# Patient Record
Sex: MALE | Race: WHITE | NOT HISPANIC OR LATINO | Employment: OTHER | URBAN - METROPOLITAN AREA
[De-identification: names, ages, dates, MRNs, and addresses within clinical notes are randomized per-mention and may not be internally consistent; named-entity substitution may affect disease eponyms.]

---

## 2017-02-02 ENCOUNTER — RADIANT APPOINTMENT (OUTPATIENT)
Dept: GENERAL RADIOLOGY | Facility: CLINIC | Age: 62
End: 2017-02-02
Attending: FAMILY MEDICINE
Payer: COMMERCIAL

## 2017-02-02 ENCOUNTER — OFFICE VISIT (OUTPATIENT)
Dept: FAMILY MEDICINE | Facility: CLINIC | Age: 62
End: 2017-02-02
Payer: COMMERCIAL

## 2017-02-02 VITALS
WEIGHT: 230.9 LBS | DIASTOLIC BLOOD PRESSURE: 72 MMHG | BODY MASS INDEX: 32.22 KG/M2 | HEART RATE: 70 BPM | SYSTOLIC BLOOD PRESSURE: 138 MMHG | TEMPERATURE: 97.4 F

## 2017-02-02 DIAGNOSIS — M79.5 FOREIGN BODY (FB) IN SOFT TISSUE: ICD-10-CM

## 2017-02-02 DIAGNOSIS — M79.5 FOREIGN BODY (FB) IN SOFT TISSUE: Primary | ICD-10-CM

## 2017-02-02 PROCEDURE — 99213 OFFICE O/P EST LOW 20 MIN: CPT | Performed by: FAMILY MEDICINE

## 2017-02-02 PROCEDURE — 73140 X-RAY EXAM OF FINGER(S): CPT | Mod: LT

## 2017-02-02 NOTE — MR AVS SNAPSHOT
After Visit Summary   2/2/2017    Doron Feldman    MRN: [id number]           Patient Information     Date Of Birth          1955        Visit Information        Provider Department      2/2/2017 2:00 PM Post, EFREN Oconnor MD [facility]        Today's Diagnoses     Foreign body (FB) in soft tissue    -  1        Follow-ups after your visit        Additional Services     ORTHO  REFERRAL       [facility] is referring you to the Orthopedic  Services at [facility].       The  Representative will assist you in the coordination of your Orthopedic and Musculoskeletal Care as prescribed by your physician.    The  Representative will call you within 1 business day to help schedule your appointment, or you may contact the  Representative at:    All areas ~ [phone]     Type of Referral : Surgical / Specialist       Timeframe requested: I spoke with Dr Mcdonald by phone and she will work him in at her [facility] office Fri, Feb 3    Coverage of these services is subject to the terms and limitations of your health insurance plan.  Please call member services at your health plan with any benefit or coverage questions.      If X-rays, CT or MRI's have been performed, please contact the facility where they were done to arrange for , prior to your scheduled appointment.  Please bring this referral request to your appointment and present it to your specialist.                  Who to contact     If you have questions or need follow up information about today's clinic visit or your schedule please contact [facility] directly at [phone].  Normal or non-critical lab and imaging results will be communicated to you by MyChart, letter or phone within 4 business days after the clinic has received the results. If you do not hear from us within 7 days, please contact the clinic through [email or web address]  "or phone. If you have a critical or abnormal lab result, we will notify you by phone as soon as possible.  Submit refill requests through [employer] or call your pharmacy and they will forward the refill request to us. Please allow 3 business days for your refill to be completed.          Additional Information About Your Visit        [employer] Information     [employer] lets you send messages to your doctor, view your test results, renew your prescriptions, schedule appointments and more. To sign up, go to [email or web address] . Click on \"Log in\" on the left side of the screen, which will take you to the Welcome page. Then click on \"Sign up Now\" on the right side of the page.     You will be asked to enter the access code listed below, as well as some personal information. Please follow the directions to create your username and password.     Your access code is: [email or web address]  Expires: 5/3/2017  3:15 PM     Your access code will  in 90 days. If you need help or a new code, please call your [facility] clinic or [phone].        Care EveryWhere ID     This is your Care EveryWhere ID. This could be used by other organizations to access your [facility] medical records  [id number]        Your Vitals Were     Pulse Temperature                70 97.4  F (36.3  C) (Tympanic)           Blood Pressure from Last 3 Encounters:   17 138/72   05/10/16 132/80   16 138/94    Weight from Last 3 Encounters:   17 230 lb 14.4 oz (104.736 kg)   05/10/16 228 lb (103.42 kg)   08/28/15 236 lb 12.8 oz (107.412 kg)              We Performed the Following     ORTHO  REFERRAL          Today's Medication Changes          These changes are accurate as of: 17  3:15 PM.  If you have any questions, ask your nurse or doctor.               Start taking these medicines.        Dose/Directions    amoxicillin-clavulanate 875-125 MG per tablet   Commonly known as:  AUGMENTIN   Used for:  Foreign body (FB) in soft tissue "   Started by:  EFREN Godoy MD        Dose:  1 tablet   Take 1 tablet by mouth 2 times daily   Quantity:  20 tablet   Refills:  0            Where to get your medicines      These medications [place] [facility] [place] - [address] [facility]  [address] [facility] [address]     Phone:  [phone]    - amoxicillin-clavulanate 875-125 MG per tablet             Primary Care Provider Office Phone # Fax #    Fahad Fisher [phone]       [facility] [address] [facility] [address]        Thank you!     Thank you for choosing [facility]  for your care. Our goal is always to provide you with excellent care. Hearing back from our patients is one way we can continue to improve our services. Please take a few minutes to complete the written survey that you may receive in the mail after your visit with us. Thank you!             Your Updated Medication List - Protect others around you: Learn how to safely use, store and throw away your medicines at www.disposemymeds.org.          This list is accurate as of: 2/2/17  3:15 PM.  Always use your most recent med list.                   Brand Name Dispense Instructions for use    amoxicillin-clavulanate 875-125 MG per tablet    AUGMENTIN    20 tablet    Take 1 tablet by mouth 2 times daily       aspirin 81 MG tablet      Take 81 mg by mouth daily       carvedilol 25 MG tablet    COREG    180 tablet    Take 1 tablet (25 mg) by mouth 2 times daily (with meals)       finasteride 5 MG tablet    PROSCAR    90 tablet    Take 1 tablet (5 mg) by mouth daily       lisinopril 20 MG tablet    PRINIVIL/ZESTRIL    180 tablet    Take 1 tablet (20 mg) by mouth 2 times daily       OXYCODONE HCL PO          pravastatin 40 MG tablet    PRAVACHOL    90 tablet    Take 1 tablet (40 mg) by mouth daily       sildenafil 100 MG cap/tab    VIAGRA    6 tablet    Take 1 tablet (100 mg) by  mouth daily as needed for erectile dysfunction

## 2017-02-02 NOTE — NURSING NOTE
"Chief Complaint   Patient presents with     Infection     was hammering yesterday and something flee out and hit him the the thumb and now it is red and swollen, left hand       Initial /88 mmHg  Pulse 70  Temp(Src) 97.4  F (36.3  C) (Tympanic)  Wt 230 lb 14.4 oz (104.736 kg) Estimated body mass index is 32.22 kg/(m^2) as calculated from the following:    Height as of 5/10/16: 5' 11\" (1.803 m).    Weight as of this encounter: 230 lb 14.4 oz (104.736 kg).  BP completed using cuff size: large  Carmella Card CMA      "

## 2017-02-02 NOTE — PROGRESS NOTES
SUBJECTIVE:                                                    Doron Feldman is a 61 year old male who presents to clinic today for the following health issues:      Chief Complaint   Patient presents with     Infection     was hammering yesterday and something flew out and hit him the the thumb and now it is red and swollen, left hand   He was striking a hammer in his left hand with another hammer in his right hand and a piece of metal flew into the base of his left thumb. It was bleeding and he tried to squeeze the blood out, but was not sure if there was still a foreign body present. Now today it is more tender and somewhat red so he was concerned about a retained foreign body          Problem list and histories reviewed & adjusted, as indicated.  Additional history: none          OBJECTIVE:                                                    /72 mmHg  Pulse 70  Temp(Src) 97.4  F (36.3  C) (Tympanic)  Wt 230 lb 14.4 oz (104.736 kg)    GENERAL: healthy, alert and no distress  EYES: Eyes grossly normal to inspection, extraocular movements - intact, and PERRL  MS: In the region of the proximal left thumb is a small puncture wound and surrounding this is erythema; there is tenderness to palpation over the proximal phalanx of the thumb on the dorsal radial aspect    X-ray of the thumb shows a metallic foreign body in the region of the tenderness to palpation      ASSESSMENT/PLAN:                                                    ASSESSMENT:  1. Foreign body (FB) in soft tissue        PLAN:  Orders Placed This Encounter     XR Finger Left G/E 2 Views     He was referred to Dr. Mcdonald, hand surgeon with [facility]. I spoke with her on the phone and she recommended infusing some saline around the wound, then placing him in a thumb spica splint and starting him on Augmentin. This was done; she will see him at her [facility] clinic tomorrow.    EFREN Oconnor Post  [facility]

## 2017-02-03 ENCOUNTER — OFFICE VISIT (OUTPATIENT)
Dept: FAMILY MEDICINE | Facility: CLINIC | Age: 62
End: 2017-02-03
Payer: COMMERCIAL

## 2017-02-03 ENCOUNTER — TRANSFERRED RECORDS (OUTPATIENT)
Dept: HEALTH INFORMATION MANAGEMENT | Facility: CLINIC | Age: 62
End: 2017-02-03

## 2017-02-03 VITALS
WEIGHT: 230 LBS | SYSTOLIC BLOOD PRESSURE: 106 MMHG | BODY MASS INDEX: 32.2 KG/M2 | DIASTOLIC BLOOD PRESSURE: 70 MMHG | HEIGHT: 71 IN | HEART RATE: 69 BPM | TEMPERATURE: 98.2 F

## 2017-02-03 DIAGNOSIS — Z23 ENCOUNTER FOR IMMUNIZATION: ICD-10-CM

## 2017-02-03 DIAGNOSIS — Z01.818 PREOP GENERAL PHYSICAL EXAM: Primary | ICD-10-CM

## 2017-02-03 LAB
BASOPHILS # BLD AUTO: 0.1 10E9/L (ref 0–0.2)
BASOPHILS NFR BLD AUTO: 0.8 %
CREAT SERPL-MCNC: 1.22 MG/DL (ref 0.66–1.25)
DIFFERENTIAL METHOD BLD: NORMAL
EOSINOPHIL # BLD AUTO: 0.2 10E9/L (ref 0–0.7)
EOSINOPHIL NFR BLD AUTO: 3.6 %
ERYTHROCYTE [DISTWIDTH] IN BLOOD BY AUTOMATED COUNT: 12.3 % (ref 10–15)
GFR SERPL CREATININE-BSD FRML MDRD: 60 ML/MIN/1.7M2
HCT VFR BLD AUTO: 45.5 % (ref 40–53)
HGB BLD-MCNC: 15.2 G/DL (ref 13.3–17.7)
LYMPHOCYTES # BLD AUTO: 2.1 10E9/L (ref 0.8–5.3)
LYMPHOCYTES NFR BLD AUTO: 32.2 %
MCH RBC QN AUTO: 31.7 PG (ref 26.5–33)
MCHC RBC AUTO-ENTMCNC: 33.4 G/DL (ref 31.5–36.5)
MCV RBC AUTO: 95 FL (ref 78–100)
MONOCYTES # BLD AUTO: 0.8 10E9/L (ref 0–1.3)
MONOCYTES NFR BLD AUTO: 12.7 %
NEUTROPHILS # BLD AUTO: 3.2 10E9/L (ref 1.6–8.3)
NEUTROPHILS NFR BLD AUTO: 50.7 %
PLATELET # BLD AUTO: 195 10E9/L (ref 150–450)
POTASSIUM SERPL-SCNC: 4.3 MMOL/L (ref 3.4–5.3)
RBC # BLD AUTO: 4.79 10E12/L (ref 4.4–5.9)
WBC # BLD AUTO: 6.4 10E9/L (ref 4–11)

## 2017-02-03 PROCEDURE — 36415 COLL VENOUS BLD VENIPUNCTURE: CPT | Performed by: FAMILY MEDICINE

## 2017-02-03 PROCEDURE — 85025 COMPLETE CBC W/AUTO DIFF WBC: CPT | Performed by: FAMILY MEDICINE

## 2017-02-03 PROCEDURE — 82565 ASSAY OF CREATININE: CPT | Performed by: FAMILY MEDICINE

## 2017-02-03 PROCEDURE — 84132 ASSAY OF SERUM POTASSIUM: CPT | Performed by: FAMILY MEDICINE

## 2017-02-03 PROCEDURE — 99214 OFFICE O/P EST MOD 30 MIN: CPT | Performed by: FAMILY MEDICINE

## 2017-02-03 PROCEDURE — 90471 IMMUNIZATION ADMIN: CPT | Performed by: FAMILY MEDICINE

## 2017-02-03 PROCEDURE — 90715 TDAP VACCINE 7 YRS/> IM: CPT | Performed by: FAMILY MEDICINE

## 2017-02-03 NOTE — MR AVS SNAPSHOT
After Visit Summary   2/3/2017    Doron Feldman    MRN: [id number]           Patient Information     Date Of Birth          1955        Visit Information        Provider Department      2/3/2017 1:40 PM Quentin Tanner MD [facility]        Today's Diagnoses     Preop general physical exam    -  1       Care Instructions      Before Your Surgery      Call your surgeon if there is any change in your health. This includes signs of a cold or flu (such as a sore throat, runny nose, cough, rash or fever).    Do not smoke, drink alcohol or take over the counter medicine (unless your surgeon or primary care doctor tells you to) for the 24 hours before and after surgery.    If you take prescribed drugs: Follow your doctor s orders about which medicines to take and which to stop until after surgery.    Eating and drinking prior to surgery: follow the instructions from your surgeon    Take a shower or bath the night before surgery. Use the soap your surgeon gave you to gently clean your skin. If you do not have soap from your surgeon, use your regular soap. Do not shave or scrub the surgery site.  Wear clean pajamas and have clean sheets on your bed.             Thank you for choosing [facility].  You may be receiving a survey in the mail from "[employer] [facility] regarding your visit today.  Please take a few minutes to complete and return the survey to let us know how we are doing.      If you have questions or concerns, please contact us via [employer] or you can contact your care team at [phone].    Our Clinic hours are:  Monday 6:40 am  to 7:00 pm  Tuesday -Friday 6:40 am to 5:00 pm    The [place] outpatient lab hours are:  Monday - Friday 6:10 am to 4:45 pm  Saturdays 7:00 am to 11:00 am  Appointments are required, call [phone]    If you have clinical questions after hours or would like to schedule an appointment,  call the clinic at [phone].    DIAGNOSTICS:                                                       EKG: Not indicated due to non-vascular surgery and low risk of event (age <65 and without cardiac risk factors)  Labs Drawn and in Process:   Unresulted Labs Ordered in the Past 30 Days of this Admission     No orders found from 2016 to 2017.          Recent Labs   Lab Test  09/01/15   1553  03/27/15   0817 14   1537   14   0719   11   1730   HGB   --    --    --    --    --    --   15.7   --   14.3   PLT   --    --    --    --    --    --   196   --   262   INR   --   2.02*  2.3*   < >   --    < >  1.82*   < >  1.40*   NA  139   --    --    --   144   --   143   --   139   POTASSIUM  4.4   --    --    --   4.1   --   4.0   --   4.0   CR  0.89   --    --    --   1.08   --   0.76   --   0.75    < > = values in this interval not displayed.        IMPRESSION:                                                    Reason for surgery/procedure: Doron Feldman (: 1955) presents for pre-operative evaluation assessment as requested by Dr. Mcdonald. He requires evaluation and anesthesia risk assessment prior to undergoing surgery/procedure for treatment of a metal foreign body in soft tissue.  Proposed procedure: Left thumb foreign body removal.    The proposed surgical procedure is considered LOW risk.    REVISED CARDIAC RISK INDEX  The patient has the following serious cardiovascular risks for perioperative complications such as (MI, PE, VFib and 3  AV Block):  No serious cardiac risks  INTERPRETATION: 0 risks: Class I (very low risk - 0.4% complication rate)    The patient has the following additional risks for perioperative complications:  No identified additional risks      ICD-10-CM    1. Preop general physical exam Z01.818        RECOMMENDATIONS:                                                      --Patient is to take all scheduled medications on the day before surgery EXCEPT for modifications listed below.  Stop aspirin now. Take no  "advil or aleve before surgery. Tylenol is Ok.   Your stomach should be empty for 8 hours before surgery.         Follow-ups after your visit        Your next 10 appointments already scheduled     2017   Procedure with Adilene Mcdonald MD   [facility] (--)    [address] [facility] [address]   [phone]           The medical center is located at [address]. (between [address] and [address] in [place], [place] north of [place]).              Who to contact     If you have questions or need follow up information about today's clinic visit or your schedule please contact [facility] directly at [phone].  Normal or non-critical lab and imaging results will be communicated to you by [employer], letter or phone within 4 business days after the clinic has received the results. If you do not hear from us within 7 days, please contact the clinic through [employer] or phone. If you have a critical or abnormal lab result, we will notify you by phone as soon as possible.  Submit refill requests through [employer] or call your pharmacy and they will forward the refill request to us. Please allow 3 business days for your refill to be completed.          Additional Information About Your Visit        [employer] Information     [employer] lets you send messages to your doctor, view your test results, renew your prescriptions, schedule appointments and more. To sign up, go to [email or web address] . Click on \"Log in\" on the left side of the screen, which will take you to the Welcome page. Then click on \"Sign up Now\" on the right side of the page.     You will be asked to enter the access code listed below, as well as some personal information. Please follow the directions to create your username and password.     Your access code is: [email or web address]  Expires: 5/3/2017  3:15 PM     Your access code will  in 90 days. If you need help or a new code, please call your [facility] " "clinic or [phone].        Care EveryWhere ID     This is your Care EveryWhere ID. This could be used by other organizations to access your [place] medical records  [id number]        Your Vitals Were     Pulse Temperature Height BMI (Body Mass Index)          69 98.2  F (36.8  C) (Tympanic) 5' 11\" (1.803 m) 32.09 kg/m2         Blood Pressure from Last 3 Encounters:   02/03/17 106/70   02/02/17 138/72   05/10/16 132/80    Weight from Last 3 Encounters:   02/03/17 230 lb (104.327 kg)   02/02/17 230 lb 14.4 oz (104.736 kg)   05/10/16 228 lb (103.42 kg)              We Performed the Following     CBC with platelets differential     Creatinine     Potassium        Primary Care Provider Office Phone # Fax #    Fahad Fisher [phone]       [facility] [address] [facility] [address]        Thank you!     Thank you for choosing [facility]  for your care. Our goal is always to provide you with excellent care. Hearing back from our patients is one way we can continue to improve our services. Please take a few minutes to complete the written survey that you may receive in the mail after your visit with us. Thank you!             Your Updated Medication List - Protect others around you: Learn how to safely use, store and throw away your medicines at www.disposemymeds.org.          This list is accurate as of: 2/3/17  2:18 PM.  Always use your most recent med list.                   Brand Name Dispense Instructions for use    amoxicillin-clavulanate 875-125 MG per tablet    AUGMENTIN    20 tablet    Take 1 tablet by mouth 2 times daily       aspirin 81 MG tablet      Take 81 mg by mouth daily       carvedilol 25 MG tablet    COREG    180 tablet    Take 1 tablet (25 mg) by mouth 2 times daily (with meals)       finasteride 5 MG tablet    PROSCAR    90 tablet    Take 1 tablet (5 mg) by mouth daily       lisinopril 20 MG tablet    PRINIVIL/ZESTRIL    180 tablet    Take " 1 tablet (20 mg) by mouth 2 times daily       NEW MED      CO-Q 10 taking daily.       pravastatin 40 MG tablet    PRAVACHOL    90 tablet    Take 1 tablet (40 mg) by mouth daily       sildenafil 100 MG cap/tab    VIAGRA    6 tablet    Take 1 tablet (100 mg) by mouth daily as needed for erectile dysfunction

## 2017-02-03 NOTE — NURSING NOTE
"Chief Complaint   Patient presents with     Pre-Op Exam     Pre-op physical.       Initial /70 mmHg  Pulse 69  Temp(Src) 98.2  F (36.8  C) (Tympanic)  Ht 5' 11\" (1.803 m)  Wt 230 lb (104.327 kg)  BMI 32.09 kg/m2 Estimated body mass index is 32.09 kg/(m^2) as calculated from the following:    Height as of this encounter: 5' 11\" (1.803 m).    Weight as of this encounter: 230 lb (104.327 kg).  BP completed using cuff size: large    Screening Questionnaire for Adult Immunization    Are you sick today?   No   Do you have allergies to medications, food, a vaccine component or latex?   No   Have you ever had a serious reaction after receiving a vaccination?   No   Do you have a long-term health problem with heart disease, lung disease, asthma, kidney disease, metabolic disease (e.g. diabetes), anemia, or other blood disorder?   No   Do you have cancer, leukemia, HIV/AIDS, or any other immune system problem?   No   In the past 3 months, have you taken medications that affect  your immune system, such as prednisone, other steroids, or anticancer drugs; drugs for the treatment of rheumatoid arthritis, Crohn s disease, or psoriasis; or have you had radiation treatments?   No   Have you had a seizure, or a brain or other nervous system problem?   No   During the past year, have you received a transfusion of blood or blood     products, or been given immune (gamma) globulin or antiviral drug?   No   For women: Are you pregnant or is there a chance you could become        pregnant during the next month?   No   Have you received any vaccinations in the past 4 weeks?   No     Immunization questionnaire answers were all negative.      MNVFC doesn't apply on this patient    Per orders of Dr. Tanner, injection of Adacel given by Diana Mazariegos. Patient instructed to remain in clinic for 20 minutes afterwards, and to report any adverse reaction to me immediately.       Screening performed by Diana Mazariegos on " 2/3/2017 at 3:03 PM.

## 2017-02-03 NOTE — PATIENT INSTRUCTIONS
Before Your Surgery      Call your surgeon if there is any change in your health. This includes signs of a cold or flu (such as a sore throat, runny nose, cough, rash or fever).    Do not smoke, drink alcohol or take over the counter medicine (unless your surgeon or primary care doctor tells you to) for the 24 hours before and after surgery.    If you take prescribed drugs: Follow your doctor s orders about which medicines to take and which to stop until after surgery.    Eating and drinking prior to surgery: follow the instructions from your surgeon    Take a shower or bath the night before surgery. Use the soap your surgeon gave you to gently clean your skin. If you do not have soap from your surgeon, use your regular soap. Do not shave or scrub the surgery site.  Wear clean pajamas and have clean sheets on your bed.             Thank you for choosing [facility]  You may be receiving a survey in the mail from [facility] regarding your visit today.  Please take a few minutes to complete and return the survey to let us know how we are doing.      If you have questions or concerns, please contact us via [employer] or you can contact your care team at [phone].    Our Clinic hours are:  Monday 6:40 am  to 7:00 pm  Tuesday -Friday 6:40 am to 5:00 pm    The [place] outpatient lab hours are:  Monday - Friday 6:10 am to 4:45 pm  Saturdays 7:00 am to 11:00 am  Appointments are required, call [phone]    If you have clinical questions after hours or would like to schedule an appointment,  call the clinic at [phone].    DIAGNOSTICS:                                                      EKG: Not indicated due to non-vascular surgery and low risk of event (age <65 and without cardiac risk factors)  Labs Drawn and in Process:   Unresulted Labs Ordered in the Past 30 Days of this Admission     No orders found from 12/6/2016 to 2/4/2017.          Recent Labs   Lab Test  09/01/15   1553  03/27/15   0817 11/26/14    14   1537   14   0719   11   1730   HGB   --    --    --    --    --    --   15.7   --   14.3   PLT   --    --    --    --    --    --   196   --   262   INR   --   2.02*  2.3*   < >   --    < >  1.82*   < >  1.40*   NA  139   --    --    --   144   --   143   --   139   POTASSIUM  4.4   --    --    --   4.1   --   4.0   --   4.0   CR  0.89   --    --    --   1.08   --   0.76   --   0.75    < > = values in this interval not displayed.        IMPRESSION:                                                    Reason for surgery/procedure: Doron Feldman (: 1955) presents for pre-operative evaluation assessment as requested by Dr. Mcdonald. He requires evaluation and anesthesia risk assessment prior to undergoing surgery/procedure for treatment of a metal foreign body in soft tissue.  Proposed procedure: Left thumb foreign body removal.    The proposed surgical procedure is considered LOW risk.    REVISED CARDIAC RISK INDEX  The patient has the following serious cardiovascular risks for perioperative complications such as (MI, PE, VFib and 3  AV Block):  No serious cardiac risks  INTERPRETATION: 0 risks: Class I (very low risk - 0.4% complication rate)    The patient has the following additional risks for perioperative complications:  No identified additional risks      ICD-10-CM    1. Preop general physical exam Z01.818        RECOMMENDATIONS:                                                      --Patient is to take all scheduled medications on the day before surgery EXCEPT for modifications listed below.  Stop aspirin now. Take no advil or aleve before surgery. Tylenol is Ok.   Your stomach should be empty for 8 hours before surgery.

## 2017-02-03 NOTE — PROGRESS NOTES
[facility]  [address] [facility] [address]  [phone]  Dept: [phone]    PRE-OP EVALUATION:  Today's date: 2/3/2017    Doron Feldman (: 1955) presents for pre-operative evaluation assessment as requested by Dr. Mcdonald. He requires evaluation and anesthesia risk assessment prior to undergoing surgery/procedure for treatment of a metal foreign body in soft tissue.  Proposed procedure: Left thumb foreign body removal.    Date of Surgery/ Procedure: 2017  Time of Surgery/ Procedure: Will call as the date gets closer.  Hospital/Surgical Facility: [facility]  Fax number for surgical facility: No fax needed.  Primary Physician: Fahad Fisher/  Dr. Quentin Tanner did the pre-op physical.  Type of Anesthesia Anticipated: Monitor anesthesia care.    Patient has a Health Care Directive or Living Will:  YES Living Will.    1. YES - Do you have a history of heart attack, stroke, stent, bypass or surgery on an artery in the head, neck, heart or legs?  2. NO - Do you ever have any pain or discomfort in your chest?  3. NO - Do you have a history of  Heart Failure?  4. NO - Are you troubled by shortness of breath when: walking on the level, up a slight hill or at night?  5. YES - Do you currently have a cold, bronchitis or other respiratory infection?  Just some phlegm in the throat for a few weeks.  No fever or chills.  6. NO - Do you have a cough, shortness of breath or wheezing?  7. NO - Do you sometimes get pains in the calves of your legs when you walk?  8. NO - Do you or anyone in your family have previous history of blood clots?  9. NO - Do you or does anyone in your family have a serious bleeding problem such as prolonged bleeding following surgeries or cuts?  10. NO - Have you ever had problems with anemia or been told to take iron pills?  11. NO - Have you had any abnormal blood loss such as black, tarry or bloody stools, or abnormal vaginal bleeding?  12. NO - Have you  ever had a blood transfusion?  13. NO - Have you or any of your relatives ever had problems with anesthesia?  14. YES - Do you have sleep apnea, excessive snoring or daytime drowsiness?  Sleep apnea.  15. NO - Do you have any prosthetic heart valves?  16. YES - Do you have prosthetic joints?  Right knee.    HPI:                                                      Brief HPI related to upcoming procedure: see above      See problem list for active medical problems.  Problems all longstanding and stable, except as noted/documented.  See ROS for pertinent symptoms related to these conditions.                                                                                                  .    MEDICAL HISTORY:                                                      Patient Active Problem List    Diagnosis Date Noted     Hyperlipidemia      Priority: Medium     TYLER (obstructive sleep apnea)      Priority: Medium     Hypertension      Priority: Medium     Cardiomyopathy (H)      Priority: Medium     Atrial fibrillation (H)      Priority: Medium     HTN (hypertension) 04/25/2011     Priority: Medium     Health Care Home 04/19/2011     Priority: Medium     High priority patient - 4/19/11      DX V65.8 REPLACED WITH 37297 HEALTH CARE HOME (04/08/2013)       CARDIOVASCULAR SCREENING; LDL GOAL LESS THAN 130 10/31/2010     Priority: Medium     Elevated PSA 10/07/2010     Priority: Medium     Other specified cardiac dysrhythmias(427.89) 04/03/2008     Priority: Medium      Past Medical History   Diagnosis Date     Cardiomyopathy, idiopathic (H)      Hypertension      Coronary artery disease      Stent     HTN (hypertension) 4/25/2011     CARDIAC DYSRHYTHMIAS NEC 4/3/2008     Elevated PSA 10/7/2010     Atrial fibrillation (H) 9/23/10     Shortness of breath      Hyperlipidemia      TYLER (obstructive sleep apnea)      CPAP     Past Surgical History   Procedure Laterality Date     Surgical history of -        right knee arthoscopic      "Surgical history of -        left 5th finger surgery     Orthopedic surgery       knee- both arthrocsopic     Orthopedic surgery       left pinky finger     H ablation focal afib  3/27/14     Cardiac catherization  2011     mid RCA stenosis of 85-90%-BMS     Current Outpatient Prescriptions   Medication Sig Dispense Refill     NEW MED CO-Q 10 taking daily.       amoxicillin-clavulanate (AUGMENTIN) 875-125 MG per tablet Take 1 tablet by mouth 2 times daily 20 tablet 0     pravastatin (PRAVACHOL) 40 MG tablet Take 1 tablet (40 mg) by mouth daily 90 tablet 3     lisinopril (PRINIVIL/ZESTRIL) 20 MG tablet Take 1 tablet (20 mg) by mouth 2 times daily 180 tablet 3     finasteride (PROSCAR) 5 MG tablet Take 1 tablet (5 mg) by mouth daily 90 tablet 3     carvedilol (COREG) 25 MG tablet Take 1 tablet (25 mg) by mouth 2 times daily (with meals) 180 tablet 3     sildenafil (VIAGRA) 100 MG tablet Take 1 tablet (100 mg) by mouth daily as needed for erectile dysfunction 6 tablet 10     aspirin 81 MG tablet Take 81 mg by mouth daily       OTC products: None, except as noted above    No Known Allergies   Latex Allergy: NO    Social History   Substance Use Topics     Smoking status: Never Smoker      Smokeless tobacco: Never Used     Alcohol Use: Yes      Comment: 2-3 drinks per week     History   Drug Use No       REVIEW OF SYSTEMS:                                                    C: NEGATIVE for fever, chills, change in weight  E/M: NEGATIVE for ear, mouth and throat problems  R: NEGATIVE for significant cough or SOB  CV: NEGATIVE for chest pain, palpitations or peripheral edema    EXAM:                                                    /70 mmHg  Pulse 69  Temp(Src) 98.2  F (36.8  C) (Tympanic)  Ht 5' 11\" (1.803 m)  Wt 230 lb (104.327 kg)  BMI 32.09 kg/m2  Exam:  GENERAL APPEARANCE: healthy, alert and no distress  EYES: EOMI,  PERRL  HENT: ear canals and TM's normal and nose and mouth without ulcers or lesions  NECK: " no adenopathy, no asymmetry, masses, or scars and thyroid normal to palpation  RESP: lungs clear to auscultation - no rales, rhonchi or wheezes  CV: regular rates and rhythm, normal S1 S2, no S3 or S4 and no murmur, click or rub -  ABDOMEN:  soft, nontender, no HSM or masses and bowel sounds normal  MS: he is wearing a splint on the left hand and wrist. There is a puncture wound on the left thumb area.   SKIN: no suspicious lesions or rashes  NEURO: Normal strength and tone, sensory exam grossly normal, mentation intact and speech normal;   The strength and sensation are normal on the hands.   PSYCH: mentation appears normal and affect normal/bright  LYMPHATICS: No axillary, cervical, inguinal, or supraclavicular nodes      DIAGNOSTICS:                                                      EKG: Not indicated due to non-vascular surgery and low risk of event (age <65 and without cardiac risk factors)  Labs Drawn and in Process:   Unresulted Labs Ordered in the Past 30 Days of this Admission     No orders found from 2016 to 2017.          Recent Labs   Lab Test  09/01/15   1553  03/27/15   0817 14   1537   14   0719   11   1730   HGB   --    --    --    --    --    --   15.7   --   14.3   PLT   --    --    --    --    --    --   196   --   262   INR   --   2.02*  2.3*   < >   --    < >  1.82*   < >  1.40*   NA  139   --    --    --   144   --   143   --   139   POTASSIUM  4.4   --    --    --   4.1   --   4.0   --   4.0   CR  0.89   --    --    --   1.08   --   0.76   --   0.75    < > = values in this interval not displayed.        IMPRESSION:                                                    Reason for surgery/procedure: Doron Feldman (: 1955) presents for pre-operative evaluation assessment as requested by Dr. Mcdonald. He requires evaluation and anesthesia risk assessment prior to undergoing surgery/procedure for treatment of a metal foreign body in soft tissue.   Proposed procedure: Left thumb foreign body removal.    The proposed surgical procedure is considered LOW risk.    REVISED CARDIAC RISK INDEX  The patient has the following serious cardiovascular risks for perioperative complications such as (MI, PE, VFib and 3  AV Block):  No serious cardiac risks  INTERPRETATION: 0 risks: Class I (very low risk - 0.4% complication rate)    The patient has the following additional risks for perioperative complications:  No identified additional risks      ICD-10-CM    1. Preop general physical exam Z01.818        RECOMMENDATIONS:                                                      --Patient is to take all scheduled medications on the day before surgery EXCEPT for modifications listed below.  Stop aspirin now. Take no advil or aleve before surgery. Tylenol is Ok.   Your stomach should be empty for 8 hours before surgery.     APPROVAL GIVEN to proceed with proposed procedure, without further diagnostic evaluation       Signed Electronically by: Quentin Tanner MD    Copy of this evaluation report is provided to requesting physician.    [place] Preop Guidelines

## 2017-02-03 NOTE — Clinical Note
[facility]  [address] [facility] [address] [place] [address]  Phone: [phone]    February 3, 2017    Doron Feldman  [address] [place] [address]          Dear Mr. Feldman,    The results of your recent lab tests were within normal limits.   Component      Latest Ref Rng 2/3/2017   WBC      4.0 - 11.0 10e9/L 6.4   RBC Count      4.4 - 5.9 10e12/L 4.79   Hemoglobin      13.3 - 17.7 g/dL 15.2   Hematocrit      40.0 - 53.0 % 45.5   MCV      78 - 100 fl 95   MCH      26.5 - 33.0 pg 31.7   MCHC      31.5 - 36.5 g/dL 33.4   RDW      10.0 - 15.0 % 12.3   Platelet Count      150 - 450 10e9/L 195   Diff Method       Automated Method   % Neutrophils       50.7   % Lymphocytes       32.2   % Monocytes       12.7   % Eosinophils       3.6   % Basophils       0.8   Absolute Neutrophil      1.6 - 8.3 10e9/L 3.2   Absolute Lymphocytes      0.8 - 5.3 10e9/L 2.1   Absolute Monocytes      0.0 - 1.3 10e9/L 0.8   Absolute Eosinophils      0.0 - 0.7 10e9/L 0.2   Absolute Basophils      0.0 - 0.2 10e9/L 0.1   Creatinine      0.66 - 1.25 mg/dL 1.22   GFR Estimate      >60 mL/min/1.7m2 60 (L)   GFR Estimate If Black      >60 mL/min/1.7m2 73   Potassium      3.4 - 5.3 mmol/L 4.3     If you have any further questions or problems, please contact our office.    Sincerely,      Quentin Tanner MD / joslyn

## 2017-02-03 NOTE — NURSING NOTE
"Chief Complaint   Patient presents with     Pre-Op Exam     Pre-op physical.       Initial /70 mmHg  Pulse 69  Temp(Src) 98.2  F (36.8  C) (Tympanic)  Ht 5' 11\" (1.803 m)  Wt 230 lb (104.327 kg)  BMI 32.09 kg/m2 Estimated body mass index is 32.09 kg/(m^2) as calculated from the following:    Height as of this encounter: 5' 11\" (1.803 m).    Weight as of this encounter: 230 lb (104.327 kg).  BP completed using cuff size: large  "

## 2017-02-07 ENCOUNTER — APPOINTMENT (OUTPATIENT)
Dept: GENERAL RADIOLOGY | Facility: CLINIC | Age: 62
End: 2017-02-07
Attending: ORTHOPAEDIC SURGERY
Payer: COMMERCIAL

## 2017-02-07 ENCOUNTER — HOSPITAL ENCOUNTER (OUTPATIENT)
Facility: CLINIC | Age: 62
Discharge: HOME | End: 2017-02-07
Attending: ORTHOPAEDIC SURGERY | Admitting: ORTHOPAEDIC SURGERY
Payer: COMMERCIAL

## 2017-02-07 VITALS
TEMPERATURE: 98.5 F | SYSTOLIC BLOOD PRESSURE: 151 MMHG | DIASTOLIC BLOOD PRESSURE: 92 MMHG | OXYGEN SATURATION: 96 % | RESPIRATION RATE: 16 BRPM | BODY MASS INDEX: 31.5 KG/M2 | HEART RATE: 65 BPM | HEIGHT: 71 IN | WEIGHT: 225 LBS

## 2017-02-07 PROCEDURE — 36000058 ZZH SURGERY LEVEL 3 EA 15 ADDTL MIN: Performed by: ORTHOPAEDIC SURGERY

## 2017-02-07 PROCEDURE — 36000060 ZZH SURGERY LEVEL 3 W FLUORO 1ST 30 MIN: Performed by: ORTHOPAEDIC SURGERY

## 2017-02-07 PROCEDURE — 40000305 ZZH STATISTIC PRE PROC ASSESS I: Performed by: ORTHOPAEDIC SURGERY

## 2017-02-07 PROCEDURE — 27210794 ZZH OR GENERAL SUPPLY STERILE: Performed by: ORTHOPAEDIC SURGERY

## 2017-02-07 PROCEDURE — 25000125 ZZHC RX 250: Performed by: ORTHOPAEDIC SURGERY

## 2017-02-07 PROCEDURE — 40000277 XR SURGERY CARM FLUORO LESS THAN 5 MIN W STILLS: Mod: TC

## 2017-02-07 PROCEDURE — 25000125 ZZHC RX 250: Performed by: NURSE ANESTHETIST, CERTIFIED REGISTERED

## 2017-02-07 PROCEDURE — 71000027 ZZH RECOVERY PHASE 2 EACH 15 MINS: Performed by: ORTHOPAEDIC SURGERY

## 2017-02-07 PROCEDURE — 25800025 ZZH RX 258: Performed by: NURSE ANESTHETIST, CERTIFIED REGISTERED

## 2017-02-07 RX ORDER — LIDOCAINE HYDROCHLORIDE 10 MG/ML
INJECTION, SOLUTION INFILTRATION; PERINEURAL PRN
Status: DISCONTINUED | OUTPATIENT
Start: 2017-02-07 | End: 2017-02-07 | Stop reason: HOSPADM

## 2017-02-07 RX ORDER — CEFAZOLIN SODIUM 2 G/100ML
2 INJECTION, SOLUTION INTRAVENOUS
Status: DISCONTINUED | OUTPATIENT
Start: 2017-02-07 | End: 2017-02-07 | Stop reason: HOSPADM

## 2017-02-07 RX ORDER — LIDOCAINE 40 MG/G
CREAM TOPICAL
Status: DISCONTINUED | OUTPATIENT
Start: 2017-02-07 | End: 2017-02-07 | Stop reason: HOSPADM

## 2017-02-07 RX ORDER — SODIUM CHLORIDE, SODIUM LACTATE, POTASSIUM CHLORIDE, CALCIUM CHLORIDE 600; 310; 30; 20 MG/100ML; MG/100ML; MG/100ML; MG/100ML
INJECTION, SOLUTION INTRAVENOUS CONTINUOUS
Status: DISCONTINUED | OUTPATIENT
Start: 2017-02-07 | End: 2017-02-07 | Stop reason: HOSPADM

## 2017-02-07 RX ORDER — BUPIVACAINE HYDROCHLORIDE 5 MG/ML
INJECTION, SOLUTION PERINEURAL PRN
Status: DISCONTINUED | OUTPATIENT
Start: 2017-02-07 | End: 2017-02-07 | Stop reason: HOSPADM

## 2017-02-07 RX ORDER — BACITRACIN ZINC 500 [USP'U]/G
OINTMENT TOPICAL PRN
Status: DISCONTINUED | OUTPATIENT
Start: 2017-02-07 | End: 2017-02-07 | Stop reason: HOSPADM

## 2017-02-07 RX ORDER — CEFAZOLIN SODIUM 1 G/3ML
1 INJECTION, POWDER, FOR SOLUTION INTRAMUSCULAR; INTRAVENOUS SEE ADMIN INSTRUCTIONS
Status: DISCONTINUED | OUTPATIENT
Start: 2017-02-07 | End: 2017-02-07 | Stop reason: HOSPADM

## 2017-02-07 RX ADMIN — LIDOCAINE HYDROCHLORIDE 1 ML: 10 INJECTION, SOLUTION INFILTRATION; PERINEURAL at 13:55

## 2017-02-07 RX ADMIN — SODIUM CHLORIDE, POTASSIUM CHLORIDE, SODIUM LACTATE AND CALCIUM CHLORIDE: 600; 310; 30; 20 INJECTION, SOLUTION INTRAVENOUS at 13:56

## 2017-02-07 NOTE — DISCHARGE INSTRUCTIONS
Same Day Surgery Discharge Instructions  Special Precautions After Surgery - Adult    1. It is not unusual to feel lightheaded while taking pain medication.  If you have these symptoms; sit for a few minutes before standing and have someone assist you when getting up.  2. You should rest and relax for the next 24 hours and must have someone stay with you for at least 24 hours after your discharge.  3.  DO NOT DRIVE while taking narcotic pain medications that have been prescribed by your physician.  If you had a limb operated on, you must be able to use it fully to drive.  4. DO NOT drink alcoholic beverages  while taking prescription pain medication.  5. Drink clear liquids (apple juice, ginger ale, broth, 7-Up, etc.).  Progress to your regular diet as you feel able.  6. Any questions call your physician and do not make important decisions for 24 hours.                                                                                 [facility]:  [phone]       Same Day Surgery [phone], Monday thru Friday 6am-9pm.

## 2017-02-07 NOTE — IP AVS SNAPSHOT
[facility] PreOP/Phase II    [address] [address]    Phone:  [phone]    Fax:  [phone]                                       After Visit Summary   2/7/2017    Doron Feldman    MRN: [id number]           After Visit Summary Signature Page     I have received my discharge instructions, and my questions have been answered. I have discussed any challenges I see with this plan with the nurse or doctor.    ..........................................................................................................................................  Patient/Patient Representative Signature      ..........................................................................................................................................  Patient Representative Print Name and Relationship to Patient    ..................................................               ................................................  Date                                            Time    ..........................................................................................................................................  Reviewed by Signature/Title    ...................................................              ..............................................  Date                                                            Time

## 2017-02-07 NOTE — IP AVS SNAPSHOT
MRN:[id number]                      After Visit Summary   2/7/2017    Doron Feldman    MRN: [id number]           Thank you!     Thank you for choosing [facility] for your care. Our goal is always to provide you with excellent care. Hearing back from our patients is one way we can continue to improve our services. Please take a few minutes to complete the written survey that you may receive in the mail after you visit with us. Thank you!        Patient Information     Date Of Birth          1955        About your hospital stay     You were admitted on:  February 7, 2017 You last received care in the:  [facility] PreOP/Phase II    You were discharged on:  February 7, 2017        Reason for your hospital stay       Retained foreign body                  Who to Call     For medical emergencies, please call 911.  For non-urgent questions about your medical care, please call your primary care provider or clinic, [phone]  For questions related to your surgery, please call your surgery clinic        Attending Provider     Provider    Adilene Mcdonald MD       Primary Care Provider Office Phone # Fax #    Fahad Fisher [phone]       [facility] [address] [facility] [address]         When to contact your care team       Call your primary doctor if you have any of the following: chest pain, troubles breathing, increased shortness of breath.  Call [facility] ([phone]) if you have any of the following: temperature greater than 100.5F, pain not controlled with elevation or pain medications, drainage that saturates the bandage.                  After Care Instructions     Activity       Keep your arm elevated above your heart for the next 2-3 days.  This will limit swelling and help with pain control.  It is ok to apply an ice bag to the area, but make sure there is no leak or chance for condensation to cause your dressing to get  damp.  Wet dressings can lead to infection.  Keep your sterile surgical dressing clean and dry.      On Sat. Morning, you may remove your dressing.  The incision can get wet under running water only (shower).  Do NOT submerge your wound in standing water (ie dishwater, bath tubs, swimming pools, hot tubs).  Pat the incision dry and cover with clean gauze and re-wrap with an ACE bandage.    On 2/20/17 you may remove your stitches if the wound looks to be healed.  Apply bandaids after the stitches are out.  The wound should ALWAYS be covered until you come back to see Dr. Mcdonald in office.  Please do no remove.  Sponge bathing is recommended.  Otherwise, cover your arm with plastic bags secured around the upper arm if showering and hold your arm outside of the shower.  OK to move your fingers, wrist, and elbow.  No lifting, pushing, pulling more than 10 lbs with the surgical extremity.  No repetitive activities with the surgical hand/wrist.            Diet       Resume your pre-op diet                  Follow-up Appointments     Follow-up and recommended labs and tests        Follow up with Dr. Mcdonald in 2 weeks at [facility] ([phone]).  You may need to call to schedule this appointment if you do not already have a follow-up appointment scheduled.                  Further instructions from your care team                         Same Day Surgery Discharge Instructions  Special Precautions After Surgery - Adult    1. It is not unusual to feel lightheaded while taking pain medication.  If you have these symptoms; sit for a few minutes before standing and have someone assist you when getting up.  2. You should rest and relax for the next 24 hours and must have someone stay with you for at least 24 hours after your discharge.  3.  DO NOT DRIVE while taking narcotic pain medications that have been prescribed by your physician.  If you had a limb operated on, you must be able to use it fully to  "drive.  4. DO NOT drink alcoholic beverages  while taking prescription pain medication.  5. Drink clear liquids (apple juice, ginger ale, broth, 7-Up, etc.).  Progress to your regular diet as you feel able.  6. Any questions call your physician and do not make important decisions for 24 hours.                                                                                 [facility]:  [phone]       Same Day Surgery [phone], Monday thru Friday 6am-9pm.        Pending Results     Date and Time Order Name Status Description    2017 0727 XR Surgery GERARDO Fluoro L/T 5 Min w Stills In process             Admission Information        Provider Department Dept Phone    2017 Adilene Mcdonald MD [facility] Preop/Phase [phone]      Your Vitals Were     Blood Pressure Pulse Temperature    151/92 mmHg 65 98.5  F (36.9  C) (Oral)    Respirations Height Weight    16 1.803 m (5' 11\") 102.059 kg (225 lb)    BMI (Body Mass Index) Pulse Oximetry       31.39 kg/m2 96%       MyChart Information     [employer] lets you send messages to your doctor, view your test results, renew your prescriptions, schedule appointments and more. To sign up, go to [email or web address] . Click on \"Log in\" on the left side of the screen, which will take you to the Welcome page. Then click on \"Sign up Now\" on the right side of the page.     You will be asked to enter the access code listed below, as well as some personal information. Please follow the directions to create your username and password.     Your access code is: [email or web address]  Expires: 5/3/2017  3:15 PM     Your access code will  in 90 days. If you need help or a new code, please call your [place] clinic or [phone].        Care EveryWhere ID     This is your Care EveryWhere ID. This could be used by other organizations to access your [place] medical records  [id number]           Review of your medicines      CONTINUE these medicines which have NOT " CHANGED        Dose / Directions    amoxicillin-clavulanate 875-125 MG per tablet   Commonly known as:  AUGMENTIN   Used for:  Foreign body (FB) in soft tissue        Dose:  1 tablet   Take 1 tablet by mouth 2 times daily   Quantity:  20 tablet   Refills:  0       aspirin 81 MG tablet        Dose:  81 mg   Take 81 mg by mouth daily   Refills:  0       carvedilol 25 MG tablet   Commonly known as:  COREG   Used for:  Cardiomyopathy in other diseases classified elsewhere        Dose:  25 mg   Take 1 tablet (25 mg) by mouth 2 times daily (with meals)   Quantity:  180 tablet   Refills:  3       finasteride 5 MG tablet   Commonly known as:  PROSCAR   Used for:  Elevated PSA        Dose:  5 mg   Take 1 tablet (5 mg) by mouth daily   Quantity:  90 tablet   Refills:  3       lisinopril 20 MG tablet   Commonly known as:  PRINIVIL/ZESTRIL   Used for:  HTN (hypertension)        Dose:  20 mg   Take 1 tablet (20 mg) by mouth 2 times daily   Quantity:  180 tablet   Refills:  3       NEW MED   Used for:  Preop general physical exam        CO-Q 10 taking daily.   Refills:  0       pravastatin 40 MG tablet   Commonly known as:  PRAVACHOL   Used for:  New onset atrial fibrillation (H)        Dose:  40 mg   Take 1 tablet (40 mg) by mouth daily   Quantity:  90 tablet   Refills:  3       sildenafil 100 MG cap/tab   Commonly known as:  VIAGRA   Used for:  ED (erectile dysfunction)        Dose:  100 mg   Take 1 tablet (100 mg) by mouth daily as needed for erectile dysfunction   Quantity:  6 tablet   Refills:  10                Protect others around you: Learn how to safely use, store and throw away your medicines at www.disposemymeds.org.             Medication List: This is a list of all your medications and when to take them. Check marks below indicate your daily home schedule. Keep this list as a reference.      Medications           Morning Afternoon Evening Bedtime As Needed    amoxicillin-clavulanate 875-125 MG per tablet   Commonly  known as:  AUGMENTIN   Take 1 tablet by mouth 2 times daily                                aspirin 81 MG tablet   Take 81 mg by mouth daily                                carvedilol 25 MG tablet   Commonly known as:  COREG   Take 1 tablet (25 mg) by mouth 2 times daily (with meals)                                finasteride 5 MG tablet   Commonly known as:  PROSCAR   Take 1 tablet (5 mg) by mouth daily                                lisinopril 20 MG tablet   Commonly known as:  PRINIVIL/ZESTRIL   Take 1 tablet (20 mg) by mouth 2 times daily                                NEW MED   CO-Q 10 taking daily.                                pravastatin 40 MG tablet   Commonly known as:  PRAVACHOL   Take 1 tablet (40 mg) by mouth daily                                sildenafil 100 MG cap/tab   Commonly known as:  VIAGRA   Take 1 tablet (100 mg) by mouth daily as needed for erectile dysfunction

## 2017-02-09 NOTE — OP NOTE
DATE OF PROCEDURE:  02/07/2017      PREOPERATIVE DIAGNOSIS:  Left thumb retained foreign body.   POSTOPERATIVE DIAGNOSIS:  Left thumb retained foreign body.      PROCEDURE PERFORMED:     1.  Left thumb irrigation and debridement (skin, subcutaneous tissues and extensor tendon).   2.  Removal of retained foreign body, left thumb.      SURGEON:  Adilene Mcdonald MD   ASSISTANT:  None.      ANESTHESIA:  Local anesthetic for a local field block.   ESTIMATED BLOOD LOSS:  Less than 2  mL.   DRAINS:  None.   SPECIMENS:  None.   COMPLICATIONS:  None known.      INDICATIONS:  Doron Beltran is a 61-year-old male who sustained a laceration to the dorsum of his left thumb, resulting in a retained radiopaque foreign body.  He was initially seen at his primary care provider's office where local wound care was administered and a course of antibiotics started.  He presented to my office with radiographic evidence of a retained radiopaque foreign body.  The retained foreign body was symptomatic when direct pressure was applied over the laceration or with repetitive use of the thumb.  Treatment options were discussed with the patient in detail including conservative and surgical management.  I offered surgical intervention to explore the affected structures and remove the retained foreign body.  He understands the risks of surgery include but are not limited to following:  Problems with anesthesia, deep or superficial infection, sensitive scars, wound healing problems, stiffness, numbness, damage to surrounding anatomical structures including nerves, vessels or tendons, persistent pain, problems returning to work or activity, need for additional surgery despite our efforts today.  The patient expressed understanding and wished to proceed.      DESCRIPTION OF PROCEDURE:  The patient was met in the preoperative holding area and the correct extremity, the left thumb was identified and marked.  He was brought to the operative suite and  placed supine on the operating room table.  Bony prominences were well padded.  A well-padded nonsterile tourniquet was placed on his left forearm.  IV antibiotics were administered.  A perioperative pause confirmed the correct patient, the correct procedure and the correct extremity.  The left upper extremity was then prepped and draped in the standard sterile fashion.  Surgical arm was then elevated, exsanguinated, and the tourniquet elevated to 250mm Hg.      His traumatic transverse laceration over the dorsum of the thumb was identified.  This was extended to allow for better visualization.  Full-thickness skin flaps were raised.  Bipolar electrocautery was used for meticulous hemostasis.  Careful dissection proceeded through subcutaneous tissues.  There was no evidence of purulence or necrotic tissue in the wound bed.  The EPL tendon was identified.  Fluoroscopy was used to assist with localization of the small radiopaque foreign body.  The foreign body had actually lodged itself into the dorsal aspect of the EPL tendon.  This was underneath a layer of tenosynovium.  The tissue surrounding the EPL tendon was carefully incised longitudinally, taking care to protect the underlying EPL tendon fibers.  The radiopaque foreign body was then removed in its entirety without complication.  The underlying EPL tendon was examined and found to be without evidence of laceration or tear.  Copious amounts of the standard sterile saline were used to irrigate the involved tissue including skin, subcutaneous tissues and extensor tendon.  Hemostat was used to carefully debride the skin, subcutaneous tissues and extensor tendon during irrigation.  Tourniquet was released after approximately 17 minutes, with brisk return of capillary refill to all digits.  Interrupted 4-0 nylon sutures were used to reapproximate the wound.  Sterile dressings were applied consisting of bacitracin, Adaptic, 4 x 4s and sterile Webril followed by an  Ace bandage.  The patient was then taken to recovery unit in stable condition having tolerated the procedure well.  Sponge and instrument counts were correct at the conclusion of the procedure, which was performed under loupe magnification.      POSTOPERATIVE PLAN:  The patient may change his dressing after 72 hours.  We discussed that the wound should be covered with a dressing until his followup clinic appointment.  The patient has a 2-week snowmobiling trip planned to [place] this weekend.  He is comfortable having any family member remove his sutures at 14 days postop.  He will follow-up in clinic when he returns from his vacation.  Prescription for Norco 5/325 provided today.         VEE PAGAN MD             D: 2017 12:48   T: 2017 21:55   MT: ALISON#[id number]      Name:     KASEY FRIEDMAN   MRN:      [id number]        Account:        [id number]   :      1955           Procedure Date: 2017      Document: [id number]

## 2017-02-28 DIAGNOSIS — I42.9 CARDIOMYOPATHY, UNSPECIFIED (H): Primary | ICD-10-CM

## 2017-02-28 RX ORDER — CARVEDILOL 25 MG/1
25 TABLET ORAL 2 TIMES DAILY WITH MEALS
Qty: 180 TABLET | Refills: 3 | Status: SHIPPED | OUTPATIENT
Start: 2017-02-28 | End: 2018-03-09

## 2017-05-10 DIAGNOSIS — N40.1 BPH (BENIGN PROSTATIC HYPERTROPHY) WITH URINARY OBSTRUCTION: ICD-10-CM

## 2017-05-10 DIAGNOSIS — N13.8 BPH (BENIGN PROSTATIC HYPERTROPHY) WITH URINARY OBSTRUCTION: ICD-10-CM

## 2017-05-10 DIAGNOSIS — R97.20 ELEVATED PSA: ICD-10-CM

## 2017-05-10 LAB — PSA SERPL-MCNC: 5.58 UG/L (ref 0–4)

## 2017-05-10 PROCEDURE — 84153 ASSAY OF PSA TOTAL: CPT | Performed by: UROLOGY

## 2017-05-10 PROCEDURE — 36415 COLL VENOUS BLD VENIPUNCTURE: CPT | Performed by: UROLOGY

## 2017-05-22 ENCOUNTER — OFFICE VISIT (OUTPATIENT)
Dept: UROLOGY | Facility: CLINIC | Age: 62
End: 2017-05-22
Payer: COMMERCIAL

## 2017-05-22 VITALS — SYSTOLIC BLOOD PRESSURE: 136 MMHG | DIASTOLIC BLOOD PRESSURE: 84 MMHG | HEART RATE: 72 BPM | RESPIRATION RATE: 16 BRPM

## 2017-05-22 DIAGNOSIS — N40.1 BPH (BENIGN PROSTATIC HYPERTROPHY) WITH URINARY OBSTRUCTION: ICD-10-CM

## 2017-05-22 DIAGNOSIS — R97.20 ELEVATED PSA: Primary | ICD-10-CM

## 2017-05-22 DIAGNOSIS — N52.9 ERECTILE DYSFUNCTION, UNSPECIFIED ERECTILE DYSFUNCTION TYPE: ICD-10-CM

## 2017-05-22 DIAGNOSIS — N13.8 BPH (BENIGN PROSTATIC HYPERTROPHY) WITH URINARY OBSTRUCTION: ICD-10-CM

## 2017-05-22 PROCEDURE — 99214 OFFICE O/P EST MOD 30 MIN: CPT | Mod: 25 | Performed by: UROLOGY

## 2017-05-22 PROCEDURE — 51798 US URINE CAPACITY MEASURE: CPT | Performed by: UROLOGY

## 2017-05-22 RX ORDER — SILDENAFIL 100 MG/1
100 TABLET, FILM COATED ORAL DAILY PRN
Qty: 60 TABLET | Refills: 3 | Status: SHIPPED | OUTPATIENT
Start: 2017-05-22 | End: 2022-06-07

## 2017-05-22 RX ORDER — TAMSULOSIN HYDROCHLORIDE 0.4 MG/1
0.4 CAPSULE ORAL AT BEDTIME
Qty: 30 CAPSULE | Refills: 1 | Status: SHIPPED | OUTPATIENT
Start: 2017-05-22 | End: 2017-06-22

## 2017-05-22 NOTE — PROGRESS NOTES
SUBJECTIVE:  Kasey Friedman is a pleasant 61-year-old gentleman who presents to clinic today for consultation with regard to several urological issues.  The patient is a patient of Dr. Gonzales in the past.  He has history of elevated PSA, BPH and erectile dysfunction.  The patient has had 2 previous prostate biopsies for elevated PSA.  The last one was in  by Dr. Gonzales.  He also had an MRI of the prostate that did not show any obvious nodule.  His most recent PSA is 5.5.  Since he is on finasteride his actual PSA is 11.  However, that number looks stable comparing to what it was 6 months ago.  He does have some urination issue despite finasteride.  He has some nocturia, frequency, slow urinary stream.  The patient has not tried an alpha blocker in the past.  He also uses Viagra for erectile dysfunction.      ASSESSMENT:   1.  Elevated PSA, stable, will check his PSA in 6 months from now.   2.  BPH with urinary obstruction.  Bladder scan today showed residual urine of only 32 mL.  Given his urination issue, I am going to add Flomax also to his regimen.   3.  Erectile dysfunction.  Continue Viagra.  The actions between Viagra and Flomax discussed with the patient at length.         HAMIDA BATES MD             D: 2017 10:01   T: 2017 10:22   MT: [id number]      Name:     KASEY FRIEDMAN   MRN:      [id number]        Account:      [id number]   :      1955           Visit Date:   2017      Document: [id number]       cc: Fahad Fisher MD

## 2017-05-22 NOTE — PROGRESS NOTES
Chief Complaint   Patient presents with     RECHECK       Doron Feldman is a 61 year old male who presents today for follow up of   Chief Complaint   Patient presents with     RECHECK    See dictated note     Current Outpatient Prescriptions   Medication Sig Dispense Refill     tamsulosin (FLOMAX) 0.4 MG capsule Take 1 capsule (0.4 mg) by mouth At Bedtime 30 capsule 1     sildenafil (VIAGRA) 100 MG cap/tab Take 1 tablet (100 mg) by mouth daily as needed for erectile dysfunction 60 tablet 3     carvedilol (COREG) 25 MG tablet Take 1 tablet (25 mg) by mouth 2 times daily (with meals) 180 tablet 3     NEW MED CO-Q 10 taking daily.       pravastatin (PRAVACHOL) 40 MG tablet Take 1 tablet (40 mg) by mouth daily 90 tablet 3     lisinopril (PRINIVIL/ZESTRIL) 20 MG tablet Take 1 tablet (20 mg) by mouth 2 times daily 180 tablet 3     finasteride (PROSCAR) 5 MG tablet Take 1 tablet (5 mg) by mouth daily 90 tablet 3     aspirin 81 MG tablet Take 81 mg by mouth daily       amoxicillin-clavulanate (AUGMENTIN) 875-125 MG per tablet Take 1 tablet by mouth 2 times daily (Patient not taking: Reported on 5/22/2017) 20 tablet 0     [DISCONTINUED] sildenafil (VIAGRA) 100 MG tablet Take 1 tablet (100 mg) by mouth daily as needed for erectile dysfunction 6 tablet 10     Allergies   Allergen Reactions     Nka [No Known Allergies]       Past Medical History:   Diagnosis Date     Atrial fibrillation (H) 9/23/10     CARDIAC DYSRHYTHMIAS NEC 4/3/2008     Cardiomyopathy, idiopathic (H)      Coronary artery disease     Stent     Elevated PSA 10/7/2010     HTN (hypertension) 4/25/2011     Hyperlipidemia      Hypertension      TYLER (obstructive sleep apnea)     CPAP     Shortness of breath      Past Surgical History:   Procedure Laterality Date     CARDIAC CATHERIZATION  2011    mid RCA stenosis of 85-90%-BMS     H ABLATION FOCAL AFIB  3/27/14     ORTHOPEDIC SURGERY      knee- both arthrocsopic     ORTHOPEDIC SURGERY      left pinky finger      REMOVE FOREIGN BODY FINGER Left 2/7/2017    Procedure: REMOVE FOREIGN BODY FINGER;  Surgeon: Adilene Mcdonald MD;  Location: [facility]     SURGICAL HISTORY OF -       right knee arthoscopic     SURGICAL HISTORY OF -       left 5th finger surgery     Family History   Problem Relation Age of Onset     DIABETES Father      CEREBROVASCULAR DISEASE Father      Unknown/Adopted Maternal Grandfather      Unknown/Adopted Paternal Grandmother      CANCER Paternal Grandfather      unknown     Cardiovascular Mother      had pacemaker placed unknown reason why     Social History     Social History     Marital status:      Spouse name: N/A     Number of children: N/A     Years of education: N/A     Social History Main Topics     Smoking status: Never Smoker     Smokeless tobacco: Never Used     Alcohol use Yes      Comment: 2-3 drinks per week     Drug use: No     Sexual activity: Yes     Partners: Female     Other Topics Concern     Parent/Sibling W/ Cabg, Mi Or Angioplasty Before 65f 55m? No     Caffeine Concern Yes     2 cups caffeine per day     Sleep Concern Yes     sleep apnea, wears cpap at night     Stress Concern No     Weight Concern No     Special Diet No     Back Care No     Exercise Yes     knee exercises, weights     Social History Narrative       REVIEW OF SYSTEMS  =================  C: NEGATIVE for fever, chills, change in weight  I: NEGATIVE for worrisome rashes, moles or lesions  E/M: NEGATIVE for ear, mouth and throat problems  R: NEGATIVE for significant cough or SHORTNESS OF BREATH,   CV: NEGATIVE for chest pain, palpitations or peripheral edema  GI: NEGATIVE for nausea, abdominal pain, heartburn, or change in bowel habits  NEURO: NEGATIVE any motor/sensory changes  PSYCH: NEGATIVE for recent mood disorder    Physical Exam:  /84 (BP Location: Right arm, Patient Position: Chair, Cuff Size: Adult Large)  Pulse 72  Resp 16   Patient is pleasant, in no acute distress, good general  condition.  Lung: no evidence of respiratory distress    Abdomen: Soft, nondistended, non tender. No masses. No rebound or guarding.   Exam: penis no d/c. Testis no masses.  No scrotal skin lesion.  Prostate 70 ml smooth.  Skin: Warm and dry.  No redness.  Psych: normal mood and affect  Neuro: alert and oriented    Assessment/Plan:   (R97.20) Elevated PSA  (primary encounter diagnosis)  Comment:    Plan: PSA tumor marker        In six months    (N40.1,  N13.8) BPH (benign prostatic hypertrophy) with urinary obstruction  Comment:    Plan: MEASURE POST-VOID RESIDUAL URINE/BLADDER         CAPACITY, US NON-IMAGING (30508), tamsulosin         (FLOMAX) 0.4 MG capsule         See dictated note     (N52.9) Erectile dysfunction, unspecified erectile dysfunction type  Comment:    Plan: sildenafil (VIAGRA) 100 MG cap/tab        See dictated note

## 2017-05-22 NOTE — MR AVS SNAPSHOT
"              After Visit Summary   5/22/2017    Doron Feldman    MRN: [id number]           Patient Information     Date Of Birth          1955        Visit Information        Provider Department      5/22/2017 9:15 AM César Begum MD [facility]        Today's Diagnoses     Elevated PSA    -  1    BPH (benign prostatic hypertrophy) with urinary obstruction        Erectile dysfunction, unspecified erectile dysfunction type          Care Instructions    Please call our office if you have questions or need to report any information, [phone].        Follow-ups after your visit        Future tests that were ordered for you today     Open Future Orders        Priority Expected Expires Ordered    PSA tumor marker Routine 11/21/2017 5/23/2018 5/22/2017            Who to contact     If you have questions or need follow up information about today's clinic visit or your schedule please contact [facility] directly at [phone].  Normal or non-critical lab and imaging results will be communicated to you by MyChart, letter or phone within 4 business days after the clinic has received the results. If you do not hear from us within 7 days, please contact the clinic through MyChart or phone. If you have a critical or abnormal lab result, we will notify you by phone as soon as possible.  Submit refill requests through [employer] or call your pharmacy and they will forward the refill request to us. Please allow 3 business days for your refill to be completed.          Additional Information About Your Visit        MyChart Information     [employer] lets you send messages to your doctor, view your test results, renew your prescriptions, schedule appointments and more. To sign up, go to [email or web address] . Click on \"Log in\" on the left side of the screen, which will take you to the Welcome page. Then click on \"Sign up Now\" on the right side of the page.     You will be asked to enter " the access code listed below, as well as some personal information. Please follow the directions to create your username and password.     Your access code is: [email or web address]  Expires: 2017  9:41 AM     Your access code will  in 90 days. If you need help or a new code, please call your [place] clinic or [phone].        Care EveryWhere ID     This is your Care EveryWhere ID. This could be used by other organizations to access your [place] medical records  [id number]        Your Vitals Were     Pulse Respirations                72 16           Blood Pressure from Last 3 Encounters:   17 136/84   17 (!) 151/92   17 106/70    Weight from Last 3 Encounters:   17 102.1 kg (225 lb)   17 104.3 kg (230 lb)   17 104.7 kg (230 lb 14.4 oz)              We Performed the Following     MEASURE POST-VOID RESIDUAL URINE/BLADDER CAPACITY, US NON-IMAGING (79057)          Today's Medication Changes          These changes are accurate as of: 17  9:41 AM.  If you have any questions, ask your nurse or doctor.               Start taking these medicines.        Dose/Directions    tamsulosin 0.4 MG capsule   Commonly known as:  FLOMAX   Used for:  BPH (benign prostatic hypertrophy) with urinary obstruction   Started by:  César Begum MD        Dose:  0.4 mg   Take 1 capsule (0.4 mg) by mouth At Bedtime   Quantity:  30 capsule   Refills:  1            Where to get your medicines      These medications were sent to [facility] - [place] - [address]  [address] [place] [facility] [place] [address]     Phone:  [phone]     tamsulosin 0.4 MG capsule         Some of these will need a paper prescription and others can be bought over the counter.  Ask your nurse if you have questions.     Bring a paper prescription for each of these medications     sildenafil 100 MG cap/tab                Primary Care Provider Office Phone # Fax #     Fahad Fisher [phone]       [facility] [address] [address]        Thank you!     Thank you for choosing [facility]  for your care. Our goal is always to provide you with excellent care. Hearing back from our patients is one way we can continue to improve our services. Please take a few minutes to complete the written survey that you may receive in the mail after your visit with us. Thank you!             Your Updated Medication List - Protect others around you: Learn how to safely use, store and throw away your medicines at www.disposemymeds.org.          This list is accurate as of: 5/22/17  9:41 AM.  Always use your most recent med list.                   Brand Name Dispense Instructions for use    amoxicillin-clavulanate 875-125 MG per tablet    AUGMENTIN    20 tablet    Take 1 tablet by mouth 2 times daily       aspirin 81 MG tablet      Take 81 mg by mouth daily       carvedilol 25 MG tablet    COREG    180 tablet    Take 1 tablet (25 mg) by mouth 2 times daily (with meals)       finasteride 5 MG tablet    PROSCAR    90 tablet    Take 1 tablet (5 mg) by mouth daily       lisinopril 20 MG tablet    PRINIVIL/ZESTRIL    180 tablet    Take 1 tablet (20 mg) by mouth 2 times daily       NEW MED      CO-Q 10 taking daily.       pravastatin 40 MG tablet    PRAVACHOL    90 tablet    Take 1 tablet (40 mg) by mouth daily       sildenafil 100 MG cap/tab    VIAGRA    60 tablet    Take 1 tablet (100 mg) by mouth daily as needed for erectile dysfunction       tamsulosin 0.4 MG capsule    FLOMAX    30 capsule    Take 1 capsule (0.4 mg) by mouth At Bedtime

## 2017-05-22 NOTE — NURSING NOTE
"Chief Complaint   Patient presents with     RECHECK       Initial /84 (BP Location: Right arm, Patient Position: Chair, Cuff Size: Adult Large)  Pulse 72  Resp 16 Estimated body mass index is 31.38 kg/(m^2) as calculated from the following:    Height as of 2/7/17: 1.803 m (5' 11\").    Weight as of 2/7/17: 102.1 kg (225 lb).  Medication Reconciliation: complete   Oneida Wright CMA      "

## 2017-06-22 ENCOUNTER — TELEPHONE (OUTPATIENT)
Dept: UROLOGY | Facility: CLINIC | Age: 62
End: 2017-06-22

## 2017-06-22 DIAGNOSIS — N13.8 BPH (BENIGN PROSTATIC HYPERTROPHY) WITH URINARY OBSTRUCTION: ICD-10-CM

## 2017-06-22 DIAGNOSIS — N40.1 BPH (BENIGN PROSTATIC HYPERTROPHY) WITH URINARY OBSTRUCTION: ICD-10-CM

## 2017-06-22 RX ORDER — TAMSULOSIN HYDROCHLORIDE 0.4 MG/1
0.4 CAPSULE ORAL AT BEDTIME
Qty: 90 CAPSULE | Refills: 1 | Status: SHIPPED | OUTPATIENT
Start: 2017-06-22 | End: 2017-12-11

## 2017-06-22 NOTE — TELEPHONE ENCOUNTER
Reason for call:  Medication   If this is a refill request, has the caller requested the refill from the pharmacy already? No  Will the patient be using a [facility]? No  Name of the pharmacy and phone number for the current request: [facility] - [place], [place] - [address]    Name of the medication requested: tamsulosin (FLOMAX) 0.4 MG capsule    Other request: Patient believes this is working for him and would like the 3 month refill sent to [facility] mail order.    Phone number to reach patient:  Home number on file Cell  [phone]    Best Time:  ASAP    Can we leave a detailed message on this number?  YES

## 2017-06-22 NOTE — TELEPHONE ENCOUNTER
Signed Prescriptions:                        Disp   Refills    tamsulosin (FLOMAX) 0.4 MG capsule         90 cap*1        Sig: Take 1 capsule (0.4 mg) by mouth At Bedtime  Authorizing Provider: HAMIDA BATES  Ordering User: MARLENE GIBBS    Prescription approved per [facility] Refill Protocol.    Marlene Gibbs RN

## 2017-09-18 ENCOUNTER — HOSPITAL ENCOUNTER (OUTPATIENT)
Dept: CARDIOLOGY | Facility: CLINIC | Age: 62
Discharge: HOME | End: 2017-09-18
Attending: INTERNAL MEDICINE | Admitting: INTERNAL MEDICINE
Payer: COMMERCIAL

## 2017-09-18 ENCOUNTER — OFFICE VISIT (OUTPATIENT)
Dept: CARDIOLOGY | Facility: CLINIC | Age: 62
End: 2017-09-18
Attending: INTERNAL MEDICINE
Payer: COMMERCIAL

## 2017-09-18 VITALS
BODY MASS INDEX: 33.32 KG/M2 | SYSTOLIC BLOOD PRESSURE: 139 MMHG | WEIGHT: 238 LBS | HEIGHT: 71 IN | HEART RATE: 64 BPM | DIASTOLIC BLOOD PRESSURE: 78 MMHG

## 2017-09-18 DIAGNOSIS — I48.19 PERSISTENT ATRIAL FIBRILLATION (H): ICD-10-CM

## 2017-09-18 DIAGNOSIS — R97.20 ELEVATED PSA: ICD-10-CM

## 2017-09-18 LAB
ANION GAP SERPL CALCULATED.3IONS-SCNC: 12.9 MMOL/L (ref 6–17)
BUN SERPL-MCNC: 26 MG/DL (ref 7–30)
CALCIUM SERPL-MCNC: 9.3 MG/DL (ref 8.5–10.5)
CHLORIDE SERPL-SCNC: 105 MMOL/L (ref 98–107)
CHOLEST SERPL-MCNC: 197 MG/DL
CO2 SERPL-SCNC: 24 MMOL/L (ref 23–29)
CREAT SERPL-MCNC: 0.86 MG/DL (ref 0.7–1.3)
GFR SERPL CREATININE-BSD FRML MDRD: >90 ML/MIN/1.7M2
GLUCOSE SERPL-MCNC: 106 MG/DL (ref 70–105)
HDLC SERPL-MCNC: 38 MG/DL
LDLC SERPL CALC-MCNC: 126 MG/DL
NONHDLC SERPL-MCNC: 159 MG/DL
POTASSIUM SERPL-SCNC: 3.9 MMOL/L (ref 3.5–5.1)
SODIUM SERPL-SCNC: 138 MMOL/L (ref 136–145)
TRIGL SERPL-MCNC: 167 MG/DL

## 2017-09-18 PROCEDURE — 99214 OFFICE O/P EST MOD 30 MIN: CPT | Performed by: NURSE PRACTITIONER

## 2017-09-18 PROCEDURE — 40000264 ECHO COMPLETE WITH LUMASON

## 2017-09-18 PROCEDURE — 25500064 ZZH RX 255 OP 636: Performed by: INTERNAL MEDICINE

## 2017-09-18 PROCEDURE — 36415 COLL VENOUS BLD VENIPUNCTURE: CPT | Performed by: NURSE PRACTITIONER

## 2017-09-18 PROCEDURE — 80061 LIPID PANEL: CPT | Performed by: NURSE PRACTITIONER

## 2017-09-18 PROCEDURE — 93306 TTE W/DOPPLER COMPLETE: CPT | Mod: 26 | Performed by: INTERNAL MEDICINE

## 2017-09-18 PROCEDURE — 80048 BASIC METABOLIC PNL TOTAL CA: CPT | Performed by: NURSE PRACTITIONER

## 2017-09-18 RX ORDER — AMLODIPINE BESYLATE 2.5 MG/1
2.5 TABLET ORAL DAILY
Qty: 90 TABLET | Refills: 3 | Status: SHIPPED | OUTPATIENT
Start: 2017-09-18 | End: 2017-10-31

## 2017-09-18 RX ORDER — FINASTERIDE 5 MG/1
5 TABLET, FILM COATED ORAL DAILY
Qty: 90 TABLET | Refills: 3 | Status: SHIPPED | OUTPATIENT
Start: 2017-09-18 | End: 2018-08-29

## 2017-09-18 RX ADMIN — SULFUR HEXAFLUORIDE 2 ML: KIT at 10:07

## 2017-09-18 NOTE — PROGRESS NOTES
HPI and Plan:   See dictation    Orders Placed This Encounter   Procedures     Basic metabolic panel     Lipid panel reflex to direct LDL     Follow-Up with Cardiac Advanced Practice Provider       Orders Placed This Encounter   Medications     amLODIPine (NORVASC) 2.5 MG tablet     Sig: Take 1 tablet (2.5 mg) by mouth daily     Dispense:  90 tablet     Refill:  3     finasteride (PROSCAR) 5 MG tablet     Sig: Take 1 tablet (5 mg) by mouth daily     Dispense:  90 tablet     Refill:  3       Medications Discontinued During This Encounter   Medication Reason     amoxicillin-clavulanate (AUGMENTIN) 875-125 MG per tablet Therapy completed     finasteride (PROSCAR) 5 MG tablet Reorder         Encounter Diagnoses   Name Primary?     Persistent atrial fibrillation (H)      Elevated PSA        CURRENT MEDICATIONS:  Current Outpatient Prescriptions   Medication Sig Dispense Refill     amLODIPine (NORVASC) 2.5 MG tablet Take 1 tablet (2.5 mg) by mouth daily 90 tablet 3     finasteride (PROSCAR) 5 MG tablet Take 1 tablet (5 mg) by mouth daily 90 tablet 3     tamsulosin (FLOMAX) 0.4 MG capsule Take 1 capsule (0.4 mg) by mouth At Bedtime 90 capsule 1     sildenafil (VIAGRA) 100 MG cap/tab Take 1 tablet (100 mg) by mouth daily as needed for erectile dysfunction 60 tablet 3     carvedilol (COREG) 25 MG tablet Take 1 tablet (25 mg) by mouth 2 times daily (with meals) 180 tablet 3     NEW MED CO-Q 10 taking daily.       pravastatin (PRAVACHOL) 40 MG tablet Take 1 tablet (40 mg) by mouth daily 90 tablet 3     lisinopril (PRINIVIL/ZESTRIL) 20 MG tablet Take 1 tablet (20 mg) by mouth 2 times daily 180 tablet 3     aspirin 81 MG tablet Take 81 mg by mouth daily         ALLERGIES     Allergies   Allergen Reactions     Nka [No Known Allergies]        PAST MEDICAL HISTORY:  Past Medical History:   Diagnosis Date     Atrial fibrillation (H) 9/23/10     CARDIAC DYSRHYTHMIAS NEC 4/3/2008     Cardiomyopathy, idiopathic (H)      Coronary artery  disease     Stent     Elevated PSA 10/7/2010     HTN (hypertension) 4/25/2011     Hyperlipidemia      Hypertension      TYLER (obstructive sleep apnea)     CPAP     Shortness of breath        PAST SURGICAL HISTORY:  Past Surgical History:   Procedure Laterality Date     CARDIAC CATHERIZATION  2011    mid RCA stenosis of 85-90%-BMS     H ABLATION FOCAL AFIB  3/27/14     ORTHOPEDIC SURGERY      knee- both arthrocsopic     ORTHOPEDIC SURGERY      left pinky finger     REMOVE FOREIGN BODY FINGER Left 2/7/2017    Procedure: REMOVE FOREIGN BODY FINGER;  Surgeon: Adilene Mcdonald MD;  Location: [facility]     SURGICAL HISTORY OF -       right knee arthoscopic     SURGICAL HISTORY OF -       left 5th finger surgery       FAMILY HISTORY:  Family History   Problem Relation Age of Onset     DIABETES Father      CEREBROVASCULAR DISEASE Father      Unknown/Adopted Maternal Grandfather      Unknown/Adopted Paternal Grandmother      CANCER Paternal Grandfather      unknown     Cardiovascular Mother      had pacemaker placed unknown reason why       SOCIAL HISTORY:  Social History     Social History     Marital status:      Spouse name: N/A     Number of children: N/A     Years of education: N/A     Social History Main Topics     Smoking status: Never Smoker     Smokeless tobacco: Never Used     Alcohol use Yes      Comment: 2-3 drinks per week     Drug use: No     Sexual activity: Yes     Partners: Female     Other Topics Concern     Parent/Sibling W/ Cabg, Mi Or Angioplasty Before 65f 55m? No     Caffeine Concern Yes     2 cups caffeine per day     Sleep Concern Yes     sleep apnea, wears cpap at night     Stress Concern No     Weight Concern No     Special Diet No     Back Care No     Exercise Yes     knee exercises, weights     Social History Narrative       Review of Systems:  Skin:  Negative rash leg   Eyes:  Positive for   reading glasses  ENT:  Negative      Respiratory:  Positive for sleep apnea;CPAP    "  Cardiovascular:  palpitations;chest pain;lightheadedness;dizziness;syncope or near-syncope;cyanosis;fatigue;edema;exercise intolerance;Negative for      Gastroenterology: Negative      Genitourinary:  Negative      Musculoskeletal:  Positive for joint pain    Neurologic:  Negative      Psychiatric:  Negative      Heme/Lymph/Imm:  Negative      Endocrine:  Negative        Physical Exam:  Vitals: /78  Pulse 64  Ht 1.803 m (5' 11\")  Wt 108 kg (238 lb)  BMI 33.19 kg/m2    Constitutional:           Skin:           Head:           Eyes:           ENT:           Neck:           Chest:             Cardiac:                    Abdomen:           Vascular:                                          Extremities and Back:                 Neurological:                 CC  Gina Martinez MD  [address]  [place] [address]              "

## 2017-09-18 NOTE — MR AVS SNAPSHOT
After Visit Summary   9/18/2017    Doron Feldman    MRN: [id number]           Patient Information     Date Of Birth          1955        Visit Information        Provider Department      9/18/2017 12:30 PM Adrianna Amor APRN CNP [facility] HEART AT [place]        Today's Diagnoses     Persistent atrial fibrillation (H)          Care Instructions    -Start amlodipine 2.5 mg for blood pressure  -Labs today to check electrolytes and cholesterol and we will call with results  -Follow up [place] for BP check  -Call me with any questions or concerns [phone]          Follow-ups after your visit        Additional Services     Follow-Up with Cardiac Advanced Practice Provider                 Your next 10 appointments already scheduled     Nov 27, 2017  9:00 AM CST   [facility]    [address] [place] [id number] [phone]              Future tests that were ordered for you today     Open Future Orders        Priority Expected Expires Ordered    Follow-Up with Cardiac Advanced Practice Provider Routine 11/1/2017 9/18/2019 9/18/2017    Basic metabolic panel Routine 9/18/2017 9/18/2018 9/18/2017    Lipid panel reflex to direct LDL Routine 9/18/2017 9/18/2018 9/18/2017    Echo Complete with Lumason Routine 9/6/2017 10/14/2017 5/10/2016            Who to contact     If you have questions or need follow up information about today's clinic visit or your schedule please contact [facility] directly at [phone].  Normal or non-critical lab and imaging results will be communicated to you by MyChart, letter or phone within 4 business days after the clinic has received the results. If you do not hear from us within 7 days, please contact the clinic through MyChart or phone. If you have a critical or abnormal lab result, we will  "notify you by phone as soon as possible.  Submit refill requests through Agent[employer] or call your pharmacy and they will forward the refill request to us. Please allow 3 business days for your refill to be completed.          Additional Information About Your Visit        SiteBrainshart Information     AgentPair lets you send messages to your doctor, view your test results, renew your prescriptions, schedule appointments and more. To sign up, go to [email or web address] . Click on \"Log in\" on the left side of the screen, which will take you to the Welcome page. Then click on \"Sign up Now\" on the right side of the page.     You will be asked to enter the access code listed below, as well as some personal information. Please follow the directions to create your username and password.     Your access code is: [email or web address]  Expires: 2017 12:52 PM     Your access code will  in 90 days. If you need help or a new code, please call your [facility] clinic or [phone].        Care EveryWhere ID     This is your Care EveryWhere ID. This could be used by other organizations to access your [facility] medical records  [id number]        Your Vitals Were     Pulse Height BMI (Body Mass Index)             64 1.803 m (5' 11\") 33.19 kg/m2          Blood Pressure from Last 3 Encounters:   17 139/78   17 136/84   17 (!) 151/92    Weight from Last 3 Encounters:   17 108 kg (238 lb)   17 102.1 kg (225 lb)   17 104.3 kg (230 lb)              We Performed the Following     Follow-Up with Cardiac Advanced Practice Provider        Primary Care Provider Office Phone # Fax #    Fahad Fisher [phone] [address] [id number]        Equal Access to Services     AC GUAJARDO : Leann Morley, rubin menjivar, qalluvia kavivi nolasco, william velarde. [facility] [phone].    ATENCIÓN: Si gabe ruiz snell " disposición servicios gratuitos de asistencia lingüística. Arnold erickson [phone].    We comply with applicable federal civil rights laws and [place] laws. We do not discriminate on the basis of race, color, national origin, age, disability sex, sexual orientation or gender identity.            Thank you!     Thank you for choosing [facility] AT [place]  for your care. Our goal is always to provide you with excellent care. Hearing back from our patients is one way we can continue to improve our services. Please take a few minutes to complete the written survey that you may receive in the mail after your visit with us. Thank you!             Your Updated Medication List - Protect others around you: Learn how to safely use, store and throw away your medicines at www.disposemymeds.org.          This list is accurate as of: 9/18/17 12:52 PM.  Always use your most recent med list.                   Brand Name Dispense Instructions for use Diagnosis    aspirin 81 MG tablet      Take 81 mg by mouth daily        carvedilol 25 MG tablet    COREG    180 tablet    Take 1 tablet (25 mg) by mouth 2 times daily (with meals)    Cardiomyopathy, unspecified (H)       finasteride 5 MG tablet    PROSCAR    90 tablet    Take 1 tablet (5 mg) by mouth daily    Elevated PSA       lisinopril 20 MG tablet    PRINIVIL/ZESTRIL    180 tablet    Take 1 tablet (20 mg) by mouth 2 times daily    HTN (hypertension)       NEW MED      CO-Q 10 taking daily.    Preop general physical exam       pravastatin 40 MG tablet    PRAVACHOL    90 tablet    Take 1 tablet (40 mg) by mouth daily    New onset atrial fibrillation (H)       sildenafil 100 MG tablet    VIAGRA    60 tablet    Take 1 tablet (100 mg) by mouth daily as needed for erectile dysfunction    Erectile dysfunction, unspecified erectile dysfunction type       tamsulosin 0.4 MG capsule    FLOMAX    90 capsule    Take 1 capsule (0.4 mg) by mouth At Bedtime    BPH  (benign prostatic hypertrophy) with urinary obstruction

## 2017-09-18 NOTE — PATIENT INSTRUCTIONS
-Start amlodipine 2.5 mg for blood pressure  -Labs today to check electrolytes and cholesterol and we will call with results  -Follow up [place] for BP check  -Call me with any questions or concerns [phone]

## 2017-09-18 NOTE — LETTER
9/18/2017    Fahad Fisher MD  [address] [facility] [address]    RE: Doron Beltran       Dear Colleague,    I had the pleasure of seeing Doron Beltran in the [facility].    Mr. Beltran is a 61-year-old patient presenting in clinic today for an annual followup visit.  He is a patient of Dr. Martinez.  I am meeting for the first time today.  He has a history of a cardiomyopathy.  He has persistent atrial fibrillation, status post catheter ablation in 2014 without any recurrence since that time.  He has coronary disease and underwent RCA stenting in 2011.  He has been off of anticoagulation and on aspirin 81 mg alone.      In clinic today, Mr. Beltran tells me he has been feeling well.  He is retired but remains very active.  He has no exertional symptoms, denying chest pain, breathlessness, lightheadedness, dizziness, presyncope, syncope or palpitations.  He does have a primary care doctor, but freely admits that he does not follow as often as he should.      He had an echocardiogram done at our office today showing a normal ejection fraction of 50%-55%.  There is mild left ventricular hypertrophy.  Also seen with contrast was a mid distal/high lateral wall hypokinesia, which appeared to be new from 2014.  The remainder of the study was essentially within normal limits without any significant valvular disease.      I see that cholesterol was checked last spring showing a total cholesterol of 174, HDL 30, LDL of 78 and non-HDL of 144.  Triglycerides were elevated at 332.      PHYSICAL EXAMINATION:   GENERAL:  Well-appearing, overweight gentleman in no acute distress.   HEENT:  Normocephalic, atraumatic.   NECK:  Supple without jugular venous distention.   LUNGS:  Clear.   HEART:  S1, S2, regular rate and rhythm, no murmurs or gallops.   ABDOMEN:  Round, but soft.   EXTREMITIES:  Lower extremities free of any edema.   Outpatient Encounter Prescriptions as of 9/18/2017    Medication Sig Dispense Refill     finasteride (PROSCAR) 5 MG tablet Take 1 tablet (5 mg) by mouth daily 90 tablet 3     [DISCONTINUED] amLODIPine (NORVASC) 2.5 MG tablet Take 1 tablet (2.5 mg) by mouth daily 90 tablet 3     tamsulosin (FLOMAX) 0.4 MG capsule Take 1 capsule (0.4 mg) by mouth At Bedtime 90 capsule 1     sildenafil (VIAGRA) 100 MG cap/tab Take 1 tablet (100 mg) by mouth daily as needed for erectile dysfunction 60 tablet 3     carvedilol (COREG) 25 MG tablet Take 1 tablet (25 mg) by mouth 2 times daily (with meals) 180 tablet 3     NEW MED CO-Q 10 taking daily.       pravastatin (PRAVACHOL) 40 MG tablet Take 1 tablet (40 mg) by mouth daily 90 tablet 3     lisinopril (PRINIVIL/ZESTRIL) 20 MG tablet Take 1 tablet (20 mg) by mouth 2 times daily 180 tablet 3     aspirin 81 MG tablet Take 81 mg by mouth daily       [DISCONTINUED] amoxicillin-clavulanate (AUGMENTIN) 875-125 MG per tablet Take 1 tablet by mouth 2 times daily (Patient not taking: Reported on 5/22/2017) 20 tablet 0     [DISCONTINUED] finasteride (PROSCAR) 5 MG tablet Take 1 tablet (5 mg) by mouth daily 90 tablet 3     No facility-administered encounter medications on file as of 9/18/2017.       ASSESSMENT AND PLAN:    1.  Persistent atrial fibrillation.  Initially treated with antiarrhythmic therapy and then went on to have atrial fibrillation ablation in 2014 without any recurrence.  He remains on aspirin alone for anticoagulation.   2.  Elevated blood pressure.  Elevated x2 at our visit today.  I have elected to start him on amlodipine 2.5 mg daily.  I have given him the option of following with Primary Care or our office and he has elected to follow with us here.  He can follow up in [place] for blood pressure check there as it is more convenient for him.   3.  History of a cardiomyopathy.  This has resolved.  It was thought perhaps to be secondary to atrial fibrillation with RVR.  He does continue on carvedilol and lisinopril.   4.   Coronary artery disease with previous RCA stenting.  No anginal type symptoms.   5.  Echocardiogram reviewed in clinic today.  Normal left ventricular ejection fraction, although some evidence of wall motion abnormality which appears to be new.  No anginal type symptoms.  I will review this further with Dr. Martinez to see if any further testing is warranted.      Addendum: Spoke with Dr. Martinez who suggests treadmill stress test for further evaluation.  Test ordered.  Patient will be contacted.    Again, thank you for allowing me to participate in the care of your patient.      Sincerely,    Adrianna Amor, NP, APRN CNP

## 2017-09-18 NOTE — PROGRESS NOTES
HISTORY OF PRESENT ILLNESS:  Mr. Beltran is a 61-year-old patient presenting in clinic today for an annual followup visit.  He is a patient of Dr. Martinez.  I am meeting for the first time today.  He has a history of a cardiomyopathy.  He has persistent atrial fibrillation, status post catheter ablation in 2014 without any recurrence since that time.  He has coronary disease and underwent RCA stenting in 2011.  He has been off of anticoagulation and on aspirin 81 mg alone.      In clinic today, Mr. Beltran tells me he has been feeling well.  He is retired but remains very active.  He has no exertional symptoms, denying chest pain, breathlessness, lightheadedness, dizziness, presyncope, syncope or palpitations.  He does have a primary care doctor, but freely admits that he does not follow as often as he should.      He had an echocardiogram done at our office today showing a normal ejection fraction of 50%-55%.  There is mild left ventricular hypertrophy.  Also seen with contrast was a mid distal/high lateral wall hypokinesia, which appeared to be new from 2014.  The remainder of the study was essentially within normal limits without any significant valvular disease.      I see that cholesterol was checked last spring showing a total cholesterol of 174, HDL 30, LDL of 78 and non-HDL of 144.  Triglycerides were elevated at 332.      PHYSICAL EXAMINATION:   GENERAL:  Well-appearing, overweight gentleman in no acute distress.   HEENT:  Normocephalic, atraumatic.   NECK:  Supple without jugular venous distention.   LUNGS:  Clear.   HEART:  S1, S2, regular rate and rhythm, no murmurs or gallops.   ABDOMEN:  Round, but soft.   EXTREMITIES:  Lower extremities free of any edema.      ASSESSMENT AND PLAN:    1.  Persistent atrial fibrillation.  Initially treated with antiarrhythmic therapy and then went on to have atrial fibrillation ablation in 2014 without any recurrence.  He remains on aspirin alone for anticoagulation.   2.   Elevated blood pressure.  Elevated x2 at our visit today.  I have elected to start him on amlodipine 2.5 mg daily.  I have given him the option of following with Primary Care or our office and he has elected to follow with us here.  He can follow up in [place] for blood pressure check there as it is more convenient for him.   3.  History of a cardiomyopathy.  This has resolved.  It was thought perhaps to be secondary to atrial fibrillation with RVR.  He does continue on carvedilol and lisinopril.   4.  Coronary artery disease with previous RCA stenting.  No anginal type symptoms.   5.  Echocardiogram reviewed in clinic today.  Normal left ventricular ejection fraction, although some evidence of wall motion abnormality which appears to be new.  No anginal type symptoms.  I will review this further with Dr. Martinez to see if any further testing is warranted.         PEPE PHELPS, CNP       Addendum: Spoke with Dr. Martinez who suggests treadmill stress test for further evaluation.  Test ordered.  Patient will be contacted.     D: 2017 13:17   T: 2017 16:11   MT: AHMET      Name:     KASEY FRIEDMAN   MRN:      [id number]        Account:      [id number]   :      1955           Service Date: 2017      Document: [id number]

## 2017-10-09 ENCOUNTER — TRANSFERRED RECORDS (OUTPATIENT)
Dept: HEALTH INFORMATION MANAGEMENT | Facility: CLINIC | Age: 62
End: 2017-10-09

## 2017-10-09 ENCOUNTER — TELEPHONE (OUTPATIENT)
Dept: FAMILY MEDICINE | Facility: CLINIC | Age: 62
End: 2017-10-09

## 2017-10-09 DIAGNOSIS — R30.0 DYSURIA: Primary | ICD-10-CM

## 2017-10-09 NOTE — TELEPHONE ENCOUNTER
Reason for call: question  Patient called regarding (reason for call): Patient would like to talk with the nurse about his symptoms of having to urinate often and some burning sensation  Additional comments: please call patient to discuss further      Phone number to reach patient:  Other phone number:  [phone]*    Best Time: anytime    Can we leave a detailed message on this number?  YES

## 2017-10-09 NOTE — TELEPHONE ENCOUNTER
Doron Feldman is a 61 year old male who calls with frequency, rectal pressure, with burning.  Patient states that he is leaking post-urination as well.  Patient currently taking Flomax with no relief.     NURSING ASSESSMENT:  Description:  See above   Onset/duration:  Couple weeks   Precip. factors: History of dysuria   Associated symptoms:  NA  Improves/worsens symptoms:  NA  Pain scale (0-10)   3/10  Last exam/Treatment:  5/22/2017  Allergies:   Allergies   Allergen Reactions     Nka [No Known Allergies]        MEDICATIONS:   Taking medication(s) as prescribed? No  Taking over the counter medication(s?) No  Any medication side effects? No significant side effects    Any barriers to taking medication(s) as prescribed?  No  Medication(s) improving/managing symptoms?  N/A  Medication reconciliation completed: N/A      NURSING PLAN: Huddle with provider, plan includes ua/uc follow up in clinic    RECOMMENDED DISPOSITION:  Home care advice - continue to stay hydrated, ok to leave urine sample, follow up appointment scheduled for this week.    Will comply with recommendation: Yes  If further questions/concerns or if symptoms do not improve, worsen or new symptoms develop, call your PCP or [facility] as soon as possible.      Guideline used:    Telephone Triage Protocols for Nurses, Fourth Edition, Sandy Patino RN

## 2017-10-10 DIAGNOSIS — R97.20 ELEVATED PSA: ICD-10-CM

## 2017-10-10 DIAGNOSIS — R30.0 DYSURIA: ICD-10-CM

## 2017-10-10 LAB
ALBUMIN UR-MCNC: NEGATIVE MG/DL
APPEARANCE UR: CLEAR
BILIRUB UR QL STRIP: NEGATIVE
COLOR UR AUTO: YELLOW
GLUCOSE UR STRIP-MCNC: NEGATIVE MG/DL
HGB UR QL STRIP: NEGATIVE
KETONES UR STRIP-MCNC: NEGATIVE MG/DL
LEUKOCYTE ESTERASE UR QL STRIP: NEGATIVE
NITRATE UR QL: NEGATIVE
PH UR STRIP: 5.5 PH (ref 5–7)
PSA SERPL-MCNC: 7.03 UG/L (ref 0–4)
SOURCE: NORMAL
SP GR UR STRIP: >1.03 (ref 1–1.03)
UROBILINOGEN UR STRIP-ACNC: 0.2 EU/DL (ref 0.2–1)

## 2017-10-10 PROCEDURE — 81003 URINALYSIS AUTO W/O SCOPE: CPT | Performed by: UROLOGY

## 2017-10-10 PROCEDURE — 84153 ASSAY OF PSA TOTAL: CPT | Performed by: UROLOGY

## 2017-10-10 PROCEDURE — 36415 COLL VENOUS BLD VENIPUNCTURE: CPT | Performed by: UROLOGY

## 2017-10-12 ENCOUNTER — OFFICE VISIT (OUTPATIENT)
Dept: UROLOGY | Facility: OTHER | Age: 62
End: 2017-10-12
Payer: COMMERCIAL

## 2017-10-12 VITALS — SYSTOLIC BLOOD PRESSURE: 138 MMHG | DIASTOLIC BLOOD PRESSURE: 72 MMHG | HEART RATE: 68 BPM | RESPIRATION RATE: 12 BRPM

## 2017-10-12 DIAGNOSIS — R97.20 ELEVATED PROSTATE SPECIFIC ANTIGEN (PSA): ICD-10-CM

## 2017-10-12 DIAGNOSIS — R30.0 DYSURIA: ICD-10-CM

## 2017-10-12 DIAGNOSIS — N40.1 BENIGN PROSTATIC HYPERPLASIA WITH LOWER URINARY TRACT SYMPTOMS, SYMPTOM DETAILS UNSPECIFIED: Primary | ICD-10-CM

## 2017-10-12 PROCEDURE — 99214 OFFICE O/P EST MOD 30 MIN: CPT | Mod: 25 | Performed by: UROLOGY

## 2017-10-12 PROCEDURE — 51798 US URINE CAPACITY MEASURE: CPT | Performed by: UROLOGY

## 2017-10-12 RX ORDER — CIPROFLOXACIN 500 MG/1
500 TABLET, FILM COATED ORAL 2 TIMES DAILY
Qty: 14 TABLET | Refills: 0 | Status: SHIPPED | OUTPATIENT
Start: 2017-10-12 | End: 2017-10-31

## 2017-10-12 RX ORDER — CIPROFLOXACIN 500 MG/1
500 TABLET, FILM COATED ORAL 2 TIMES DAILY
Qty: 14 TABLET | Refills: 0 | Status: SHIPPED | OUTPATIENT
Start: 2017-10-12 | End: 2017-10-12

## 2017-10-12 NOTE — PROGRESS NOTES
Chief Complaint   Patient presents with     RECHECK     frequency, 1-2x per night       Doron Beltran is a 61 year old male who presents today for follow up of   Chief Complaint   Patient presents with     RECHECK     frequency, 1-2x per night   Doron Beltran is a pleasant 61-year-old gentleman who presents to clinic today for consultation with regard to several urological issues.  The patient is a patient of Dr. Gonzales in the past.  He has history of elevated PSA, BPH and erectile dysfunction.  The patient has had 2 previous prostate biopsies for elevated PSA.  The last one was in 2015 by Dr. Gonzales.  He also had an MRI of the prostate that did not show any obvious nodule.  His most recent PSA is 7.  Since he is on finasteride his actual PSA is 14.  He was started also on flomax.  His AUA is 17 with QOL of 4.  For the last several weeks he has some dysuria but recent UA was negative.    Current Outpatient Prescriptions   Medication Sig Dispense Refill     ciprofloxacin (CIPRO) 500 MG tablet Take 1 tablet (500 mg) by mouth 2 times daily Start day of procedure 14 tablet 0     amLODIPine (NORVASC) 2.5 MG tablet Take 1 tablet (2.5 mg) by mouth daily 90 tablet 3     finasteride (PROSCAR) 5 MG tablet Take 1 tablet (5 mg) by mouth daily 90 tablet 3     tamsulosin (FLOMAX) 0.4 MG capsule Take 1 capsule (0.4 mg) by mouth At Bedtime 90 capsule 1     sildenafil (VIAGRA) 100 MG cap/tab Take 1 tablet (100 mg) by mouth daily as needed for erectile dysfunction 60 tablet 3     carvedilol (COREG) 25 MG tablet Take 1 tablet (25 mg) by mouth 2 times daily (with meals) 180 tablet 3     NEW MED CO-Q 10 taking daily.       pravastatin (PRAVACHOL) 40 MG tablet Take 1 tablet (40 mg) by mouth daily 90 tablet 3     lisinopril (PRINIVIL/ZESTRIL) 20 MG tablet Take 1 tablet (20 mg) by mouth 2 times daily 180 tablet 3     aspirin 81 MG tablet Take 81 mg by mouth daily       Allergies   Allergen Reactions     Nka [No Known Allergies]       Past  Medical History:   Diagnosis Date     Atrial fibrillation (H) 9/23/10     CARDIAC DYSRHYTHMIAS NEC 4/3/2008     Cardiomyopathy, idiopathic (H)      Coronary artery disease     Stent     Elevated PSA 10/7/2010     HTN (hypertension) 4/25/2011     Hyperlipidemia      Hypertension      TYLER (obstructive sleep apnea)     CPAP     Shortness of breath      Past Surgical History:   Procedure Laterality Date     CARDIAC CATHERIZATION  2011    mid RCA stenosis of 85-90%-BMS     H ABLATION FOCAL AFIB  3/27/14     ORTHOPEDIC SURGERY      knee- both arthrocsopic     ORTHOPEDIC SURGERY      left pinky finger     REMOVE FOREIGN BODY FINGER Left 2/7/2017    Procedure: REMOVE FOREIGN BODY FINGER;  Surgeon: Adilene Mcdonald MD;  Location: [facility] OR     SURGICAL HISTORY OF -       right knee arthoscopic     SURGICAL HISTORY OF -       left 5th finger surgery     Family History   Problem Relation Age of Onset     DIABETES Father      CEREBROVASCULAR DISEASE Father      Unknown/Adopted Maternal Grandfather      Unknown/Adopted Paternal Grandmother      CANCER Paternal Grandfather      unknown     Cardiovascular Mother      had pacemaker placed unknown reason why     Social History     Social History     Marital status:      Spouse name: N/A     Number of children: N/A     Years of education: N/A     Social History Main Topics     Smoking status: Never Smoker     Smokeless tobacco: Never Used     Alcohol use Yes      Comment: 2-3 drinks per week     Drug use: No     Sexual activity: Yes     Partners: Female     Other Topics Concern     Parent/Sibling W/ Cabg, Mi Or Angioplasty Before 65f 55m? No     Caffeine Concern Yes     2 cups caffeine per day     Sleep Concern Yes     sleep apnea, wears cpap at night     Stress Concern No     Weight Concern No     Special Diet No     Back Care No     Exercise Yes     knee exercises, weights     Social History Narrative       REVIEW OF SYSTEMS  =================  C: NEGATIVE for fever,  chills, change in weight  I: NEGATIVE for worrisome rashes, moles or lesions  E/M: NEGATIVE for ear, mouth and throat problems  R: NEGATIVE for significant cough or SHORTNESS OF BREATH,   CV: NEGATIVE for chest pain, palpitations or peripheral edema  GI: NEGATIVE for nausea, abdominal pain, heartburn, or change in bowel habits  NEURO: NEGATIVE any motor/sensory changes  PSYCH: NEGATIVE for recent mood disorder    Physical Exam:  /72 (BP Location: Right arm, Patient Position: Chair, Cuff Size: Adult Regular)  Pulse 68  Resp 12   Patient is pleasant, in no acute distress, good general condition.  Lung: no evidence of respiratory distress    Abdomen: Soft, nondistended, non tender. No masses. No rebound or guarding.   Exam: penis no d/c. Testis no masses.  No scrotal skin lesion. Prostate apex only.   Skin: Warm and dry.  No redness.  Psych: normal mood and affect  Neuro: alert and oriented    Assessment/Plan:   (N40.1) Benign prostatic hyperplasia with lower urinary tract symptoms, symptom details unspecified  (primary encounter diagnosis)  Comment: bladder scan 65 ml  Plan: cont with flomax/proscar    (R97.20) Elevated prostate specific antigen (PSA)  Comment: stable  Plan: PSA, tumor marker        In six months    (R30.0) Dysuria  Comment:    Plan: ? Prostatitis           cipro 500 mg po bid for 7 days.

## 2017-10-12 NOTE — PROGRESS NOTES
Bladder Scan performed. 65 mL maximum residual urine detected after 3 scans. MD informed.    Demetra Becerra RN

## 2017-10-12 NOTE — NURSING NOTE
"Chief Complaint   Patient presents with     RECHECK     frequency, 1-2x per night       Initial /72 (BP Location: Right arm, Patient Position: Chair, Cuff Size: Adult Regular)  Pulse 68  Resp 12 Estimated body mass index is 33.19 kg/(m^2) as calculated from the following:    Height as of 9/18/17: 5' 11\" (1.803 m).    Weight as of 9/18/17: 238 lb (108 kg).  Medication Reconciliation: complete     Demetra Becerra RN      "

## 2017-10-12 NOTE — MR AVS SNAPSHOT
After Visit Summary   10/12/2017    Doron Feldman    MRN: [id number]           Patient Information     Date Of Birth          1955        Visit Information        Provider Department      10/12/2017 10:45 AM César Begum MD [facility]        Today's Diagnoses     Benign prostatic hyperplasia with lower urinary tract symptoms, symptom details unspecified    -  1    Elevated prostate specific antigen (PSA)        Dysuria           Follow-ups after your visit        Your next 10 appointments already scheduled     Oct 31, 2017  9:30 AM CDT   Return Visit with Moriah Bruce PA-C   [facility] PHYSICIAN HEART AT [facility] ([facility])    [address] [address]   [phone]            Nov 27, 2017  9:00 AM CST   [facility]    [address] [place] [address]   [phone]              Future tests that were ordered for you today     Open Future Orders        Priority Expected Expires Ordered    PSA, tumor marker Routine 4/12/2018 10/12/2018 10/12/2017            Who to contact     If you have questions or need follow up information about today's clinic visit or your schedule please contact [facility] directly at [phone].  Normal or non-critical lab and imaging results will be communicated to you by MyChart, letter or phone within 4 business days after the clinic has received the results. If you do not hear from us within 7 days, please contact the clinic through MyChart or phone. If you have a critical or abnormal lab result, we will notify you by phone as soon as possible.  Submit refill requests through [employer] or call your pharmacy and they will forward the refill request to us. Please allow 3 business days for your refill to be completed.          Additional Information About Your Visit        [facility]  "Information     [employer] lets you send messages to your doctor, view your test results, renew your prescriptions, schedule appointments and more. To sign up, go to [email or web address] . Click on \"Log in\" on the left side of the screen, which will take you to the Welcome page. Then click on \"Sign up Now\" on the right side of the page.     You will be asked to enter the access code listed below, as well as some personal information. Please follow the directions to create your username and password.     Your access code is: [email or web address]  Expires: 2017 12:52 PM     Your access code will  in 90 days. If you need help or a new code, please call your [place] clinic or [phone].        Care EveryWhere ID     This is your Care EveryWhere ID. This could be used by other organizations to access your [place] medical records  [id number]        Your Vitals Were     Pulse Respirations                68 12           Blood Pressure from Last 3 Encounters:   10/12/17 138/72   17 139/78   17 136/84    Weight from Last 3 Encounters:   17 238 lb (108 kg)   17 225 lb (102.1 kg)   17 230 lb (104.3 kg)              We Performed the Following     MEASURE POST-VOID RESIDUAL URINE/BLADDER CAPACITY, US NON-IMAGING          Today's Medication Changes          These changes are accurate as of: 10/12/17 11:16 AM.  If you have any questions, ask your nurse or doctor.               Start taking these medicines.        Dose/Directions    ciprofloxacin 500 MG tablet   Commonly known as:  CIPRO   Used for:  Dysuria   Started by:  César Begum MD        Dose:  500 mg   Take 1 tablet (500 mg) by mouth 2 times daily Start day of procedure   Quantity:  14 tablet   Refills:  0            Where to get your medicines      These medications were sent to [facility] [place], [place] - [address]  [address] [address]     Phone:  [phone]     ciprofloxacin 500 MG " tablet                Primary Care Provider Office Phone # Fax #    Fahad Fisher [phone] [address] [address]        Equal Access to Services     AC GUAJARDO : Hadii aad ku hadaleks Morley, waaxda luqadaha, qaybta kaalmada constance, william aguirre marykyrie ya gadiel velarde. So [facility] [phone].    ATENCIÓN: Si habla español, tiene a snell disposición servicios gratuitos de asistencia lingüística. Llame al [phone].    We comply with applicable federal civil rights laws and [place] laws. We do not discriminate on the basis of race, color, national origin, age, disability, sex, sexual orientation, or gender identity.            Thank you!     Thank you for choosing [facility]  for your care. Our goal is always to provide you with excellent care. Hearing back from our patients is one way we can continue to improve our services. Please take a few minutes to complete the written survey that you may receive in the mail after your visit with us. Thank you!             Your Updated Medication List - Protect others around you: Learn how to safely use, store and throw away your medicines at www.disposemymeds.org.          This list is accurate as of: 10/12/17 11:16 AM.  Always use your most recent med list.                   Brand Name Dispense Instructions for use Diagnosis    amLODIPine 2.5 MG tablet    NORVASC    90 tablet    Take 1 tablet (2.5 mg) by mouth daily    Persistent atrial fibrillation (H)       aspirin 81 MG tablet      Take 81 mg by mouth daily        carvedilol 25 MG tablet    COREG    180 tablet    Take 1 tablet (25 mg) by mouth 2 times daily (with meals)    Cardiomyopathy, unspecified       ciprofloxacin 500 MG tablet    CIPRO    14 tablet    Take 1 tablet (500 mg) by mouth 2 times daily Start day of procedure    Dysuria       finasteride 5 MG tablet    PROSCAR    90 tablet    Take 1 tablet (5 mg) by mouth daily    Elevated PSA        lisinopril 20 MG tablet    PRINIVIL/ZESTRIL    180 tablet    Take 1 tablet (20 mg) by mouth 2 times daily    HTN (hypertension)       NEW MED      CO-Q 10 taking daily.    Preop general physical exam       pravastatin 40 MG tablet    PRAVACHOL    90 tablet    Take 1 tablet (40 mg) by mouth daily    New onset atrial fibrillation (H)       sildenafil 100 MG tablet    VIAGRA    60 tablet    Take 1 tablet (100 mg) by mouth daily as needed for erectile dysfunction    Erectile dysfunction, unspecified erectile dysfunction type       tamsulosin 0.4 MG capsule    FLOMAX    90 capsule    Take 1 capsule (0.4 mg) by mouth At Bedtime    BPH (benign prostatic hypertrophy) with urinary obstruction

## 2017-10-31 ENCOUNTER — OFFICE VISIT (OUTPATIENT)
Dept: CARDIOLOGY | Facility: CLINIC | Age: 62
End: 2017-10-31
Attending: NURSE PRACTITIONER
Payer: COMMERCIAL

## 2017-10-31 VITALS
SYSTOLIC BLOOD PRESSURE: 134 MMHG | HEART RATE: 56 BPM | DIASTOLIC BLOOD PRESSURE: 72 MMHG | BODY MASS INDEX: 32.36 KG/M2 | OXYGEN SATURATION: 97 % | WEIGHT: 232 LBS

## 2017-10-31 DIAGNOSIS — I48.19 PERSISTENT ATRIAL FIBRILLATION (H): ICD-10-CM

## 2017-10-31 DIAGNOSIS — R93.1 ABNORMAL ECHOCARDIOGRAM: Primary | ICD-10-CM

## 2017-10-31 PROCEDURE — 99214 OFFICE O/P EST MOD 30 MIN: CPT | Performed by: PHYSICIAN ASSISTANT

## 2017-10-31 RX ORDER — AMLODIPINE BESYLATE 2.5 MG/1
2.5 TABLET ORAL 2 TIMES DAILY
Qty: 180 TABLET | Refills: 3 | Status: SHIPPED | OUTPATIENT
Start: 2017-10-31 | End: 2018-12-13

## 2017-10-31 NOTE — LETTER
10/31/2017    Fahad Fisher MD  [address] [address]    RE: Doron Feldman       Dear Colleague,    I had the pleasure of seeing Doron Feldman in the [facility].    HPI:   I had the pleasure of seeing Bhavik today when he came for follow-up of his recent medication changes. He is a pleasant 61-year-old with a history of coronary disease status post bare mental stent implantation to the RCA in 2011, paroxysmal atrial fibrillation first diagnosed in 2009, with trials of sotalol and Multaq not being successful in maintaining sinus rhythm. He underwent pulmonary vein isolation 3/2014 and has had no issues since. Warfarin was discontinued in 2015 due to Bhavik's preference. He has hypertension, and saw Adrianna in 9/2017. At that time, she started amlodipine 2.5 mg daily. She also reviewed an echocardiogram showing a new wall motion abnormality (mild distal lateral/high lateral hypokinesis). Ejection fraction was stable at 50-55%. An addendum to her documentation indicates that she spoke with Dr. Martinez about this wall motion abnormality and he recommended treadmill stress test, but it appears this was not set up yet. It was recommended he see me in follow-up to review the blood pressure with the addition of amlodipine.     Bhavik has had no issues since starting the amlodipine. Specifically, he denies dizziness, lightheadedness or edema. He has not been checking his blood pressure at home, but overall feels like he is doing well on this. He continues to deny chest pain, pressure or tightness. He denies shortness of breath, lightheadedness or dizziness. He remains compliant with CPAP therapy. He does not think he's had recurrent atrial fibrillation.    Echo 9/2017 showed EF 55% with mild distal lateral/high lateral hypokinesis. Grade 3 diastolic dysfunction was noted by E/A ratio, but left atrium was not significantly enlarged and the filling pressure was not high. Therefore,  it was felt that this may have been normal given that he is quite fit.       Assessment & Plan:    1. Persistent Atrial Fibrillation - He remains in sinus rhythm following ablation 2014. He was quite symptomatically with atrial fibrillation in the past, and has not had any issues with this. At this time, he will continue to monitor for recurrent atrial fibrillation. He will remain on carvedilol and aspirin.     2. HTN - Blood pressure was better with the addition of amlodipine, but I encouraged him to increase this to 2.5 mg twice daily to get blood pressures more firmly into the 120s. We are hopeful that this helps decrease the risk of recurrent atrial fibrillation. A 90 day supply was sent to [facility] per his request.     3. Abnormal echocardiogram and h/o CAD - Dr. Martinez recommended a stress test to evaluate this wall motion abnormality given his history of coronary disease (bare metal stent to the RCA in 2011). We will set him up for a treadmill Cardiolite stress test on medications to check for ischemia on his current regimen. We will be in touch with him by phone with the result.    At this time, he will see Dr. Martinez in one year, but will contact us in the interim can we be of assistance. If his stress test is remarkably abnormal, he may require additional follow-up/testing.       Geneva Bruce PA-C, MSPAS      Orders Placed This Encounter   Procedures     NM Exercise stress test (nuc card)     Follow-Up with Electrophysiologist     Orders Placed This Encounter   Medications     amLODIPine (NORVASC) 2.5 MG tablet     Sig: Take 1 tablet (2.5 mg) by mouth 2 times daily     Dispense:  180 tablet     Refill:  3     Replaces daily dose of amlodipine     Medications Discontinued During This Encounter   Medication Reason     ciprofloxacin (CIPRO) 500 MG tablet Therapy completed     amLODIPine (NORVASC) 2.5 MG tablet Reorder         Encounter Diagnoses   Name Primary?     Persistent atrial fibrillation (H)       Abnormal echocardiogram Yes       CURRENT MEDICATIONS:  Current Outpatient Prescriptions   Medication Sig Dispense Refill     amLODIPine (NORVASC) 2.5 MG tablet Take 1 tablet (2.5 mg) by mouth 2 times daily 180 tablet 3     finasteride (PROSCAR) 5 MG tablet Take 1 tablet (5 mg) by mouth daily 90 tablet 3     tamsulosin (FLOMAX) 0.4 MG capsule Take 1 capsule (0.4 mg) by mouth At Bedtime 90 capsule 1     sildenafil (VIAGRA) 100 MG cap/tab Take 1 tablet (100 mg) by mouth daily as needed for erectile dysfunction 60 tablet 3     carvedilol (COREG) 25 MG tablet Take 1 tablet (25 mg) by mouth 2 times daily (with meals) 180 tablet 3     NEW MED CO-Q 10 taking daily.       pravastatin (PRAVACHOL) 40 MG tablet Take 1 tablet (40 mg) by mouth daily 90 tablet 3     lisinopril (PRINIVIL/ZESTRIL) 20 MG tablet Take 1 tablet (20 mg) by mouth 2 times daily 180 tablet 3     aspirin 81 MG tablet Take 81 mg by mouth daily       [DISCONTINUED] amLODIPine (NORVASC) 2.5 MG tablet Take 1 tablet (2.5 mg) by mouth daily 90 tablet 3       ALLERGIES     Allergies   Allergen Reactions     Nka [No Known Allergies]        PAST MEDICAL HISTORY:  Past Medical History:   Diagnosis Date     Atrial fibrillation (H) 9/23/10     CARDIAC DYSRHYTHMIAS NEC 4/3/2008     Cardiomyopathy, idiopathic (H)      Coronary artery disease     Stent     Elevated PSA 10/7/2010     HTN (hypertension) 4/25/2011     Hyperlipidemia      Hypertension      TYLER (obstructive sleep apnea)     CPAP     Shortness of breath        PAST SURGICAL HISTORY:  Past Surgical History:   Procedure Laterality Date     CARDIAC CATHERIZATION  2011    mid RCA stenosis of 85-90%-BMS     H ABLATION FOCAL AFIB  3/27/14     ORTHOPEDIC SURGERY      knee- both arthrocsopic     ORTHOPEDIC SURGERY      left pinky finger     REMOVE FOREIGN BODY FINGER Left 2/7/2017    Procedure: REMOVE FOREIGN BODY FINGER;  Surgeon: Adilene Mcdonald MD;  Location: [facility] OR     SURGICAL HISTORY OF -       right knee  arthoscopic     SURGICAL HISTORY OF -       left 5th finger surgery       FAMILY HISTORY:  Family History   Problem Relation Age of Onset     DIABETES Father      CEREBROVASCULAR DISEASE Father      Unknown/Adopted Maternal Grandfather      Unknown/Adopted Paternal Grandmother      CANCER Paternal Grandfather      unknown     Cardiovascular Mother      had pacemaker placed unknown reason why       SOCIAL HISTORY:  Social History     Social History     Marital status:      Spouse name: N/A     Number of children: N/A     Years of education: N/A     Social History Main Topics     Smoking status: Never Smoker     Smokeless tobacco: Never Used     Alcohol use Yes      Comment: 2-3 drinks per week     Drug use: No     Sexual activity: Yes     Partners: Female     Other Topics Concern     Parent/Sibling W/ Cabg, Mi Or Angioplasty Before 65f 55m? No     Caffeine Concern Yes     2 cups caffeine per day     Sleep Concern Yes     sleep apnea, wears cpap at night     Stress Concern No     Weight Concern No     Special Diet No     Back Care No     Exercise Yes     knee exercises, weights     Social History Narrative       Review of Systems:  Skin:  Negative     Eyes:  Positive for    ENT:  Negative    Respiratory:  Positive for sleep apnea;CPAP  Cardiovascular:  Negative for;palpitations;chest pain;dizziness;lightheadedness;fatigue    Gastroenterology: Negative    Genitourinary:  Negative    Musculoskeletal:  Positive for joint pain  Neurologic:  Negative    Psychiatric:  Negative    Heme/Lymph/Imm:  Negative    Endocrine:  Negative      Physical Exam:  Vitals: /72 (BP Location: Right arm, Patient Position: Sitting, Cuff Size: Adult Regular)  Pulse 56  Wt 105.2 kg (232 lb)  SpO2 97%  BMI 32.36 kg/m2    Constitutional:  cooperative, alert and oriented, well developed, well nourished, in no acute distress overweight      Skin:  warm and dry to the touch, no apparent skin lesions or masses noted (diffuse erhythema  )        Head:  normocephalic, no masses or lesions        Eyes:  pupils equal and round;conjunctivae and lids unremarkable;sclera white;no xanthalasma        ENT:  no pallor or cyanosis, dentition good        Neck:  JVP normal;no carotid bruit;no thyromegaly        Chest:  normal breath sounds, clear to auscultation, normal A-P diameter, normal symmetry, normal respiratory excursion, no use of accessory muscles        Cardiac: regular rhythm;no murmurs, gallops or rubs detected                  Abdomen:  abdomen soft        Vascular: pulses full and equal                                      Extremities and Back:  no deformities, clubbing, cyanosis, erythema observed;no edema        Neurological:  affect appropriate, oriented to time, person and place          Recent Lab Results:  LIPID RESULTS:  Lab Results   Component Value Date    CHOL 197 09/18/2017    HDL 38 (L) 09/18/2017     (H) 09/18/2017    TRIG 167 (H) 09/18/2017    CHOLHDLRATIO 6.2 (H) 03/27/2015       LIVER ENZYME RESULTS:  Lab Results   Component Value Date    AST 35 09/24/2010    ALT 25 12/02/2011       CBC RESULTS:  Lab Results   Component Value Date    WBC 6.4 02/03/2017    RBC 4.79 02/03/2017    HGB 15.2 02/03/2017    HCT 45.5 02/03/2017    MCV 95 02/03/2017    MCH 31.7 02/03/2017    MCHC 33.4 02/03/2017    RDW 12.3 02/03/2017     02/03/2017       BMP RESULTS:  Lab Results   Component Value Date     09/18/2017    POTASSIUM 3.9 09/18/2017    CHLORIDE 105 09/18/2017    CO2 24 09/18/2017    ANIONGAP 12.9 09/18/2017     (H) 09/18/2017    BUN 26 09/18/2017    CR 0.86 09/18/2017    GFRESTIMATED >90 09/18/2017    GFRESTBLACK >90 09/18/2017    ELROY 9.3 09/18/2017        A1C RESULTS:  No results found for: A1C    INR RESULTS:  Lab Results   Component Value Date    INR 2.02 (H) 03/27/2015    INR 2.3 (A) 11/26/2014    INR 2.8 (A) 10/10/2014    INR 1.92 (H) 03/28/2014     Thank you for allowing me to participate in the care of your  patient.    Sincerely,     Moriah Bruce PA-C     [facility]

## 2017-10-31 NOTE — PATIENT INSTRUCTIONS
Thank you for your [facility] visit today with Geneva Bruce PA-C    Medication Changes:  INCREASE amlodipine from 2.5 daily to 2.5 mg TWICE daily to keep BP down a bit more    Recommendations:  Continue good exercise/diet routine  Get treadmill stress test ON medications (coreg) to help evaluate the sluggish area on your echocardiogram. We will call with results.  My nurses are Korina/Echo: [phone]    Follow-up:  1. See Dr. Martinez in 1 year but CALL if need to be seen sooner. We'll send letter when it's time to schedule.    See rdiology scheduling at [phone] three months prior to requested revisit date to schedule future cardiology appointments.    Reminder:  1. Please bring in your current medication list or your medication, over the counter supplements and vitamin bottles as we will review these at each office visit.                 [facility]  [facility]~[address]~[place], [place]~[address]  Questions about your visit today?  Call your Cardiology Clinic RN's-Korina Ruiz and/or Echo Kurtz at [phone].

## 2017-10-31 NOTE — MR AVS SNAPSHOT
After Visit Summary   10/31/2017    Doron Feldman    MRN: [id number]           Patient Information     Date Of Birth          1955        Visit Information        Provider Department      10/31/2017 9:30 AM Moriah Bruce PA-C [facility]        Today's Diagnoses     Abnormal echocardiogram    -  1    Persistent atrial fibrillation (H)          Care Instructions      Thank you for your  Heart Care visit today with Geneva Bruce PA-C    Medication Changes:  INCREASE amlodipine from 2.5 daily to 2.5 mg TWICE daily to keep BP down a bit more    Recommendations:  Continue good exercise/diet routine  Get treadmill stress test ON medications (coreg) to help evaluate the sluggish area on your echocardiogram. We will call with results.  My nurses are Korina/Echo: [phone]    Follow-up:  1. See Dr. Martinez in 1 year but CALL if need to be seen sooner. We'll send letter when it's time to schedule.    See rdiology scheduling at [phone] three months prior to requested revisit date to schedule future cardiology appointments.    Reminder:  1. Please bring in your current medication list or your medication, over the counter supplements and vitamin bottles as we will review these at each office visit.                 [facility]~[address]~[place], [place]~[address]  Questions about your visit today?  Call your Cardiology Clinic RN's-Korina Ruiz and/or Echo Kurtz at [phone].                Follow-ups after your visit        Additional Services     Follow-Up with Electrophysiologist                 Your next 10 appointments already scheduled     Nov 27, 2017  9:00 AM CST   Return Visit with César Begum MD   [facility] [place] ([facility] [place])    [address]  [place] [id number] [phone]              Future tests that were ordered for you today   "   Open Future Orders        Priority Expected Expires Ordered    Follow-Up with Electrophysiologist Routine 10/31/2018 2018 10/31/2017    NM Exercise stress test (nuc card) Routine 2017 10/31/2018 10/31/2017            Who to contact     If you have questions or need follow up information about today's clinic visit or your schedule please contact [facility] directly at [phone].  Normal or non-critical lab and imaging results will be communicated to you by [employer], letter or phone within 4 business days after the clinic has received the results. If you do not hear from us within 7 days, please contact the clinic through [employer] or phone. If you have a critical or abnormal lab result, we will notify you by phone as soon as possible.  Submit refill requests through [employer] or call your pharmacy and they will forward the refill request to us. Please allow 3 business days for your refill to be completed.          Additional Information About Your Visit        [employer] Information     [employer] lets you send messages to your doctor, view your test results, renew your prescriptions, schedule appointments and more. To sign up, go to [email or web address] . Click on \"Log in\" on the left side of the screen, which will take you to the Welcome page. Then click on \"Sign up Now\" on the right side of the page.     You will be asked to enter the access code listed below, as well as some personal information. Please follow the directions to create your username and password.     Your access code is: [email or web address]  Expires: 2017 12:52 PM     Your access code will  in 90 days. If you need help or a new code, please call your [place] clinic or [phone].        Care EveryWhere ID     This is your Care EveryWhere ID. This could be used by other organizations to access your [place] medical records  [id number]        Your Vitals Were     Pulse Pulse Oximetry BMI " (Body Mass Index)             56 97% 32.36 kg/m2          Blood Pressure from Last 3 Encounters:   10/31/17 134/72   10/12/17 138/72   09/18/17 139/78    Weight from Last 3 Encounters:   10/31/17 105.2 kg (232 lb)   09/18/17 108 kg (238 lb)   02/07/17 102.1 kg (225 lb)              We Performed the Following     Follow-Up with Cardiac Advanced Practice Provider          Today's Medication Changes          These changes are accurate as of: 10/31/17  9:47 AM.  If you have any questions, ask your nurse or doctor.               These medicines have changed or have updated prescriptions.        Dose/Directions    amLODIPine 2.5 MG tablet   Commonly known as:  NORVASC   This may have changed:  when to take this   Used for:  Persistent atrial fibrillation (H)   Changed by:  Moriah Bruce PA-C        Dose:  2.5 mg   Take 1 tablet (2.5 mg) by mouth 2 times daily   Quantity:  180 tablet   Refills:  3            Where to get your medicines      These medications were sent to [facility] - [place], [place] - [address] [place] [address]  [address] [place] [address], [facility] [address]     Phone:  [phone]     amLODIPine 2.5 MG tablet                Primary Care Provider Office Phone # Fax #    Fahad Fisher [phone] [address] [address]        Equal Access to Services     AC GUAJARDO AH: Hadii aad ku hadasho Soomaali, waaxda luqadaha, qaybta kaalmada adeegyada, waxay neelam hayaan miya ramesh . So [facility] [phone].    ATENCIÓN: Si habla español, tiene a snell disposición servicios gratuitos de asistencia lingüística. Arnold al [phone].    We comply with applicable federal civil rights laws and [place] laws. We do not discriminate on the basis of race, color, national origin, age, disability, sex, sexual orientation, or gender identity.            Thank you!     Thank you for choosing [facility]  for  your care. Our goal is always to provide you with excellent care. Hearing back from our patients is one way we can continue to improve our services. Please take a few minutes to complete the written survey that you may receive in the mail after your visit with us. Thank you!             Your Updated Medication List - Protect others around you: Learn how to safely use, store and throw away your medicines at www.disposemymeds.org.          This list is accurate as of: 10/31/17  9:47 AM.  Always use your most recent med list.                   Brand Name Dispense Instructions for use Diagnosis    amLODIPine 2.5 MG tablet    NORVASC    180 tablet    Take 1 tablet (2.5 mg) by mouth 2 times daily    Persistent atrial fibrillation (H)       aspirin 81 MG tablet      Take 81 mg by mouth daily        carvedilol 25 MG tablet    COREG    180 tablet    Take 1 tablet (25 mg) by mouth 2 times daily (with meals)    Cardiomyopathy, unspecified       finasteride 5 MG tablet    PROSCAR    90 tablet    Take 1 tablet (5 mg) by mouth daily    Elevated PSA       lisinopril 20 MG tablet    PRINIVIL/ZESTRIL    180 tablet    Take 1 tablet (20 mg) by mouth 2 times daily    HTN (hypertension)       NEW MED      CO-Q 10 taking daily.    Preop general physical exam       pravastatin 40 MG tablet    PRAVACHOL    90 tablet    Take 1 tablet (40 mg) by mouth daily    New onset atrial fibrillation (H)       sildenafil 100 MG tablet    VIAGRA    60 tablet    Take 1 tablet (100 mg) by mouth daily as needed for erectile dysfunction    Erectile dysfunction, unspecified erectile dysfunction type       tamsulosin 0.4 MG capsule    FLOMAX    90 capsule    Take 1 capsule (0.4 mg) by mouth At Bedtime    BPH (benign prostatic hypertrophy) with urinary obstruction

## 2017-10-31 NOTE — PROGRESS NOTES
HPI:   I had the pleasure of seeing Bhavik today when he came for follow-up of his recent medication changes. He is a pleasant 61-year-old with a history of coronary disease status post bare mental stent implantation to the RCA in 2011, paroxysmal atrial fibrillation first diagnosed in 2009, with trials of sotalol and Multaq not being successful in maintaining sinus rhythm. He underwent pulmonary vein isolation 3/2014 and has had no issues since. Warfarin was discontinued in 2015 due to Bhavik's preference. He has hypertension, and saw Adrianna in 9/2017. At that time, she started amlodipine 2.5 mg daily. She also reviewed an echocardiogram showing a new wall motion abnormality (mild distal lateral/high lateral hypokinesis). Ejection fraction was stable at 50-55%. An addendum to her documentation indicates that she spoke with Dr. Martinez about this wall motion abnormality and he recommended treadmill stress test, but it appears this was not set up yet. It was recommended he see me in follow-up to review the blood pressure with the addition of amlodipine.     Bhavik has had no issues since starting the amlodipine. Specifically, he denies dizziness, lightheadedness or edema. He has not been checking his blood pressure at home, but overall feels like he is doing well on this. He continues to deny chest pain, pressure or tightness. He denies shortness of breath, lightheadedness or dizziness. He remains compliant with CPAP therapy. He does not think he's had recurrent atrial fibrillation.    Echo 9/2017 showed EF 55% with mild distal lateral/high lateral hypokinesis. Grade 3 diastolic dysfunction was noted by E/A ratio, but left atrium was not significantly enlarged and the filling pressure was not high. Therefore, it was felt that this may have been normal given that he is quite fit.       Assessment & Plan:    1. Persistent Atrial Fibrillation - He remains in sinus rhythm following ablation 2014. He was quite symptomatically with  atrial fibrillation in the past, and has not had any issues with this. At this time, he will continue to monitor for recurrent atrial fibrillation. He will remain on carvedilol and aspirin.     2. HTN - Blood pressure was better with the addition of amlodipine, but I encouraged him to increase this to 2.5 mg twice daily to get blood pressures more firmly into the 120s. We are hopeful that this helps decrease the risk of recurrent atrial fibrillation. A 90 day supply was sent to [facility] per his request.     3. Abnormal echocardiogram and h/o CAD - Dr. Martinez recommended a stress test to evaluate this wall motion abnormality given his history of coronary disease (bare metal stent to the RCA in 2011). We will set him up for a treadmill Cardiolite stress test on medications to check for ischemia on his current regimen. We will be in touch with him by phone with the result.    At this time, he will see Dr. Martinez in one year, but will contact us in the interim can we be of assistance. If his stress test is remarkably abnormal, he may require additional follow-up/testing.       Geneva Bruce PA-C, MSPAS      Orders Placed This Encounter   Procedures     NM Exercise stress test (nuc card)     Follow-Up with Electrophysiologist     Orders Placed This Encounter   Medications     amLODIPine (NORVASC) 2.5 MG tablet     Sig: Take 1 tablet (2.5 mg) by mouth 2 times daily     Dispense:  180 tablet     Refill:  3     Replaces daily dose of amlodipine     Medications Discontinued During This Encounter   Medication Reason     ciprofloxacin (CIPRO) 500 MG tablet Therapy completed     amLODIPine (NORVASC) 2.5 MG tablet Reorder         Encounter Diagnoses   Name Primary?     Persistent atrial fibrillation (H)      Abnormal echocardiogram Yes       CURRENT MEDICATIONS:  Current Outpatient Prescriptions   Medication Sig Dispense Refill     amLODIPine (NORVASC) 2.5 MG tablet Take 1 tablet (2.5 mg) by mouth 2 times daily 180 tablet 3      finasteride (PROSCAR) 5 MG tablet Take 1 tablet (5 mg) by mouth daily 90 tablet 3     tamsulosin (FLOMAX) 0.4 MG capsule Take 1 capsule (0.4 mg) by mouth At Bedtime 90 capsule 1     sildenafil (VIAGRA) 100 MG cap/tab Take 1 tablet (100 mg) by mouth daily as needed for erectile dysfunction 60 tablet 3     carvedilol (COREG) 25 MG tablet Take 1 tablet (25 mg) by mouth 2 times daily (with meals) 180 tablet 3     NEW MED CO-Q 10 taking daily.       pravastatin (PRAVACHOL) 40 MG tablet Take 1 tablet (40 mg) by mouth daily 90 tablet 3     lisinopril (PRINIVIL/ZESTRIL) 20 MG tablet Take 1 tablet (20 mg) by mouth 2 times daily 180 tablet 3     aspirin 81 MG tablet Take 81 mg by mouth daily       [DISCONTINUED] amLODIPine (NORVASC) 2.5 MG tablet Take 1 tablet (2.5 mg) by mouth daily 90 tablet 3       ALLERGIES     Allergies   Allergen Reactions     Nka [No Known Allergies]        PAST MEDICAL HISTORY:  Past Medical History:   Diagnosis Date     Atrial fibrillation (H) 9/23/10     CARDIAC DYSRHYTHMIAS NEC 4/3/2008     Cardiomyopathy, idiopathic (H)      Coronary artery disease     Stent     Elevated PSA 10/7/2010     HTN (hypertension) 4/25/2011     Hyperlipidemia      Hypertension      TYLER (obstructive sleep apnea)     CPAP     Shortness of breath        PAST SURGICAL HISTORY:  Past Surgical History:   Procedure Laterality Date     CARDIAC CATHERIZATION  2011    mid RCA stenosis of 85-90%-BMS     H ABLATION FOCAL AFIB  3/27/14     ORTHOPEDIC SURGERY      knee- both arthrocsopic     ORTHOPEDIC SURGERY      left pinky finger     REMOVE FOREIGN BODY FINGER Left 2/7/2017    Procedure: REMOVE FOREIGN BODY FINGER;  Surgeon: Adilene Mcdonald MD;  Location: [facility] OR     SURGICAL HISTORY OF -       right knee arthoscopic     SURGICAL HISTORY OF -       left 5th finger surgery       FAMILY HISTORY:  Family History   Problem Relation Age of Onset     DIABETES Father      CEREBROVASCULAR DISEASE Father      Unknown/Adopted  Maternal Grandfather      Unknown/Adopted Paternal Grandmother      CANCER Paternal Grandfather      unknown     Cardiovascular Mother      had pacemaker placed unknown reason why       SOCIAL HISTORY:  Social History     Social History     Marital status:      Spouse name: N/A     Number of children: N/A     Years of education: N/A     Social History Main Topics     Smoking status: Never Smoker     Smokeless tobacco: Never Used     Alcohol use Yes      Comment: 2-3 drinks per week     Drug use: No     Sexual activity: Yes     Partners: Female     Other Topics Concern     Parent/Sibling W/ Cabg, Mi Or Angioplasty Before 65f 55m? No     Caffeine Concern Yes     2 cups caffeine per day     Sleep Concern Yes     sleep apnea, wears cpap at night     Stress Concern No     Weight Concern No     Special Diet No     Back Care No     Exercise Yes     knee exercises, weights     Social History Narrative       Review of Systems:  Skin:  Negative     Eyes:  Positive for    ENT:  Negative    Respiratory:  Positive for sleep apnea;CPAP  Cardiovascular:  Negative for;palpitations;chest pain;dizziness;lightheadedness;fatigue    Gastroenterology: Negative    Genitourinary:  Negative    Musculoskeletal:  Positive for joint pain  Neurologic:  Negative    Psychiatric:  Negative    Heme/Lymph/Imm:  Negative    Endocrine:  Negative      Physical Exam:  Vitals: /72 (BP Location: Right arm, Patient Position: Sitting, Cuff Size: Adult Regular)  Pulse 56  Wt 105.2 kg (232 lb)  SpO2 97%  BMI 32.36 kg/m2    Constitutional:  cooperative, alert and oriented, well developed, well nourished, in no acute distress overweight      Skin:  warm and dry to the touch, no apparent skin lesions or masses noted (diffuse erhythema )        Head:  normocephalic, no masses or lesions        Eyes:  pupils equal and round;conjunctivae and lids unremarkable;sclera white;no xanthalasma        ENT:  no pallor or cyanosis, dentition good         Neck:  JVP normal;no carotid bruit;no thyromegaly        Chest:  normal breath sounds, clear to auscultation, normal A-P diameter, normal symmetry, normal respiratory excursion, no use of accessory muscles        Cardiac: regular rhythm;no murmurs, gallops or rubs detected                  Abdomen:  abdomen soft        Vascular: pulses full and equal                                      Extremities and Back:  no deformities, clubbing, cyanosis, erythema observed;no edema        Neurological:  affect appropriate, oriented to time, person and place          Recent Lab Results:  LIPID RESULTS:  Lab Results   Component Value Date    CHOL 197 09/18/2017    HDL 38 (L) 09/18/2017     (H) 09/18/2017    TRIG 167 (H) 09/18/2017    CHOLHDLRATIO 6.2 (H) 03/27/2015       LIVER ENZYME RESULTS:  Lab Results   Component Value Date    AST 35 09/24/2010    ALT 25 12/02/2011       CBC RESULTS:  Lab Results   Component Value Date    WBC 6.4 02/03/2017    RBC 4.79 02/03/2017    HGB 15.2 02/03/2017    HCT 45.5 02/03/2017    MCV 95 02/03/2017    MCH 31.7 02/03/2017    MCHC 33.4 02/03/2017    RDW 12.3 02/03/2017     02/03/2017       BMP RESULTS:  Lab Results   Component Value Date     09/18/2017    POTASSIUM 3.9 09/18/2017    CHLORIDE 105 09/18/2017    CO2 24 09/18/2017    ANIONGAP 12.9 09/18/2017     (H) 09/18/2017    BUN 26 09/18/2017    CR 0.86 09/18/2017    GFRESTIMATED >90 09/18/2017    GFRESTBLACK >90 09/18/2017    ELROY 9.3 09/18/2017        A1C RESULTS:  No results found for: A1C    INR RESULTS:  Lab Results   Component Value Date    INR 2.02 (H) 03/27/2015    INR 2.3 (A) 11/26/2014    INR 2.8 (A) 10/10/2014    INR 1.92 (H) 03/28/2014

## 2017-11-01 ENCOUNTER — TELEPHONE (OUTPATIENT)
Dept: CARDIOLOGY | Facility: CLINIC | Age: 62
End: 2017-11-01

## 2017-11-01 DIAGNOSIS — E78.5 HYPERLIPIDEMIA: Primary | ICD-10-CM

## 2017-11-01 NOTE — TELEPHONE ENCOUNTER
Pt returned call.  Reviewed lipid results showing:    Component      Latest Ref Rng & Units 3/31/2016 9/18/2017   Cholesterol      <200 mg/dL 174 197   Triglycerides      <150 mg/dL 332 (H) 167 (H)   HDL Cholesterol      >39 mg/dL 30 (L) 38 (L)   LDL Cholesterol Calculated      <100 mg/dL 78 126 (H)   Non HDL Cholesterol      <130 mg/dL 144 (H) 159 (H)     Pt has a hx of CAD, PAF and HTN. He was seen in clinic yesterday with plans for f/u treadmill stress test 11/22 d/t new wall motion abnormalities on echo 9/18.   He continues on Pravastatin 40mg daily.  He states he rarely misses a dose and has not made any changes to his diet.  Previously this was monitor by Dr. Fisher.  Pt states he does not f/u with him often and would like to have this followed through our clinic.  Messaged to [facility] to review and advise.  KMortimer RN

## 2017-11-03 NOTE — TELEPHONE ENCOUNTER
Lipids reviewed.  Suggest he stop Pravachol start Lipitor 40 mg which will be better for secondary prevention and lowering of numbers.  Follow lipids in 6-8 weeks.  Thanks! -KJ

## 2017-11-06 NOTE — TELEPHONE ENCOUNTER
Called pt to review KJelacic's recommendation to stop Pravachol and start lipitor 40 mg.  No answer.  LM for call back.  KMortimer RN

## 2017-11-07 RX ORDER — ATORVASTATIN CALCIUM 40 MG/1
40 TABLET, FILM COATED ORAL DAILY
Qty: 90 TABLET | Refills: 3 | Status: SHIPPED | OUTPATIENT
Start: 2017-11-07 | End: 2018-09-19

## 2017-11-07 NOTE — TELEPHONE ENCOUNTER
Pt returned call.  Reviewed [facility] recommendation to stop pravachol and start atorvastatin 40mg daily with repeat lipid on 6-8 weeks.  Pt expresses understanding.  New script sent and order placed for lipid in 6-8 weeks.  KMortimer RN

## 2017-11-22 ENCOUNTER — HOSPITAL ENCOUNTER (OUTPATIENT)
Dept: NUCLEAR MEDICINE | Facility: CLINIC | Age: 62
Setting detail: NUCLEAR MEDICINE
Discharge: HOME | End: 2017-11-22
Attending: PHYSICIAN ASSISTANT | Admitting: PHYSICIAN ASSISTANT
Payer: COMMERCIAL

## 2017-11-22 DIAGNOSIS — R93.1 ABNORMAL ECHOCARDIOGRAM: ICD-10-CM

## 2017-11-22 PROCEDURE — 93018 CV STRESS TEST I&R ONLY: CPT | Performed by: INTERNAL MEDICINE

## 2017-11-22 PROCEDURE — 93016 CV STRESS TEST SUPVJ ONLY: CPT | Performed by: INTERNAL MEDICINE

## 2017-11-22 PROCEDURE — A9502 TC99M TETROFOSMIN: HCPCS | Performed by: INTERNAL MEDICINE

## 2017-11-22 PROCEDURE — 93017 CV STRESS TEST TRACING ONLY: CPT

## 2017-11-22 PROCEDURE — 34300033 ZZH RX 343: Performed by: INTERNAL MEDICINE

## 2017-11-22 PROCEDURE — 78452 HT MUSCLE IMAGE SPECT MULT: CPT

## 2017-11-22 PROCEDURE — 78452 HT MUSCLE IMAGE SPECT MULT: CPT | Mod: 26 | Performed by: INTERNAL MEDICINE

## 2017-11-22 RX ADMIN — TETROFOSMIN 10.7 MCI.: 1.38 INJECTION, POWDER, LYOPHILIZED, FOR SOLUTION INTRAVENOUS at 07:53

## 2017-11-22 RX ADMIN — TETROFOSMIN 31.6 MCI.: 1.38 INJECTION, POWDER, LYOPHILIZED, FOR SOLUTION INTRAVENOUS at 09:05

## 2017-11-27 ENCOUNTER — DOCUMENTATION ONLY (OUTPATIENT)
Dept: CARDIOLOGY | Facility: CLINIC | Age: 62
End: 2017-11-27

## 2017-11-27 NOTE — PROGRESS NOTES
Dr. Martinez - any changes/further testing?    Pt saw Adrianna and reviewed echo showing new WMA (mild distal lateral/high lateral hypokinesis)with normal EF. He has h/o RCA stent 2011.  Asxc at good work load.    He got stress test up in [place] on medications to check for ischemia on carvedilol 25 BID.    No ischemia (71% predicted HR). A few fixed defects (diaphragm and small fixed anteroseptal vs soft tissue) with normal EF and wall motion.    As he's asx'c, on good CV meds and no ischemia or severe fixed defects noted, I was not planning on doing any other testing. Pls let me know if you'd like anything else!    Geneva

## 2017-12-11 DIAGNOSIS — N13.8 BPH WITH URINARY OBSTRUCTION: Primary | ICD-10-CM

## 2017-12-11 DIAGNOSIS — N40.1 BPH WITH URINARY OBSTRUCTION: Primary | ICD-10-CM

## 2017-12-11 RX ORDER — TAMSULOSIN HYDROCHLORIDE 0.4 MG/1
0.4 CAPSULE ORAL AT BEDTIME
Qty: 90 CAPSULE | Refills: 1 | Status: SHIPPED | OUTPATIENT
Start: 2017-12-11 | End: 2018-07-25

## 2017-12-11 NOTE — LETTER
[address] [address]  [phone]          November 8, 2018    Doron Feldman                                                                                                                     [address] [place] [id number]            Dear Doron,    You are due for your regular follow up with Dr. Begum. Please call the scheduling line to arrange an appointment, [phone]. Please schedule a lab appointment one week prior for your PSA test.  No additional refills will be refilled until follow up is complete.     Sincerely,       [facility]

## 2017-12-12 NOTE — TELEPHONE ENCOUNTER
Prescription approved per [facility] Refill Protocol.  Terrie Pan, RN - BC

## 2017-12-20 DIAGNOSIS — E78.5 HYPERLIPIDEMIA: ICD-10-CM

## 2017-12-20 LAB
CHOLEST SERPL-MCNC: 148 MG/DL
HDLC SERPL-MCNC: 38 MG/DL
LDLC SERPL CALC-MCNC: 74 MG/DL
NONHDLC SERPL-MCNC: 110 MG/DL
TRIGL SERPL-MCNC: 179 MG/DL

## 2017-12-20 PROCEDURE — 80061 LIPID PANEL: CPT | Performed by: NURSE PRACTITIONER

## 2017-12-20 PROCEDURE — 36415 COLL VENOUS BLD VENIPUNCTURE: CPT | Performed by: NURSE PRACTITIONER

## 2017-12-21 ENCOUNTER — OFFICE VISIT (OUTPATIENT)
Dept: FAMILY MEDICINE | Facility: CLINIC | Age: 62
End: 2017-12-21
Payer: COMMERCIAL

## 2017-12-21 VITALS
WEIGHT: 238 LBS | SYSTOLIC BLOOD PRESSURE: 135 MMHG | TEMPERATURE: 97.9 F | DIASTOLIC BLOOD PRESSURE: 82 MMHG | BODY MASS INDEX: 33.32 KG/M2 | HEART RATE: 64 BPM | HEIGHT: 71 IN

## 2017-12-21 DIAGNOSIS — Z71.84 TRAVEL ADVICE ENCOUNTER: Primary | ICD-10-CM

## 2017-12-21 DIAGNOSIS — Z23 NEED FOR VACCINATION: ICD-10-CM

## 2017-12-21 PROCEDURE — 90632 HEPA VACCINE ADULT IM: CPT | Performed by: FAMILY MEDICINE

## 2017-12-21 PROCEDURE — 99213 OFFICE O/P EST LOW 20 MIN: CPT | Mod: 25 | Performed by: FAMILY MEDICINE

## 2017-12-21 PROCEDURE — 90471 IMMUNIZATION ADMIN: CPT | Performed by: FAMILY MEDICINE

## 2017-12-21 NOTE — PROGRESS NOTES
"  SUBJECTIVE:   Doron Feldman is a 62 year old male who presents to clinic today for the following health issues:      TRAVEL:  Here to discuss about injections-medications for travel.  Was recommended that they get the Hepatitis A injection and Typhoid.   They will leave on 1-21-18 for the [facility] [place].          Current Outpatient Prescriptions:      tamsulosin (FLOMAX) 0.4 MG capsule, Take 1 capsule (0.4 mg) by mouth At Bedtime, Disp: 90 capsule, Rfl: 1     atorvastatin (LIPITOR) 40 MG tablet, Take 1 tablet (40 mg) by mouth daily, Disp: 90 tablet, Rfl: 3     amLODIPine (NORVASC) 2.5 MG tablet, Take 1 tablet (2.5 mg) by mouth 2 times daily, Disp: 180 tablet, Rfl: 3     finasteride (PROSCAR) 5 MG tablet, Take 1 tablet (5 mg) by mouth daily, Disp: 90 tablet, Rfl: 3     sildenafil (VIAGRA) 100 MG cap/tab, Take 1 tablet (100 mg) by mouth daily as needed for erectile dysfunction, Disp: 60 tablet, Rfl: 3     carvedilol (COREG) 25 MG tablet, Take 1 tablet (25 mg) by mouth 2 times daily (with meals), Disp: 180 tablet, Rfl: 3     NEW MED, CO-Q 10 taking daily., Disp: , Rfl:      lisinopril (PRINIVIL/ZESTRIL) 20 MG tablet, Take 1 tablet (20 mg) by mouth 2 times daily, Disp: 180 tablet, Rfl: 3     aspirin 81 MG tablet, Take 81 mg by mouth daily, Disp: , Rfl:     Patient Active Problem List   Diagnosis     Other specified cardiac dysrhythmias(427.89)     Elevated PSA     CARDIOVASCULAR SCREENING; LDL GOAL LESS THAN 130     Health Care Home     HTN (hypertension)     Cardiomyopathy (H)     Atrial fibrillation (H)     Hyperlipidemia     TYLER (obstructive sleep apnea)     Hypertension       Blood pressure 135/82, pulse 64, temperature 97.9  F (36.6  C), temperature source Tympanic, height 5' 11\" (1.803 m), weight 238 lb (108 kg).    Exam:  GENERAL APPEARANCE: healthy, alert and no distress  EYES: EOMI,  PERRL  CV: regular rates and rhythm  PSYCH: mentation appears normal and affect normal/bright      .(Z71.89) Travel " advice encounter  (primary encounter diagnosis)  Comment:   Plan: typhoid (VIVOTIF) CR capsule, HEPATITIS A         VACCINE (ADULT)        We reviewed the current CDC recommendations and will give the Hep A #1 today and call for the clinic RN at [phone] for the RN visit for the second Hep A shot.   The oral typhoid is ordered and start about 7 days before the departure and take one pill every other day for 4 doses. Use caution for insect bite prevention.       Quentin Tanner

## 2017-12-21 NOTE — NURSING NOTE
"Chief Complaint   Patient presents with     Imm/Inj     Here to discuss about injections and medication for travel.       Initial /82  Pulse 64  Temp 97.9  F (36.6  C) (Tympanic)  Ht 5' 11\" (1.803 m)  Wt 238 lb (108 kg)  BMI 33.19 kg/m2 Estimated body mass index is 33.19 kg/(m^2) as calculated from the following:    Height as of this encounter: 5' 11\" (1.803 m).    Weight as of this encounter: 238 lb (108 kg).  Medication Reconciliation: complete  "

## 2017-12-21 NOTE — PATIENT INSTRUCTIONS
Thank you for choosing [facility].  You may be receiving a survey in the mail from Kay Connolly regarding your visit today.  Please take a few minutes to complete and return the survey to let us know how we are doing.      If you have questions or concerns, please contact us via [employer] or you can contact your care team at [phone].    Our Clinic hours are:  Monday 6:40 am  to 7:00 pm  Tuesday -Friday 6:40 am to 5:00 pm    The [place] outpatient lab hours are:  Monday - Friday 6:10 am to 4:45 pm  Saturdays 7:00 am to 11:00 am  Appointments are required, call [phone]    If you have clinical questions after hours or would like to schedule an appointment,  call the clinic at [phone].      .(Z71.89) Travel advice encounter  (primary encounter diagnosis)  Comment:   Plan: typhoid (VIVOTIF) CR capsule, HEPATITIS A         VACCINE (ADULT)        We reviewed the current CDC recommendations and will give the Hep A #1 today and call for the clinic RN at [phone] for the RN visit for the second Hep A shot.   The oral typhoid is ordered and start about 7 days before the departure and take one pill every other day for 4 doses. Use caution for insect bite prevention.

## 2017-12-21 NOTE — MR AVS SNAPSHOT
After Visit Summary   12/21/2017    Doron Feldman    MRN: [id number]           Patient Information     Date Of Birth          1955        Visit Information        Provider Department      12/21/2017 2:20 PM Quetnin Tanner MD [facility]        Today's Diagnoses     Travel advice encounter    -  1      Care Instructions          Thank you for choosing [facility].  You may be receiving a survey in the mail from [facility] regarding your visit today.  Please take a few minutes to complete and return the survey to let us know how we are doing.      If you have questions or concerns, please contact us via [employer] or you can contact your care team at [phone].    Our Clinic hours are:  Monday 6:40 am  to 7:00 pm  Tuesday -Friday 6:40 am to 5:00 pm    The [place] outpatient lab hours are:  Monday - Friday 6:10 am to 4:45 pm  Saturdays 7:00 am to 11:00 am  Appointments are required, call [phone]    If you have clinical questions after hours or would like to schedule an appointment,  call the clinic at [phone].      .(Z71.89) Travel advice encounter  (primary encounter diagnosis)  Comment:   Plan: typhoid (VIVOTIF) CR capsule, HEPATITIS A         VACCINE (ADULT)        We reviewed the current CDC recommendations and will give the Hep A #1 today and call for the clinic RN at [phone] for the RN visit for the second Hep A shot.   The oral typhoid is ordered and start about 7 days before the departure and take one pill every other day for 4 doses. Use caution for insect bite prevention.             Follow-ups after your visit        Who to contact     If you have questions or need follow up information about today's clinic visit or your schedule please contact [facility] directly at [phone].  Normal or non-critical lab and imaging results will be communicated to you by MyChart, letter or phone within 4 business days after the clinic has received  "the results. If you do not hear from us within 7 days, please contact the clinic through [employer] or phone. If you have a critical or abnormal lab result, we will notify you by phone as soon as possible.  Submit refill requests through [employer] or call your pharmacy and they will forward the refill request to us. Please allow 3 business days for your refill to be completed.          Additional Information About Your Visit        [employer] Information     [employer] lets you send messages to your doctor, view your test results, renew your prescriptions, schedule appointments and more. To sign up, go to [email or web address] . Click on \"Log in\" on the left side of the screen, which will take you to the Welcome page. Then click on \"Sign up Now\" on the right side of the page.     You will be asked to enter the access code listed below, as well as some personal information. Please follow the directions to create your username and password.     Your access code is: [email or web address]  Expires: 3/21/2018  3:02 PM     Your access code will  in 90 days. If you need help or a new code, please call your [place] clinic or [phone].        Care EveryWhere ID     This is your Care EveryWhere ID. This could be used by other organizations to access your [place] medical records  [id number]        Your Vitals Were     Pulse Temperature Height BMI (Body Mass Index)          64 97.9  F (36.6  C) (Tympanic) 5' 11\" (1.803 m) 33.19 kg/m2         Blood Pressure from Last 3 Encounters:   17 135/82   10/31/17 134/72   10/12/17 138/72    Weight from Last 3 Encounters:   17 238 lb (108 kg)   10/31/17 232 lb (105.2 kg)   17 238 lb (108 kg)              We Performed the Following     HEPATITIS A VACCINE (ADULT)          Today's Medication Changes          These changes are accurate as of: 17  3:02 PM.  If you have any questions, ask your nurse or doctor.               Start taking these medicines.        " Dose/Directions    typhoid CR capsule   Commonly known as:  VIVOTIF   Used for:  Travel advice encounter   Started by:  Quentin Tanner MD        Dose:  1 capsule   Take 1 capsule by mouth every other day   Quantity:  4 capsule   Refills:  0            Where to get your medicines      These medications were sent to [facility] [place] - [place], [place] - [address]  [address] [address]     Phone:  [phone]     typhoid CR capsule                Primary Care Provider Office Phone # Fax #    Fahad Fisher [phone] [address] [address]        Equal Access to Services     : Hadii aad ku hadasho Soomaali, waaxda luqadaha, qaybta kaalmada adeegyada, waxay idiin hayaan miya ramesh . So [facility] [phone].    ATENCIÓN: Si habla español, tiene a snell disposición servicios gratuitos de asistencia lingüística. Llame al [phone].    We comply with applicable federal civil rights laws and [place] laws. We do not discriminate on the basis of race, color, national origin, age, disability, sex, sexual orientation, or gender identity.            Thank you!     Thank you for choosing [facility]  for your care. Our goal is always to provide you with excellent care. Hearing back from our patients is one way we can continue to improve our services. Please take a few minutes to complete the written survey that you may receive in the mail after your visit with us. Thank you!             Your Updated Medication List - Protect others around you: Learn how to safely use, store and throw away your medicines at www.disposemymeds.org.          This list is accurate as of: 12/21/17  3:02 PM.  Always use your most recent med list.                   Brand Name Dispense Instructions for use Diagnosis    amLODIPine 2.5 MG tablet    NORVASC    180 tablet    Take 1 tablet (2.5 mg) by mouth 2 times daily    Persistent atrial  fibrillation (H)       aspirin 81 MG tablet      Take 81 mg by mouth daily        atorvastatin 40 MG tablet    LIPITOR    90 tablet    Take 1 tablet (40 mg) by mouth daily    Hyperlipidemia       carvedilol 25 MG tablet    COREG    180 tablet    Take 1 tablet (25 mg) by mouth 2 times daily (with meals)    Cardiomyopathy, unspecified       finasteride 5 MG tablet    PROSCAR    90 tablet    Take 1 tablet (5 mg) by mouth daily    Elevated PSA       lisinopril 20 MG tablet    PRINIVIL/ZESTRIL    180 tablet    Take 1 tablet (20 mg) by mouth 2 times daily    HTN (hypertension)       NEW MED      CO-Q 10 taking daily.    Preop general physical exam       sildenafil 100 MG tablet    VIAGRA    60 tablet    Take 1 tablet (100 mg) by mouth daily as needed for erectile dysfunction    Erectile dysfunction, unspecified erectile dysfunction type       tamsulosin 0.4 MG capsule    FLOMAX    90 capsule    Take 1 capsule (0.4 mg) by mouth At Bedtime    BPH with urinary obstruction       typhoid CR capsule    VIVOTIF    4 capsule    Take 1 capsule by mouth every other day    Travel advice encounter

## 2018-01-03 ENCOUNTER — TELEPHONE (OUTPATIENT)
Dept: CARDIOLOGY | Facility: CLINIC | Age: 63
End: 2018-01-03

## 2018-01-03 NOTE — TELEPHONE ENCOUNTER
Per Bruno, CNP, pt switched from Pravachol to Lipitor 40mg on 11/3.  Pt has a hx of CAD, PAF and HTN. Repeat lipids 12/20 show:    Component      Latest Ref Rng & Units 9/18/2017 12/20/2017   Cholesterol      <200 mg/dL 197 148   Triglycerides      <150 mg/dL 167 (H) 179 (H)   HDL Cholesterol      >39 mg/dL 38 (L) 38 (L)   LDL Cholesterol Calculated      <100 mg/dL 126 (H) 74   Non HDL Cholesterol      <130 mg/dL 159 (H) 110     Called pt to review.  He is tolerating the Lipitor well without concerns.  Reviewed heart healthy diet and increased exercise.  Orders in Epic for 10/2018 f/u with Dr. Martinez. Messaged to [facility] to review.  KMortimer RN

## 2018-01-17 DIAGNOSIS — I10 HTN (HYPERTENSION): ICD-10-CM

## 2018-01-17 RX ORDER — LISINOPRIL 20 MG/1
20 TABLET ORAL 2 TIMES DAILY
Qty: 180 TABLET | Refills: 2 | Status: SHIPPED | OUTPATIENT
Start: 2018-01-17 | End: 2018-09-14

## 2018-03-09 DIAGNOSIS — I10 HTN (HYPERTENSION): Primary | ICD-10-CM

## 2018-03-09 RX ORDER — CARVEDILOL 25 MG/1
25 TABLET ORAL 2 TIMES DAILY WITH MEALS
Qty: 180 TABLET | Refills: 1 | Status: SHIPPED | OUTPATIENT
Start: 2018-03-09 | End: 2018-08-30

## 2018-07-25 DIAGNOSIS — N13.8 BPH WITH URINARY OBSTRUCTION: ICD-10-CM

## 2018-07-25 DIAGNOSIS — N40.1 BPH WITH URINARY OBSTRUCTION: ICD-10-CM

## 2018-07-26 NOTE — TELEPHONE ENCOUNTER
LOV 10/12/17  Pt overdue for PSA lab  Left vm notifying pt to return call.    Corey Rosa RN....7/26/2018 3:00 PM

## 2018-07-31 DIAGNOSIS — R97.20 ELEVATED PROSTATE SPECIFIC ANTIGEN (PSA): ICD-10-CM

## 2018-07-31 LAB — PSA SERPL-MCNC: 4.78 UG/L (ref 0–4)

## 2018-07-31 PROCEDURE — 84153 ASSAY OF PSA TOTAL: CPT | Performed by: UROLOGY

## 2018-07-31 PROCEDURE — 36415 COLL VENOUS BLD VENIPUNCTURE: CPT | Performed by: UROLOGY

## 2018-07-31 RX ORDER — TAMSULOSIN HYDROCHLORIDE 0.4 MG/1
0.4 CAPSULE ORAL AT BEDTIME
Qty: 90 CAPSULE | Refills: 0 | Status: SHIPPED | OUTPATIENT
Start: 2018-07-31 | End: 2018-11-05

## 2018-08-29 DIAGNOSIS — R97.20 ELEVATED PSA: ICD-10-CM

## 2018-08-29 RX ORDER — FINASTERIDE 5 MG/1
5 TABLET, FILM COATED ORAL DAILY
Qty: 90 TABLET | Refills: 0 | Status: SHIPPED | OUTPATIENT
Start: 2018-08-29 | End: 2018-12-12

## 2018-08-30 DIAGNOSIS — I10 HTN (HYPERTENSION): ICD-10-CM

## 2018-08-30 RX ORDER — CARVEDILOL 25 MG/1
25 TABLET ORAL 2 TIMES DAILY WITH MEALS
Qty: 180 TABLET | Refills: 0 | Status: SHIPPED | OUTPATIENT
Start: 2018-08-30 | End: 2018-12-13

## 2018-09-14 DIAGNOSIS — I10 HTN (HYPERTENSION): ICD-10-CM

## 2018-09-14 RX ORDER — LISINOPRIL 20 MG/1
20 TABLET ORAL 2 TIMES DAILY
Qty: 180 TABLET | Refills: 0 | Status: SHIPPED | OUTPATIENT
Start: 2018-09-14 | End: 2019-03-04

## 2018-09-19 DIAGNOSIS — E78.5 HYPERLIPIDEMIA: ICD-10-CM

## 2018-09-19 RX ORDER — ATORVASTATIN CALCIUM 40 MG/1
40 TABLET, FILM COATED ORAL DAILY
Qty: 90 TABLET | Refills: 0 | Status: SHIPPED | OUTPATIENT
Start: 2018-09-19 | End: 2019-03-04

## 2018-10-30 ENCOUNTER — OFFICE VISIT (OUTPATIENT)
Dept: CARDIOLOGY | Facility: CLINIC | Age: 63
End: 2018-10-30
Attending: PHYSICIAN ASSISTANT
Payer: COMMERCIAL

## 2018-10-30 VITALS
SYSTOLIC BLOOD PRESSURE: 136 MMHG | BODY MASS INDEX: 32.9 KG/M2 | HEIGHT: 71 IN | DIASTOLIC BLOOD PRESSURE: 80 MMHG | HEART RATE: 60 BPM | WEIGHT: 235 LBS

## 2018-10-30 DIAGNOSIS — E78.2 MIXED HYPERLIPIDEMIA: Primary | ICD-10-CM

## 2018-10-30 DIAGNOSIS — I25.10 CORONARY ARTERY DISEASE INVOLVING NATIVE CORONARY ARTERY OF NATIVE HEART WITHOUT ANGINA PECTORIS: ICD-10-CM

## 2018-10-30 DIAGNOSIS — I48.19 PERSISTENT ATRIAL FIBRILLATION (H): ICD-10-CM

## 2018-10-30 DIAGNOSIS — I10 ESSENTIAL HYPERTENSION: ICD-10-CM

## 2018-10-30 PROCEDURE — 93000 ELECTROCARDIOGRAM COMPLETE: CPT | Performed by: INTERNAL MEDICINE

## 2018-10-30 PROCEDURE — 99214 OFFICE O/P EST MOD 30 MIN: CPT | Performed by: INTERNAL MEDICINE

## 2018-10-30 NOTE — LETTER
10/30/2018    Fahad Fisher MD  [address] [address]    RE: Doron Feldman       Dear Colleague,    I had the pleasure of seeing Doron Feldman in the [facility].    HPI and Plan:   See dictation    Orders Placed This Encounter   Procedures     Follow-Up with Cardiac Advanced Practice Provider     EKG 12-lead complete w/read - Clinics (performed today)     EKG 12-lead complete w/read - Clinics (to be scheduled)       No orders of the defined types were placed in this encounter.      There are no discontinued medications.      Encounter Diagnosis   Name Primary?     Persistent atrial fibrillation (H)        CURRENT MEDICATIONS:  Current Outpatient Prescriptions   Medication Sig Dispense Refill     amLODIPine (NORVASC) 2.5 MG tablet Take 1 tablet (2.5 mg) by mouth 2 times daily 180 tablet 3     aspirin 81 MG tablet Take 81 mg by mouth daily       atorvastatin (LIPITOR) 40 MG tablet Take 1 tablet (40 mg) by mouth daily 90 tablet 0     carvedilol (COREG) 25 MG tablet Take 1 tablet (25 mg) by mouth 2 times daily (with meals) 180 tablet 0     finasteride (PROSCAR) 5 MG tablet Take 1 tablet (5 mg) by mouth daily 90 tablet 0     lisinopril (PRINIVIL/ZESTRIL) 20 MG tablet Take 1 tablet (20 mg) by mouth 2 times daily 180 tablet 0     NEW MED CO-Q 10 taking daily.       sildenafil (VIAGRA) 100 MG cap/tab Take 1 tablet (100 mg) by mouth daily as needed for erectile dysfunction 60 tablet 3     tamsulosin (FLOMAX) 0.4 MG capsule Take 1 capsule (0.4 mg) by mouth At Bedtime 90 capsule 0     typhoid (VIVOTIF) CR capsule Take 1 capsule by mouth every other day (Patient not taking: Reported on 10/30/2018) 4 capsule 0       ALLERGIES     Allergies   Allergen Reactions     Nka [No Known Allergies]        PAST MEDICAL HISTORY:  Past Medical History:   Diagnosis Date     Atrial fibrillation (H) 9/23/10     CARDIAC DYSRHYTHMIAS NEC 4/3/2008     Cardiomyopathy, idiopathic (H)       Coronary artery disease     Stent     Elevated PSA 10/7/2010     HTN (hypertension) 4/25/2011     Hyperlipidemia      Hypertension      TYLER (obstructive sleep apnea)     CPAP     Shortness of breath        PAST SURGICAL HISTORY:  Past Surgical History:   Procedure Laterality Date     CARDIAC CATHERIZATION  2011    mid RCA stenosis of 85-90%-BMS     H ABLATION FOCAL AFIB  3/27/14     ORTHOPEDIC SURGERY      knee- both arthrocsopic     ORTHOPEDIC SURGERY      left pinky finger     REMOVE FOREIGN BODY FINGER Left 2/7/2017    Procedure: REMOVE FOREIGN BODY FINGER;  Surgeon: Adilene Mcdonald MD;  Location: [facility] OR     SURGICAL HISTORY OF -       right knee arthoscopic     SURGICAL HISTORY OF -       left 5th finger surgery       FAMILY HISTORY:  Family History   Problem Relation Age of Onset     Diabetes Father      Cerebrovascular Disease Father      Unknown/Adopted Maternal Grandfather      Unknown/Adopted Paternal Grandmother      Cancer Paternal Grandfather      unknown     Cardiovascular Mother      had pacemaker placed unknown reason why       SOCIAL HISTORY:  Social History     Social History     Marital status:      Spouse name: N/A     Number of children: N/A     Years of education: N/A     Social History Main Topics     Smoking status: Never Smoker     Smokeless tobacco: Never Used     Alcohol use Yes      Comment: 2-3 drinks per week     Drug use: No     Sexual activity: Yes     Partners: Female     Other Topics Concern     Parent/Sibling W/ Cabg, Mi Or Angioplasty Before 65f 55m? No     Caffeine Concern Yes     2 cups caffeine per day     Sleep Concern Yes     sleep apnea, wears cpap at night     Stress Concern No     Weight Concern No     Special Diet No     Back Care No     Exercise Yes     knee exercises, weights     Social History Narrative       Review of Systems:  Skin:  Negative       Eyes:  Positive for   reading glasses  ENT:  Negative      Respiratory:  Positive for sleep apnea;CPAP      Cardiovascular:  Negative      Gastroenterology: Negative      Genitourinary:  Negative      Musculoskeletal:  Positive for joint pain    Neurologic:  Negative      Psychiatric:  Negative      Heme/Lymph/Imm:         Endocrine:  Negative        [id number]          Thank you for allowing me to participate in the care of your patient.      Sincerely,     Gina Martinez MD     [facility]    cc:   Moriah Bruce PACLIFFORD  [address]  [place] [address]

## 2018-10-30 NOTE — PROGRESS NOTES
HPI and Plan:   See dictation    Orders Placed This Encounter   Procedures     Follow-Up with Cardiac Advanced Practice Provider     EKG 12-lead complete w/read - Clinics (performed today)     EKG 12-lead complete w/read - Clinics (to be scheduled)       No orders of the defined types were placed in this encounter.      There are no discontinued medications.      Encounter Diagnosis   Name Primary?     Persistent atrial fibrillation (H)        CURRENT MEDICATIONS:  Current Outpatient Prescriptions   Medication Sig Dispense Refill     amLODIPine (NORVASC) 2.5 MG tablet Take 1 tablet (2.5 mg) by mouth 2 times daily 180 tablet 3     aspirin 81 MG tablet Take 81 mg by mouth daily       atorvastatin (LIPITOR) 40 MG tablet Take 1 tablet (40 mg) by mouth daily 90 tablet 0     carvedilol (COREG) 25 MG tablet Take 1 tablet (25 mg) by mouth 2 times daily (with meals) 180 tablet 0     finasteride (PROSCAR) 5 MG tablet Take 1 tablet (5 mg) by mouth daily 90 tablet 0     lisinopril (PRINIVIL/ZESTRIL) 20 MG tablet Take 1 tablet (20 mg) by mouth 2 times daily 180 tablet 0     NEW MED CO-Q 10 taking daily.       sildenafil (VIAGRA) 100 MG cap/tab Take 1 tablet (100 mg) by mouth daily as needed for erectile dysfunction 60 tablet 3     tamsulosin (FLOMAX) 0.4 MG capsule Take 1 capsule (0.4 mg) by mouth At Bedtime 90 capsule 0     typhoid (VIVOTIF) CR capsule Take 1 capsule by mouth every other day (Patient not taking: Reported on 10/30/2018) 4 capsule 0       ALLERGIES     Allergies   Allergen Reactions     Nka [No Known Allergies]        PAST MEDICAL HISTORY:  Past Medical History:   Diagnosis Date     Atrial fibrillation (H) 9/23/10     CARDIAC DYSRHYTHMIAS NEC 4/3/2008     Cardiomyopathy, idiopathic (H)      Coronary artery disease     Stent     Elevated PSA 10/7/2010     HTN (hypertension) 4/25/2011     Hyperlipidemia      Hypertension      TYLER (obstructive sleep apnea)     CPAP     Shortness of breath        PAST SURGICAL  HISTORY:  Past Surgical History:   Procedure Laterality Date     CARDIAC CATHERIZATION  2011    mid RCA stenosis of 85-90%-BMS     H ABLATION FOCAL AFIB  3/27/14     ORTHOPEDIC SURGERY      knee- both arthrocsopic     ORTHOPEDIC SURGERY      left pinky finger     REMOVE FOREIGN BODY FINGER Left 2/7/2017    Procedure: REMOVE FOREIGN BODY FINGER;  Surgeon: Adilene Mcdonald MD;  Location: [facility] OR     SURGICAL HISTORY OF -       right knee arthoscopic     SURGICAL HISTORY OF -       left 5th finger surgery       FAMILY HISTORY:  Family History   Problem Relation Age of Onset     Diabetes Father      Cerebrovascular Disease Father      Unknown/Adopted Maternal Grandfather      Unknown/Adopted Paternal Grandmother      Cancer Paternal Grandfather      unknown     Cardiovascular Mother      had pacemaker placed unknown reason why       SOCIAL HISTORY:  Social History     Social History     Marital status:      Spouse name: N/A     Number of children: N/A     Years of education: N/A     Social History Main Topics     Smoking status: Never Smoker     Smokeless tobacco: Never Used     Alcohol use Yes      Comment: 2-3 drinks per week     Drug use: No     Sexual activity: Yes     Partners: Female     Other Topics Concern     Parent/Sibling W/ Cabg, Mi Or Angioplasty Before 65f 55m? No     Caffeine Concern Yes     2 cups caffeine per day     Sleep Concern Yes     sleep apnea, wears cpap at night     Stress Concern No     Weight Concern No     Special Diet No     Back Care No     Exercise Yes     knee exercises, weights     Social History Narrative       Review of Systems:  Skin:  Negative       Eyes:  Positive for   reading glasses  ENT:  Negative      Respiratory:  Positive for sleep apnea;CPAP     Cardiovascular:  Negative      Gastroenterology: Negative      Genitourinary:  Negative      Musculoskeletal:  Positive for joint pain    Neurologic:  Negative      Psychiatric:  Negative      Heme/Lymph/Imm:          Endocrine:  Negative        [id number]

## 2018-10-30 NOTE — PATIENT INSTRUCTIONS
1.  Hold atorvastatin for 2 weeks; if you feel better call us [phone]; if not, restart atorvastatin.

## 2018-10-30 NOTE — LETTER
10/30/2018      Fahad Fisher MD  [address] [address]      RE: Doron Beltran       Dear Colleague,    I had the pleasure of seeing Doron Beltran in the [facility].    Service Date: 10/30/2018      HISTORY OF PRESENT ILLNESS:  I had the pleasure of seeing Mr. Bhavik Beltran, a delightful 62-year-old male with the following problems:   A.  Coronary artery disease with RCA stenting in 2011.   B.  Symptomatic persistent atrial fibrillation with failure of sotalol and dronedarone to maintain sinus rhythm.  Catheter ablation in 03/2014.  He has maintained sinus rhythm since then without antiarrhythmic drug therapy.   C.  Hypertension.   D.  Sleep apnea.  He is compliant with CPAP therapy.      Bhavik has been feeling well.  He has had no awareness of recurrent atrial fibrillation.  He is not on anticoagulation as per his choice.  His CHADS2-VASc score is 2 (hypertension, vascular disease).  He is on aspirin.      He mentioned that he is getting stiff joints and muscle aches, especially in the morning.  It takes him a while if he starts moving without pain.  He is asking whether this may be a side effect of the medication.      He has no chest pain with exertion.  He has no orthopnea, PND, lower extremity edema, syncope or near-syncope.      PHYSICAL EXAMINATION:   VITAL SIGNS:  Blood pressure 136/80, pulse 60 and regular, weight 106 kg, height 180 cm.     GENERAL:  He is a pleasant gentleman who is mildly overweight, in no apparent distress.   HEENT:  Head normocephalic, atraumatic.   NECK:  Supple without normal JVP.   LUNGS:  Clear.   CARDIOVASCULAR:  Normal JVP.  Regular rhythm without murmur or rub.   EXTREMITIES:  No edema.      DIAGNOSTIC STUDIES:  His 12-lead ECG today showed mild sinus bradycardia at 57.  Otherwise, normal tracing.      Bhavik had a nuclear stress test in 2017 which showed no obvious ischemia, though the achieved heart rate was only 71% of  predicted.      IMPRESSION:   1.  Coronary artery disease.  He is asymptomatic.  His nuclear stress test last year was nondiagnostic as he did not achieve peak heart rate; however, he did not have any ischemia on the test.  In the absence of symptoms, we did not pursue it further.  He is on aspirin and statin.  His blood pressure is generally well controlled.   2.  History of persistent atrial fibrillation.  This was quite symptomatic and he has felt much better the past 4 years in sinus rhythm.  He is not on antiarrhythmic drug.  I have spoken to him about the general recommendation of continuing warfarin long-term if the CHADS2-VASc score is higher, but Bhavik his preferred to stay on aspirin and monitor his heart rate, which he does periodically.  I would consider a 14-day ZIO Patch monitor next year.   3.  Myalgias, arthralgias.  Possibly related to atorvastatin.  I suggested a 2-week holiday of the drug and if he feels better he should give us a call.  If not, he should resume atorvastatin and speak to his primary care provider about this symptom.   4.  Obstructive sleep apnea.  He is compliant with CPAP therapy.  He needs to lose weight and we addressed this today.      RECOMMENDATIONS:  As above.  We plan to see him again in 1 year with an EKG at the time of the visit.      cc:   Fahad Fisher MD   [facility]    [address] [place] [address]         KAYCE ROBERTSON MD             D: 10/30/2018   T: 10/30/2018   MT: OPAL      Name:     KASEY FRIEDMAN   MRN:      [id number]        Account:      [id number]   :      1955           Service Date: 10/30/2018      Document: [id number]         Outpatient Encounter Prescriptions as of 10/30/2018   Medication Sig Dispense Refill     amLODIPine (NORVASC) 2.5 MG tablet Take 1 tablet (2.5 mg) by mouth 2 times daily 180 tablet 3     aspirin 81 MG tablet Take 81 mg by mouth daily       atorvastatin (LIPITOR)  40 MG tablet Take 1 tablet (40 mg) by mouth daily 90 tablet 0     carvedilol (COREG) 25 MG tablet Take 1 tablet (25 mg) by mouth 2 times daily (with meals) 180 tablet 0     finasteride (PROSCAR) 5 MG tablet Take 1 tablet (5 mg) by mouth daily 90 tablet 0     lisinopril (PRINIVIL/ZESTRIL) 20 MG tablet Take 1 tablet (20 mg) by mouth 2 times daily 180 tablet 0     NEW MED CO-Q 10 taking daily.       sildenafil (VIAGRA) 100 MG cap/tab Take 1 tablet (100 mg) by mouth daily as needed for erectile dysfunction 60 tablet 3     tamsulosin (FLOMAX) 0.4 MG capsule Take 1 capsule (0.4 mg) by mouth At Bedtime 90 capsule 0     typhoid (VIVOTIF) CR capsule Take 1 capsule by mouth every other day (Patient not taking: Reported on 10/30/2018) 4 capsule 0     No facility-administered encounter medications on file as of 10/30/2018.        Again, thank you for allowing me to participate in the care of your patient.      Sincerely,    Gina Martinez MD     [facility]

## 2018-10-30 NOTE — PROGRESS NOTES
Service Date: 10/30/2018      HISTORY OF PRESENT ILLNESS:    I had the pleasure of seeing . Bhavik Beltran, a delightful 62-year-old male with the following medical issues:   A.  Coronary artery disease with RCA stenting in 2011.   B.  Symptomatic persistent atrial fibrillation with failure of sotalol and dronedarone to maintain sinus rhythm.  Catheter ablation in 03/2014.  He has maintained sinus rhythm since then without antiarrhythmic drug therapy.   C.  Hypertension.   D.  Sleep apnea.  He is compliant with CPAP therapy.      Bhavik has been feeling well.  He has had no awareness of atrial fibrillation.  He is not on anticoagulation as per his choice.  His [id number] score is 2 (hypertension, vascular disease).  He is on aspirin.      He mentioned that he is getting stiff joints and muscle aches, especially in the mornings.  It takes him a while to start moving without joint/muscle pain.  He asked whether this may be a side-effect of a medication.      He has no chest pain with exertion.  He has no orthopnea, PND, lower extremity edema, syncope or near-syncope.      PHYSICAL EXAMINATION:   VITAL SIGNS:  Blood pressure 136/80, pulse 60 and regular, weight 106 kg, height 180 cm.     GENERAL:  He is a pleasant gentleman who is mildly overweight, in no apparent distress.   HEENT:  Head normocephalic, atraumatic.   NECK:  Supple without normal JVP.   LUNGS:  Clear.   CARDIOVASCULAR:  Normal JVP.  Regular rhythm without murmur or rub.   EXTREMITIES:  No edema.      DIAGNOSTIC STUDIES:    His 12-lead ECG today showed mild sinus bradycardia at 57.  Otherwise, normal tracing.      Bhavik had a nuclear stress test in 2017 which showed no obvious ischemia, though the peak heart rate was only 71% of predicted.      IMPRESSION:   1.  Coronary artery disease.  He is asymptomatic.  His nuclear stress test last year was nondiagnostic as he did not achieve an adequate heart rate; there was no ischemia on the test.  In the absence of  symptoms, I did not pursue it further.  He is on aspirin and statin.  His blood pressure is generally well controlled.   2.  History of persistent atrial fibrillation.  This used to be quite symptomatic.  Bhavik has felt much better in sinus rhythm.  He is not on an antiarrhythmic drug.  I have recommended long-term anticoagulation given his [id number] score of 2, but Bhavik prefers to stay on aspirin and monitor his heart rate.  I do not think this is unreasonable.  Consider a cardiac monitor next year.   3.  Myalgias, arthralgias.  Possibly related to atorvastatin.  I suggested a 2-week holiday off the drug.  if he feels better he should give us a call.  If not, resume atorvastatin and speak to his primary care provider about these symptoms.   4.  Obstructive sleep apnea.  He is compliant with CPAP therapy.  He needs to lose weight.  We addressed this today.      RECOMMENDATIONS:    As above.  We plan to see him again in 1 year with an EKG at the time of the visit.   Time spent was 25 minutes.  More than 50% of time was discussion and counseling.         KAYCE ROBERTSON MD, [facility]         cc:   Fahad Fisher MD   [facility]    [address] [place]  [address]             D: 10/30/2018   T: 10/30/2018   MT: OPAL      Name:     KASEY FRIEDMAN   MRN:      [id number]        Account:      [id number]   :      1955           Service Date: 10/30/2018      Document: [id number]

## 2018-10-30 NOTE — MR AVS SNAPSHOT
After Visit Summary   10/30/2018    Doron Feldman    MRN: [id number]           Patient Information     Date Of Birth          1955        Visit Information        Provider Department      10/30/2018 9:45 AM Gina Martinez MD [facility]        Today's Diagnoses     Persistent atrial fibrillation (H)          Care Instructions    1.  Hold atorvastatin for 2 weeks; if you feel better call us [phone]; if not, restart atorvastatin.           Follow-ups after your visit        Additional Services     Follow-Up with Cardiac Advanced Practice Provider                 Future tests that were ordered for you today     Open Future Orders        Priority Expected Expires Ordered    Follow-Up with Cardiac Advanced Practice Provider Routine 10/30/2019 10/31/2019 10/30/2018    EKG 12-lead complete w/read - Clinics (to be scheduled) Routine 10/30/2019 10/31/2019 10/30/2018            Who to contact     If you have questions or need follow up information about today's clinic visit or your schedule please contact [facility] directly at [phone].  Normal or non-critical lab and imaging results will be communicated to you by Flythegaphart, letter or phone within 4 business days after the clinic has received the results. If you do not hear from us within 7 days, please contact the clinic through Trovet or phone. If you have a critical or abnormal lab result, we will notify you by phone as soon as possible.  Submit refill requests through [email or web address] or call your pharmacy and they will forward the refill request to us. Please allow 3 business days for your refill to be completed.          Additional Information About Your Visit        Flythegaphart Information     [email or web address] gives you secure access to your electronic health record. If you see a primary care provider, you can also send messages to your care team and make appointments.  "If you have questions, please call your primary care clinic.  If you do not have a primary care provider, please call [phone] and they will assist you.        Care EveryWhere ID     This is your Care EveryWhere ID. This could be used by other organizations to access your [facility] medical records  [id number]        Your Vitals Were     Pulse Height BMI (Body Mass Index)             60 1.803 m (5' 11\") 32.78 kg/m2          Blood Pressure from Last 3 Encounters:   10/30/18 136/80   12/21/17 135/82   10/31/17 134/72    Weight from Last 3 Encounters:   10/30/18 106.6 kg (235 lb)   12/21/17 108 kg (238 lb)   10/31/17 105.2 kg (232 lb)              We Performed the Following     EKG 12-lead complete w/read - Clinics (performed today)     Follow-Up with Electrophysiologist        Primary Care Provider Office Phone # Fax #    Fahad Fisher [phone] [address] [address]        Equal Access to Services     MARINA GUAJARDO : Hadii aad ku hadasho Soomaali, waaxda luqadaha, qaybta kaalmada adekyrieyameme, william ramesh . So [facility] [phone].    ATENCIÓN: Si habla español, tiene a snell disposición servicios gratuitos de asistencia lingüística. Ll[facility] [phone].    We comply with applicable federal civil rights laws and [place] laws. We do not discriminate on the basis of race, color, national origin, age, disability, sex, sexual orientation, or gender identity.            Thank you!     Thank you for choosing [facility]  for your care. Our goal is always to provide you with excellent care. Hearing back from our patients is one way we can continue to improve our services. Please take a few minutes to complete the written survey that you may receive in the mail after your visit with us. Thank you!             Your Updated Medication List - Protect others around you: Learn how to safely use, store and throw away your " medicines at www.disposemymeds.org.          This list is accurate as of 10/30/18 10:10 AM.  Always use your most recent med list.                   Brand Name Dispense Instructions for use Diagnosis    amLODIPine 2.5 MG tablet    NORVASC    180 tablet    Take 1 tablet (2.5 mg) by mouth 2 times daily    Persistent atrial fibrillation (H)       aspirin 81 MG tablet      Take 81 mg by mouth daily        atorvastatin 40 MG tablet    LIPITOR    90 tablet    Take 1 tablet (40 mg) by mouth daily    Hyperlipidemia       carvedilol 25 MG tablet    COREG    180 tablet    Take 1 tablet (25 mg) by mouth 2 times daily (with meals)    HTN (hypertension)       finasteride 5 MG tablet    PROSCAR    90 tablet    Take 1 tablet (5 mg) by mouth daily    Elevated PSA       lisinopril 20 MG tablet    PRINIVIL/ZESTRIL    180 tablet    Take 1 tablet (20 mg) by mouth 2 times daily    HTN (hypertension)       NEW MED      CO-Q 10 taking daily.    Preop general physical exam       sildenafil 100 MG tablet    VIAGRA    60 tablet    Take 1 tablet (100 mg) by mouth daily as needed for erectile dysfunction    Erectile dysfunction, unspecified erectile dysfunction type       tamsulosin 0.4 MG capsule    FLOMAX    90 capsule    Take 1 capsule (0.4 mg) by mouth At Bedtime    BPH with urinary obstruction       typhoid CR capsule    VIVOTIF    4 capsule    Take 1 capsule by mouth every other day    Travel advice encounter

## 2018-11-05 DIAGNOSIS — N40.1 BPH WITH URINARY OBSTRUCTION: ICD-10-CM

## 2018-11-05 DIAGNOSIS — N13.8 BPH WITH URINARY OBSTRUCTION: ICD-10-CM

## 2018-11-05 NOTE — TELEPHONE ENCOUNTER
Requested Prescriptions   Pending Prescriptions Disp Refills     tamsulosin (FLOMAX) 0.4 MG capsule 90 capsule 0     Sig: Take 1 capsule (0.4 mg) by mouth At Bedtime    There is no refill protocol information for this order        Last Written Prescription Date:  7/31/2018  Last Fill Quantity: 90,  # refills: 0   Last office visit: 10/12/2017 with prescribing provider:  Ciro   Future Office Visit:

## 2018-11-05 NOTE — TELEPHONE ENCOUNTER
"Routing refill request to provider for review/approval because:  Patient needs to be seen because it has been more than 1 year since last office visit.  Failed [facility] refill protocol, see below:      Requested Prescriptions   Pending Prescriptions Disp Refills     tamsulosin (FLOMAX) 0.4 MG capsule  Last Written Prescription Date:  7/31/18  Last Fill Quantity: 90,  # refills: 0   Last office visit: 10/12/2017 with prescribing provider:     Future Office Visit:     90 capsule 0     Sig: Take 1 capsule (0.4 mg) by mouth At Bedtime    Alpha Blockers Failed    11/5/2018 11:21 AM       Failed - Recent (12 mo) or future (30 days) visit within the authorizing provider's specialty    Patient had office visit in the last 12 months or has a visit in the next 30 days with authorizing provider or within the authorizing provider's specialty.  See \"Patient Info\" tab in inbasket, or \"Choose Columns\" in Meds & Orders section of the refill encounter.             Failed - Patient does not have Tadalafil, Vardenafil, or Sildenafil on their medication list       Passed - Blood pressure under 140/90 in past 12 months    BP Readings from Last 3 Encounters:   10/30/18 136/80   12/21/17 135/82   10/31/17 134/72                Passed - Patient is 18 years of age or older        Terrie Pan RN - BC      "

## 2018-11-06 RX ORDER — TAMSULOSIN HYDROCHLORIDE 0.4 MG/1
0.4 CAPSULE ORAL AT BEDTIME
Qty: 90 CAPSULE | Refills: 0 | Status: SHIPPED | OUTPATIENT
Start: 2018-11-06 | End: 2018-12-12

## 2018-11-08 RX ORDER — TAMSULOSIN HYDROCHLORIDE 0.4 MG/1
0.4 CAPSULE ORAL AT BEDTIME
Qty: 90 CAPSULE | Refills: 0 | Status: SHIPPED | OUTPATIENT
Start: 2018-11-08 | End: 2019-04-11

## 2018-11-30 ENCOUNTER — TELEPHONE (OUTPATIENT)
Dept: LAB | Facility: CLINIC | Age: 63
End: 2018-11-30

## 2018-12-04 ENCOUNTER — ALLIED HEALTH/NURSE VISIT (OUTPATIENT)
Dept: FAMILY MEDICINE | Facility: CLINIC | Age: 63
End: 2018-12-04
Payer: COMMERCIAL

## 2018-12-04 ENCOUNTER — TELEPHONE (OUTPATIENT)
Dept: FAMILY MEDICINE | Facility: CLINIC | Age: 63
End: 2018-12-04

## 2018-12-04 DIAGNOSIS — Z23 NEED FOR VACCINATION: Primary | ICD-10-CM

## 2018-12-04 DIAGNOSIS — R97.20 ELEVATED PSA: Primary | ICD-10-CM

## 2018-12-04 DIAGNOSIS — Z12.11 SPECIAL SCREENING FOR MALIGNANT NEOPLASMS, COLON: Primary | ICD-10-CM

## 2018-12-04 DIAGNOSIS — R97.20 ELEVATED PSA: ICD-10-CM

## 2018-12-04 LAB — PSA SERPL-MCNC: 4.71 UG/L (ref 0–4)

## 2018-12-04 PROCEDURE — 90472 IMMUNIZATION ADMIN EACH ADD: CPT

## 2018-12-04 PROCEDURE — 99207 ZZC NO CHARGE NURSE ONLY: CPT

## 2018-12-04 PROCEDURE — 90632 HEPA VACCINE ADULT IM: CPT

## 2018-12-04 PROCEDURE — 90471 IMMUNIZATION ADMIN: CPT

## 2018-12-04 PROCEDURE — 84153 ASSAY OF PSA TOTAL: CPT | Performed by: UROLOGY

## 2018-12-04 PROCEDURE — 90750 HZV VACC RECOMBINANT IM: CPT

## 2018-12-04 PROCEDURE — 36415 COLL VENOUS BLD VENIPUNCTURE: CPT | Performed by: UROLOGY

## 2018-12-04 PROCEDURE — 90732 PPSV23 VACC 2 YRS+ SUBQ/IM: CPT

## 2018-12-04 NOTE — NURSING NOTE
Screening Questionnaire for Adult Immunization    Are you sick today?   No   Do you have allergies to medications, food, a vaccine component or latex?   No   Have you ever had a serious reaction after receiving a vaccination?   No   Do you have a long-term health problem with heart disease, lung disease, asthma, kidney disease, metabolic disease (e.g. diabetes), anemia, or other blood disorder?   No   Do you have cancer, leukemia, HIV/AIDS, or any other immune system problem?   No   In the past 3 months, have you taken medications that affect  your immune system, such as prednisone, other steroids, or anticancer drugs; drugs for the treatment of rheumatoid arthritis, Crohn s disease, or psoriasis; or have you had radiation treatments?   No   Have you had a seizure, or a brain or other nervous system problem?   No   During the past year, have you received a transfusion of blood or blood     products, or been given immune (gamma) globulin or antiviral drug?   No   For women: Are you pregnant or is there a chance you could become        pregnant during the next month?   No   Have you received any vaccinations in the past 4 weeks?   No     Immunization questionnaire answers were all negative.        Per orders of Dr. Fisher, injection of shingrix, Hep A, PPSV23  given by Day Chua. Patient instructed to remain in clinic for 15 minutes afterwards, and to report any adverse reaction to me immediately.       Screening performed by Day Chua on 12/4/2018 at 8:47 AM.

## 2018-12-04 NOTE — TELEPHONE ENCOUNTER
Doron came fasting to lab this morning for labs in his 'my chart'. Please place orders for him in Epic.  Thanks     Lab

## 2018-12-04 NOTE — TELEPHONE ENCOUNTER
Reason for Call:  Patient is calling in to schedule colonoscopy    Detailed comments: please place orders    Phone Number Patient can be reached at: Home number on file [phone] (home)    Best Time: NA    Can we leave a detailed message on this number? Not Applicable    Call taken on 12/4/2018 at 10:28 AM by Julienne Van

## 2018-12-05 NOTE — TELEPHONE ENCOUNTER
Ordered colonoscopy,  Please tell him how to schedule and mail the information to him for the miralax prep.  Fahad Fisher MD  Family Medicine

## 2018-12-12 ENCOUNTER — OFFICE VISIT (OUTPATIENT)
Dept: UROLOGY | Facility: OTHER | Age: 63
End: 2018-12-12
Payer: COMMERCIAL

## 2018-12-12 VITALS
SYSTOLIC BLOOD PRESSURE: 166 MMHG | TEMPERATURE: 97.7 F | DIASTOLIC BLOOD PRESSURE: 87 MMHG | HEART RATE: 58 BPM | WEIGHT: 238 LBS | BODY MASS INDEX: 33.19 KG/M2

## 2018-12-12 DIAGNOSIS — R97.20 ELEVATED PSA: Primary | ICD-10-CM

## 2018-12-12 DIAGNOSIS — N13.8 BPH WITH URINARY OBSTRUCTION: ICD-10-CM

## 2018-12-12 DIAGNOSIS — N40.1 BPH WITH URINARY OBSTRUCTION: ICD-10-CM

## 2018-12-12 DIAGNOSIS — I10 HYPERTENSION, UNSPECIFIED TYPE: ICD-10-CM

## 2018-12-12 DIAGNOSIS — N52.9 ERECTILE DYSFUNCTION, UNSPECIFIED ERECTILE DYSFUNCTION TYPE: ICD-10-CM

## 2018-12-12 PROCEDURE — 51798 US URINE CAPACITY MEASURE: CPT | Performed by: UROLOGY

## 2018-12-12 PROCEDURE — 99213 OFFICE O/P EST LOW 20 MIN: CPT | Mod: 25 | Performed by: UROLOGY

## 2018-12-12 RX ORDER — FINASTERIDE 5 MG/1
5 TABLET, FILM COATED ORAL DAILY
Qty: 90 TABLET | Refills: 3 | Status: SHIPPED | OUTPATIENT
Start: 2018-12-12 | End: 2019-12-24

## 2018-12-12 RX ORDER — TAMSULOSIN HYDROCHLORIDE 0.4 MG/1
0.4 CAPSULE ORAL AT BEDTIME
Qty: 90 CAPSULE | Refills: 3 | Status: SHIPPED | OUTPATIENT
Start: 2018-12-12 | End: 2019-05-16

## 2018-12-12 ASSESSMENT — PAIN SCALES - GENERAL: PAINLEVEL: NO PAIN (0)

## 2018-12-12 NOTE — NURSING NOTE
Bladder scan performed. Maximum post void residual after 3 scans was 58 ml. MD was informed.    Shirley Brown CMA

## 2018-12-12 NOTE — PROGRESS NOTES
Chief Complaint   Patient presents with     Medication Refill       Doron Beltran is a 62 year old male who presents today for follow up of   Chief Complaint   Patient presents with     Medication Refill   Doron Beltran is a pleasant 62-year-old gentleman who presents to clinic today for consultation with regard to several urological issues.  The patient is a patient of Dr. Gonzales in the past.  He has history of elevated PSA, BPH and erectile dysfunction.  The patient has had 2 previous prostate biopsies for elevated PSA.  The last one was in 2015 by Dr. Gonzales.  He also had an MRI of the prostate that did not show any obvious nodule.  His most recent PSA is 4.7.  Since he is on finasteride his actual PSA is 9.4.  He was  also on flomax. He is on viagra for ED.    Current Outpatient Medications   Medication Sig Dispense Refill     amLODIPine (NORVASC) 2.5 MG tablet Take 1 tablet (2.5 mg) by mouth 2 times daily 180 tablet 3     aspirin 81 MG tablet Take 81 mg by mouth daily       atorvastatin (LIPITOR) 40 MG tablet Take 1 tablet (40 mg) by mouth daily 90 tablet 0     carvedilol (COREG) 25 MG tablet Take 1 tablet (25 mg) by mouth 2 times daily (with meals) 180 tablet 0     finasteride (PROSCAR) 5 MG tablet Take 1 tablet (5 mg) by mouth daily 90 tablet 3     lisinopril (PRINIVIL/ZESTRIL) 20 MG tablet Take 1 tablet (20 mg) by mouth 2 times daily 180 tablet 0     NEW MED CO-Q 10 taking daily.       sildenafil (VIAGRA) 100 MG cap/tab Take 1 tablet (100 mg) by mouth daily as needed for erectile dysfunction 60 tablet 3     tamsulosin (FLOMAX) 0.4 MG capsule Take 1 capsule (0.4 mg) by mouth At Bedtime 90 capsule 3     tamsulosin (FLOMAX) 0.4 MG capsule Take 1 capsule (0.4 mg) by mouth At Bedtime 90 capsule 0     typhoid (VIVOTIF) CR capsule Take 1 capsule by mouth every other day 4 capsule 0     Allergies   Allergen Reactions     Nka [No Known Allergies]       Past Medical History:   Diagnosis Date     Atrial fibrillation  (H) 9/23/10     CARDIAC DYSRHYTHMIAS NEC 4/3/2008     Cardiomyopathy, idiopathic (H)      Coronary artery disease     Stent     Elevated PSA 10/7/2010     HTN (hypertension) 4/25/2011     Hyperlipidemia      Hypertension      TYLER (obstructive sleep apnea)     CPAP     Shortness of breath      Past Surgical History:   Procedure Laterality Date     CARDIAC CATHERIZATION  2011    mid RCA stenosis of 85-90%-BMS     H ABLATION FOCAL AFIB  3/27/14     ORTHOPEDIC SURGERY      knee- both arthrocsopic     ORTHOPEDIC SURGERY      left pinky finger     REMOVE FOREIGN BODY FINGER Left 2/7/2017    Procedure: REMOVE FOREIGN BODY FINGER;  Surgeon: Adilene Mcdonald MD;  Location: [facility] OR     SURGICAL HISTORY OF -       right knee arthoscopic     SURGICAL HISTORY OF -       left 5th finger surgery     Family History   Problem Relation Age of Onset     Diabetes Father      Cerebrovascular Disease Father      Unknown/Adopted Maternal Grandfather      Unknown/Adopted Paternal Grandmother      Cancer Paternal Grandfather         unknown     Cardiovascular Mother         had pacemaker placed unknown reason why     Social History     Social History     Marital status:      Spouse name: N/A     Number of children: N/A     Years of education: N/A     Social History Main Topics     Smoking status: Never Smoker     Smokeless tobacco: Never Used     Alcohol use Yes      Comment: 2-3 drinks per week     Drug use: No     Sexual activity: Yes     Partners: Female     Other Topics Concern     Parent/Sibling W/ Cabg, Mi Or Angioplasty Before 65f 55m? No     Caffeine Concern Yes     2 cups caffeine per day     Sleep Concern Yes     sleep apnea, wears cpap at night     Stress Concern No     Weight Concern No     Special Diet No     Back Care No     Exercise Yes     knee exercises, weights     Social History Narrative       REVIEW OF SYSTEMS  =================  C: NEGATIVE for fever, chills, change in weight  I: NEGATIVE for worrisome  rashes, moles or lesions  E/M: NEGATIVE for ear, mouth and throat problems  R: NEGATIVE for significant cough or SHORTNESS OF BREATH,   CV: NEGATIVE for chest pain, palpitations or peripheral edema  GI: NEGATIVE for nausea, abdominal pain, heartburn, or change in bowel habits  NEURO: NEGATIVE any motor/sensory changes  PSYCH: NEGATIVE for recent mood disorder    Physical Exam:  /87   Pulse 58   Temp 97.7  F (36.5  C) (Oral)   Wt 108 kg (238 lb)   BMI 33.19 kg/m     Patient is pleasant, in no acute distress, good general condition.  Lung: no evidence of respiratory distress    Abdomen: Soft, nondistended, non tender. No masses. No rebound or guarding.   Exam: penis no d/c. Testis no masses.  No scrotal skin lesion. Prostate smooth no nodule.  Non tender  Skin: Warm and dry.  No redness.  Psych: normal mood and affect  Neuro: alert and oriented    Assessment/Plan:   (N40.1) Benign prostatic hyperplasia with lower urinary tract symptoms, symptom details unspecified  (primary encounter diagnosis)  Comment: bladder scan < 100 ml  Plan: cont with flomax/proscar    (R97.20) Elevated prostate specific antigen (PSA)  Comment: stable  Plan: PSA, tumor marker        In six months    ED: cont with viagra    HTN: Patient to follow up with Primary Care provider regarding elevated blood pressure.

## 2018-12-13 DIAGNOSIS — I10 HTN (HYPERTENSION): ICD-10-CM

## 2018-12-13 DIAGNOSIS — I48.19 PERSISTENT ATRIAL FIBRILLATION (H): ICD-10-CM

## 2018-12-13 RX ORDER — AMLODIPINE BESYLATE 2.5 MG/1
2.5 TABLET ORAL 2 TIMES DAILY
Qty: 180 TABLET | Refills: 1 | Status: SHIPPED | OUTPATIENT
Start: 2018-12-13 | End: 2019-07-11

## 2018-12-13 RX ORDER — CARVEDILOL 25 MG/1
25 TABLET ORAL 2 TIMES DAILY WITH MEALS
Qty: 180 TABLET | Refills: 2 | Status: SHIPPED | OUTPATIENT
Start: 2018-12-13 | End: 2019-10-25

## 2018-12-19 ENCOUNTER — TRANSFERRED RECORDS (OUTPATIENT)
Dept: HEALTH INFORMATION MANAGEMENT | Facility: CLINIC | Age: 63
End: 2018-12-19

## 2019-03-04 DIAGNOSIS — I10 HTN (HYPERTENSION): ICD-10-CM

## 2019-03-04 DIAGNOSIS — E78.5 HYPERLIPIDEMIA: ICD-10-CM

## 2019-03-04 RX ORDER — ATORVASTATIN CALCIUM 40 MG/1
40 TABLET, FILM COATED ORAL DAILY
Qty: 90 TABLET | Refills: 2 | Status: SHIPPED | OUTPATIENT
Start: 2019-03-04 | End: 2019-12-23

## 2019-03-04 RX ORDER — LISINOPRIL 20 MG/1
20 TABLET ORAL 2 TIMES DAILY
Qty: 180 TABLET | Refills: 2 | Status: SHIPPED | OUTPATIENT
Start: 2019-03-04 | End: 2019-12-23

## 2019-04-11 ENCOUNTER — OFFICE VISIT (OUTPATIENT)
Dept: FAMILY MEDICINE | Facility: CLINIC | Age: 64
End: 2019-04-11
Payer: COMMERCIAL

## 2019-04-11 VITALS
OXYGEN SATURATION: 96 % | TEMPERATURE: 98 F | DIASTOLIC BLOOD PRESSURE: 82 MMHG | BODY MASS INDEX: 32.76 KG/M2 | RESPIRATION RATE: 16 BRPM | WEIGHT: 234 LBS | HEIGHT: 71 IN | HEART RATE: 60 BPM | SYSTOLIC BLOOD PRESSURE: 130 MMHG

## 2019-04-11 DIAGNOSIS — H61.23 BILATERAL IMPACTED CERUMEN: Primary | ICD-10-CM

## 2019-04-11 DIAGNOSIS — Z12.11 SPECIAL SCREENING FOR MALIGNANT NEOPLASMS, COLON: ICD-10-CM

## 2019-04-11 PROCEDURE — 99213 OFFICE O/P EST LOW 20 MIN: CPT | Performed by: FAMILY MEDICINE

## 2019-04-11 ASSESSMENT — MIFFLIN-ST. JEOR: SCORE: 1878.55

## 2019-04-11 NOTE — PATIENT INSTRUCTIONS
Thank you for choosing [facility].  You may be receiving an email and/or telephone survey request from [facility] [employer] regarding your visit today.  Please take a few minutes to respond to the survey to let us know how we are doing.      If you have questions or concerns, please contact us via Turn or you can contact your care team at [phone].    Our Clinic hours are:  Monday 6:40 am  to 7:00 pm  Tuesday -Friday 6:40 am to 5:00 pm    The [place] outpatient lab hours are:  Monday - Friday 6:10 am to 4:45 pm  Saturdays 7:00 am to 11:00 am  Appointments are required, call [phone]    If you have clinical questions after hours or would like to schedule an appointment,  call the clinic at [phone].    (H61.23) Bilateral impacted cerumen  (primary encounter diagnosis)  Comment:   Plan: REMOVE IMPACTED CERUMEN        We discussed the issues and avoid Q-tips in the ear canal.   Consider prevention with the OTC wax dissolving meds. Follow up as needed.     (Z12.11) Special screening for malignant neoplasms, colon  Comment:   Plan: GASTROENTEROLOGY ADULT REF PROCEDURE ONLY [facility] [phone]        Call [phone] to schedule and avoid aspirin for one week before.

## 2019-04-11 NOTE — PROGRESS NOTES
SUBJECTIVE:   Doron Feldman is a 63 year old male who presents to clinic today for the following   health issues:        EARS ARE PLUGGED      Duration: Started yesterday.    Description (location/character/radiation): Bilateral ears-plugged feeling.  He does use Q-tips daily for his ears.    They were to leave for [place] today.  That was cancelled and they will leave on Sunday.    Intensity:  mild, moderate    Accompanying signs and symptoms: No pain or drainage from the ears, just feels it is wax buildup.     History (similar episodes/previous evaluation): History of having wax removed.    Precipitating or alleviating factors: He thought they were better last night after treatment.  Plugged again this am.    Therapies tried and outcome: Has been using wax removal drops, Peroxide and an ear candle.          Current Outpatient Medications:      amLODIPine (NORVASC) 2.5 MG tablet, Take 1 tablet (2.5 mg) by mouth 2 times daily, Disp: 180 tablet, Rfl: 1     aspirin 81 MG tablet, Take 81 mg by mouth daily, Disp: , Rfl:      atorvastatin (LIPITOR) 40 MG tablet, Take 1 tablet (40 mg) by mouth daily, Disp: 90 tablet, Rfl: 2     carvedilol (COREG) 25 MG tablet, Take 1 tablet (25 mg) by mouth 2 times daily (with meals), Disp: 180 tablet, Rfl: 2     finasteride (PROSCAR) 5 MG tablet, Take 1 tablet (5 mg) by mouth daily, Disp: 90 tablet, Rfl: 3     lisinopril (PRINIVIL/ZESTRIL) 20 MG tablet, Take 1 tablet (20 mg) by mouth 2 times daily, Disp: 180 tablet, Rfl: 2     NEW MED, CO-Q 10 taking daily., Disp: , Rfl:      sildenafil (VIAGRA) 100 MG cap/tab, Take 1 tablet (100 mg) by mouth daily as needed for erectile dysfunction, Disp: 60 tablet, Rfl: 3     tamsulosin (FLOMAX) 0.4 MG capsule, Take 1 capsule (0.4 mg) by mouth At Bedtime, Disp: 90 capsule, Rfl: 3     typhoid (VIVOTIF) CR capsule, Take 1 capsule by mouth every other day, Disp: 4 capsule, Rfl: 0    Patient Active Problem List   Diagnosis     Other  "specified cardiac dysrhythmias(427.89)     Elevated PSA     CARDIOVASCULAR SCREENING; LDL GOAL LESS THAN 130     Health Care Home     HTN (hypertension)     Cardiomyopathy (H)     Atrial fibrillation (H)     Hyperlipidemia     TYLER (obstructive sleep apnea)     Hypertension       Blood pressure 130/82, pulse 60, temperature 98  F (36.7  C), temperature source Tympanic, resp. rate 16, height 1.803 m (5' 11\"), weight 106.1 kg (234 lb), SpO2 96 %.    Exam:  GENERAL APPEARANCE: healthy, alert and no distress  EYES: EOMI,  PERRL  HENT: cerumen bilateral with 100% occlusion.   NECK: no adenopathy, no asymmetry, masses, or scars and thyroid normal to palpation  PSYCH: mentation appears normal and affect normal/bright    (H61.23) Bilateral impacted cerumen  (primary encounter diagnosis)  Comment:   Plan: REMOVE IMPACTED CERUMEN        We discussed the issues and avoid Q-tips in the ear canal.   Consider prevention with the OTC wax dissolving meds. Follow up as needed.     (Z12.11) Special screening for malignant neoplasms, colon  Comment:   Plan: GASTROENTEROLOGY ADULT REF PROCEDURE ONLY [facility] [phone]        Call [phone] to schedule and avoid aspirin for one week before.       Quentin Tanner            "

## 2019-05-14 DIAGNOSIS — N40.1 BPH WITH URINARY OBSTRUCTION: ICD-10-CM

## 2019-05-14 DIAGNOSIS — N13.8 BPH WITH URINARY OBSTRUCTION: ICD-10-CM

## 2019-05-16 RX ORDER — TAMSULOSIN HYDROCHLORIDE 0.4 MG/1
0.4 CAPSULE ORAL AT BEDTIME
Qty: 90 CAPSULE | Refills: 3 | Status: SHIPPED | OUTPATIENT
Start: 2019-05-16 | End: 2020-02-25

## 2019-05-16 NOTE — TELEPHONE ENCOUNTER
"Requested Prescriptions   Pending Prescriptions Disp Refills     tamsulosin (FLOMAX) 0.4 MG capsule 90 capsule 3     Sig: Take 1 capsule (0.4 mg) by mouth At Bedtime       Alpha Blockers Failed - 5/14/2019  3:28 PM        Failed - Patient does not have Tadalafil, Vardenafil, or Sildenafil on their medication list        Passed - Blood pressure under 140/90 in past 12 months     BP Readings from Last 3 Encounters:   04/11/19 130/82   12/12/18 166/87   10/30/18 136/80                 Passed - Recent (12 mo) or future (30 days) visit within the authorizing provider's specialty     Patient had office visit in the last 12 months or has a visit in the next 30 days with authorizing provider or within the authorizing provider's specialty.  See \"Patient Info\" tab in inbasket, or \"Choose Columns\" in Meds & Orders section of the refill encounter.              Passed - Medication is active on med list        Passed - Patient is 18 years of age or older        Routing refill request to provider for review/approval because:  Patient has sildenafil on med list.     Kyleigh Vvias RN          "

## 2019-06-04 ENCOUNTER — ANESTHESIA EVENT (OUTPATIENT)
Dept: GASTROENTEROLOGY | Facility: CLINIC | Age: 64
End: 2019-06-04
Payer: COMMERCIAL

## 2019-06-04 ASSESSMENT — ENCOUNTER SYMPTOMS: DYSRHYTHMIAS: 1

## 2019-06-04 NOTE — ANESTHESIA PREPROCEDURE EVALUATION
Anesthesia Pre-Procedure Evaluation    Patient: Doron Feldman   MRN: [id number] : 1955          Preoperative Diagnosis: screening    Procedure(s):  COLONOSCOPY    Past Medical History:   Diagnosis Date     Atrial fibrillation (H) 9/23/10     CARDIAC DYSRHYTHMIAS NEC 4/3/2008     Cardiomyopathy, idiopathic (H)      Coronary artery disease     Stent     Elevated PSA 10/7/2010     HTN (hypertension) 2011     Hyperlipidemia      Hypertension      TYLER (obstructive sleep apnea)     CPAP     Shortness of breath      Past Surgical History:   Procedure Laterality Date     CARDIAC CATHERIZATION      mid RCA stenosis of 85-90%-BMS     H ABLATION FOCAL AFIB  3/27/14     ORTHOPEDIC SURGERY      knee- both arthrocsopic     ORTHOPEDIC SURGERY      left pinky finger     REMOVE FOREIGN BODY FINGER Left 2017    Procedure: REMOVE FOREIGN BODY FINGER;  Surgeon: Adilene Mcdonald MD;  Location: [facility]     SURGICAL HISTORY OF -       right knee arthoscopic     SURGICAL HISTORY OF -       left 5th finger surgery       Anesthesia Evaluation     . Pt has had prior anesthetic. Type: General and MAC           ROS/MED HX    ENT/Pulmonary:     (+)sleep apnea, , . .    Neurologic:       Cardiovascular: Comment: idiopathic cardiomyopathy    (+) Dyslipidemia, hypertension--CAD, --. : . . SWANSON, . :. dysrhythmias a-fib, . Previous cardiac testing Echodate:-results:Interpretation Summary     The visual ejection fraction is estimated at 55%.  Left ventricular systolic function is normal.  There are regional wall motion abnormalities as specified.  The study was technically difficult.Stress Testdate: results:Impression  1. Myocardial perfusion imaging using single isotope technique  demonstrated probable substantial diaphragmatic attenuation artifact.  There is a small fixed anteroseptal defect at mid ventricle which may  be consistent with prior nontransmural infarct without significant  associated ischemia versus  soft tissue attenuation artifact. No  significant ischemia is seen. However, this is a nondiagnostic study  on the basis of inadequate heart rate achieved (71%)  2. Gated images demonstrated normal wall motion and thickening. The  left ventricular systolic function is 52% at rest and 60% post stress.  3. Compared to the prior study from 5/2/2014, prior study showed  similar findings. With the exception of systolic function being  described as 38% at rest .ECG reviewed date: results:Sinus Bradycardia  w first degree A-V block     BORDERLINE date: results:          METS/Exercise Tolerance:     Hematologic:         Musculoskeletal:         GI/Hepatic:         Renal/Genitourinary:     (+) BPH,       Endo:     (+) Obesity, .      Psychiatric:         Infectious Disease:         Malignancy:         Other:                          Physical Exam  Normal systems: cardiovascular, pulmonary and dental    Airway   Mallampati: I  TM distance: >3 FB  Neck ROM: full    Dental     Cardiovascular   Rhythm and rate: regular and normal      Pulmonary    breath sounds clear to auscultation            Lab Results   Component Value Date    WBC 6.4 02/03/2017    HGB 15.2 02/03/2017    HCT 45.5 02/03/2017     02/03/2017     09/18/2017    POTASSIUM 3.9 09/18/2017    CHLORIDE 105 09/18/2017    CO2 24 09/18/2017    BUN 26 09/18/2017    CR 0.86 09/18/2017     (H) 09/18/2017    ELROY 9.3 09/18/2017    MAG 1.8 12/09/2010    ALBUMIN 4.5 09/24/2010    PROTTOTAL 7.5 09/24/2010    ALT 25 12/02/2011    AST 35 09/24/2010    ALKPHOS 47 09/24/2010    BILITOTAL 0.5 09/24/2010    PTT 28 03/22/2011    INR 2.02 (H) 03/27/2015    TSH 1.28 04/03/2008       Preop Vitals  BP Readings from Last 3 Encounters:   04/11/19 130/82   12/12/18 166/87   10/30/18 136/80    Pulse Readings from Last 3 Encounters:   04/11/19 60   12/12/18 58   10/30/18 60      Resp Readings from Last 3 Encounters:   04/11/19 16   10/12/17 12   05/22/17 16    SpO2  "Readings from Last 3 Encounters:   04/11/19 96%   10/31/17 97%   02/07/17 96%      Temp Readings from Last 1 Encounters:   04/11/19 36.7  C (98  F) (Tympanic)    Ht Readings from Last 1 Encounters:   04/11/19 1.803 m (5' 11\")      Wt Readings from Last 1 Encounters:   04/11/19 106.1 kg (234 lb)    Estimated body mass index is 32.64 kg/m  as calculated from the following:    Height as of 4/11/19: 1.803 m (5' 11\").    Weight as of 4/11/19: 106.1 kg (234 lb).       Anesthesia Plan      History & Physical Review  History and physical reviewed and following examination; no interval change.    ASA Status:  2 .    NPO Status:  > 6 hours    Plan for MAC Reason for MAC:  Deep or markedly invasive procedure (G8)         Postoperative Care      Consents  Anesthetic plan, risks, benefits and alternatives discussed with:  Patient..                 PEPE Ribeiro CRNA  "

## 2019-06-05 ENCOUNTER — HOSPITAL ENCOUNTER (OUTPATIENT)
Facility: CLINIC | Age: 64
Discharge: HOME | End: 2019-06-05
Attending: SURGERY | Admitting: SURGERY
Payer: COMMERCIAL

## 2019-06-05 ENCOUNTER — ANESTHESIA (OUTPATIENT)
Dept: GASTROENTEROLOGY | Facility: CLINIC | Age: 64
End: 2019-06-05
Payer: COMMERCIAL

## 2019-06-05 VITALS
SYSTOLIC BLOOD PRESSURE: 133 MMHG | BODY MASS INDEX: 32.76 KG/M2 | TEMPERATURE: 98.5 F | HEART RATE: 56 BPM | OXYGEN SATURATION: 96 % | HEIGHT: 71 IN | DIASTOLIC BLOOD PRESSURE: 72 MMHG | WEIGHT: 234 LBS | RESPIRATION RATE: 16 BRPM

## 2019-06-05 LAB — COLONOSCOPY: NORMAL

## 2019-06-05 PROCEDURE — G0121 COLON CA SCRN NOT HI RSK IND: HCPCS | Performed by: SURGERY

## 2019-06-05 PROCEDURE — 37000008 ZZH ANESTHESIA TECHNICAL FEE, 1ST 30 MIN: Performed by: SURGERY

## 2019-06-05 PROCEDURE — 25000125 ZZHC RX 250: Performed by: SURGERY

## 2019-06-05 PROCEDURE — 25800030 ZZH RX IP 258 OP 636: Performed by: SURGERY

## 2019-06-05 PROCEDURE — 45378 DIAGNOSTIC COLONOSCOPY: CPT | Performed by: SURGERY

## 2019-06-05 PROCEDURE — 25000128 H RX IP 250 OP 636: Performed by: NURSE ANESTHETIST, CERTIFIED REGISTERED

## 2019-06-05 RX ORDER — SODIUM CHLORIDE, SODIUM LACTATE, POTASSIUM CHLORIDE, CALCIUM CHLORIDE 600; 310; 30; 20 MG/100ML; MG/100ML; MG/100ML; MG/100ML
INJECTION, SOLUTION INTRAVENOUS CONTINUOUS
Status: DISCONTINUED | OUTPATIENT
Start: 2019-06-05 | End: 2019-06-05 | Stop reason: HOSPADM

## 2019-06-05 RX ORDER — NALOXONE HYDROCHLORIDE 0.4 MG/ML
.1-.4 INJECTION, SOLUTION INTRAMUSCULAR; INTRAVENOUS; SUBCUTANEOUS
Status: DISCONTINUED | OUTPATIENT
Start: 2019-06-05 | End: 2019-06-05 | Stop reason: HOSPADM

## 2019-06-05 RX ORDER — PROPOFOL 10 MG/ML
INJECTION, EMULSION INTRAVENOUS PRN
Status: DISCONTINUED | OUTPATIENT
Start: 2019-06-05 | End: 2019-06-05

## 2019-06-05 RX ORDER — ONDANSETRON 2 MG/ML
4 INJECTION INTRAMUSCULAR; INTRAVENOUS
Status: DISCONTINUED | OUTPATIENT
Start: 2019-06-05 | End: 2019-06-05 | Stop reason: HOSPADM

## 2019-06-05 RX ORDER — FLUMAZENIL 0.1 MG/ML
0.2 INJECTION, SOLUTION INTRAVENOUS
Status: DISCONTINUED | OUTPATIENT
Start: 2019-06-05 | End: 2019-06-05 | Stop reason: HOSPADM

## 2019-06-05 RX ORDER — LIDOCAINE 40 MG/G
CREAM TOPICAL
Status: DISCONTINUED | OUTPATIENT
Start: 2019-06-05 | End: 2019-06-05 | Stop reason: HOSPADM

## 2019-06-05 RX ADMIN — SODIUM CHLORIDE, POTASSIUM CHLORIDE, SODIUM LACTATE AND CALCIUM CHLORIDE: 600; 310; 30; 20 INJECTION, SOLUTION INTRAVENOUS at 08:13

## 2019-06-05 RX ADMIN — PROPOFOL 100 MG: 10 INJECTION, EMULSION INTRAVENOUS at 09:19

## 2019-06-05 RX ADMIN — PROPOFOL 150 MG: 10 INJECTION, EMULSION INTRAVENOUS at 09:16

## 2019-06-05 RX ADMIN — PROPOFOL 150 MG: 10 INJECTION, EMULSION INTRAVENOUS at 09:17

## 2019-06-05 RX ADMIN — LIDOCAINE HYDROCHLORIDE: 10 INJECTION, SOLUTION EPIDURAL; INFILTRATION; INTRACAUDAL; PERINEURAL at 08:13

## 2019-06-05 RX ADMIN — PROPOFOL 150 MG: 10 INJECTION, EMULSION INTRAVENOUS at 09:18

## 2019-06-05 ASSESSMENT — MIFFLIN-ST. JEOR: SCORE: 1878.55

## 2019-06-05 NOTE — ANESTHESIA POSTPROCEDURE EVALUATION
Patient: Doron Feldman    Procedure(s):  COLONOSCOPY    Diagnosis:screening  Diagnosis Additional Information: No value filed.    Anesthesia Type:  MAC    Note:  Anesthesia Post Evaluation    Patient location during evaluation: Bedside  Patient participation: Able to fully participate in evaluation  Level of consciousness: awake and alert  Pain management: adequate  Airway patency: patent  Cardiovascular status: acceptable  Respiratory status: acceptable  Hydration status: acceptable  PONV: none     Anesthetic complications: None          Last vitals:  Vitals:    06/05/19 0753   BP: 129/77   Resp: 18   Temp: 36.9  C (98.5  F)   SpO2: 100%         Electronically Signed By: PEPE Cedillo CRNA  June 5, 2019  9:29 AM

## 2019-06-05 NOTE — H&P
63 year old year old male here for colonoscopy for screening.  Last colonoscopy was 2008, negative. .  Patient denies blood in stool or change in stool caliber.  There is no known family history of CRC or polyps.    Patient Active Problem List   Diagnosis     Other specified cardiac dysrhythmias(427.89)     Elevated PSA     CARDIOVASCULAR SCREENING; LDL GOAL LESS THAN 130     Health Care Home     HTN (hypertension)     Cardiomyopathy (H)     Atrial fibrillation (H)     Hyperlipidemia     TYLER (obstructive sleep apnea)     Hypertension       Past Medical History:   Diagnosis Date     Atrial fibrillation (H) 9/23/10     CARDIAC DYSRHYTHMIAS NEC 4/3/2008     Cardiomyopathy, idiopathic (H)      Coronary artery disease     Stent     Elevated PSA 10/7/2010     HTN (hypertension) 4/25/2011     Hyperlipidemia      Hypertension      TYLER (obstructive sleep apnea)     CPAP     Shortness of breath        Past Surgical History:   Procedure Laterality Date     CARDIAC CATHERIZATION  2011    mid RCA stenosis of 85-90%-BMS     H ABLATION FOCAL AFIB  3/27/14     ORTHOPEDIC SURGERY      knee- both arthrocsopic     ORTHOPEDIC SURGERY      left pinky finger     REMOVE FOREIGN BODY FINGER Left 2/7/2017    Procedure: REMOVE FOREIGN BODY FINGER;  Surgeon: Adilene Mcdonald MD;  Location: [facility]     SURGICAL HISTORY OF -       right knee arthoscopic     SURGICAL HISTORY OF -       left 5th finger surgery       Family History   Problem Relation Age of Onset     Diabetes Father      Cerebrovascular Disease Father      Unknown/Adopted Maternal Grandfather      Unknown/Adopted Paternal Grandmother      Cancer Paternal Grandfather         unknown     Cardiovascular Mother         had pacemaker placed unknown reason why       No current outpatient medications on file.       Allergies   Allergen Reactions     Nka [No Known Allergies]        Pt reports that he has never smoked. He has never used smokeless tobacco. He reports that he drinks  "alcohol. He reports that he does not use drugs.    Exam:  /77   Temp 98.5  F (36.9  C) (Oral)   Resp 18   Ht 1.803 m (5' 11\")   Wt 106.1 kg (234 lb)   SpO2 100%   BMI 32.64 kg/m      Awake, Alert OX3  Lungs - CTA bilaterally  CV - RRR, no murmurs, distal pulses intact  Abd - soft, non-distended, non-tender, +BS  Extr - No cyanosis or edema    A/P 63 year old year old male in need of colonoscopy for screening. Risks, benefits, alternatives, and complications were discussed including the possibility of perforation, bleeding, missed lesion and the patient agreed to proceed    Kevin Andujar,  on 6/5/2019 at 9:05 AM    "

## 2019-06-05 NOTE — ANESTHESIA CARE TRANSFER NOTE
Patient: Doron Feldman    Procedure(s):  COLONOSCOPY    Diagnosis: screening  Diagnosis Additional Information: No value filed.    Anesthesia Type:   MAC     Note:  Airway :Room Air  Patient transferred to:Phase II  Handoff Report: Identifed the Patient, Identified the Reponsible Provider, Reviewed the pertinent medical history, Discussed the surgical course, Reviewed Intra-OP anesthesia mangement and issues during anesthesia, Set expectations for post-procedure period and Allowed opportunity for questions and acknowledgement of understanding      Vitals: (Last set prior to Anesthesia Care Transfer)    CRNA VITALS  6/5/2019 0859 - 6/5/2019 0929      6/5/2019             Pulse:  55    Ht Rate:  55    SpO2:  95 %                Electronically Signed By: PEPE Cedillo CRNA  June 5, 2019  9:29 AM

## 2019-07-11 DIAGNOSIS — I48.19 PERSISTENT ATRIAL FIBRILLATION (H): ICD-10-CM

## 2019-07-11 RX ORDER — AMLODIPINE BESYLATE 2.5 MG/1
2.5 TABLET ORAL 2 TIMES DAILY
Qty: 180 TABLET | Refills: 1 | Status: SHIPPED | OUTPATIENT
Start: 2019-07-11 | End: 2019-12-23

## 2019-10-25 DIAGNOSIS — I10 HTN (HYPERTENSION): ICD-10-CM

## 2019-10-25 RX ORDER — CARVEDILOL 25 MG/1
25 TABLET ORAL 2 TIMES DAILY WITH MEALS
Qty: 180 TABLET | Refills: 0 | Status: SHIPPED | OUTPATIENT
Start: 2019-10-25 | End: 2020-02-25

## 2019-12-16 ENCOUNTER — HEALTH MAINTENANCE LETTER (OUTPATIENT)
Age: 64
End: 2019-12-16

## 2019-12-23 DIAGNOSIS — R97.20 ELEVATED PSA: ICD-10-CM

## 2019-12-23 DIAGNOSIS — I10 HTN (HYPERTENSION): ICD-10-CM

## 2019-12-23 DIAGNOSIS — E78.5 HYPERLIPIDEMIA: ICD-10-CM

## 2019-12-23 DIAGNOSIS — I48.19 PERSISTENT ATRIAL FIBRILLATION (H): ICD-10-CM

## 2019-12-23 RX ORDER — LISINOPRIL 20 MG/1
20 TABLET ORAL 2 TIMES DAILY
Qty: 180 TABLET | Refills: 0 | Status: SHIPPED | OUTPATIENT
Start: 2019-12-23 | End: 2020-02-25

## 2019-12-23 RX ORDER — AMLODIPINE BESYLATE 2.5 MG/1
2.5 TABLET ORAL 2 TIMES DAILY
Qty: 180 TABLET | Refills: 0 | Status: SHIPPED | OUTPATIENT
Start: 2019-12-23 | End: 2020-02-25

## 2019-12-23 RX ORDER — ATORVASTATIN CALCIUM 40 MG/1
40 TABLET, FILM COATED ORAL DAILY
Qty: 90 TABLET | Refills: 0 | Status: SHIPPED | OUTPATIENT
Start: 2019-12-23 | End: 2020-02-25

## 2019-12-24 RX ORDER — FINASTERIDE 5 MG/1
5 TABLET, FILM COATED ORAL DAILY
Qty: 30 TABLET | Refills: 0 | Status: SHIPPED | OUTPATIENT
Start: 2019-12-24 | End: 2020-02-25

## 2019-12-24 NOTE — TELEPHONE ENCOUNTER
Signed Prescriptions:                        Disp   Refills    finasteride (PROSCAR) 5 MG tablet          30 tab*0        Sig: Take 1 tablet (5 mg) by mouth daily  Authorizing Provider: HAMIDA BATES  Ordering User: MARLENE GIBBS RN

## 2020-02-24 DIAGNOSIS — E78.49 OTHER HYPERLIPIDEMIA: ICD-10-CM

## 2020-02-24 DIAGNOSIS — R97.20 ELEVATED PSA: ICD-10-CM

## 2020-02-24 LAB — PSA SERPL-MCNC: 3.96 UG/L (ref 0–4)

## 2020-02-24 PROCEDURE — 36415 COLL VENOUS BLD VENIPUNCTURE: CPT | Performed by: UROLOGY

## 2020-02-24 PROCEDURE — 80061 LIPID PANEL: CPT | Performed by: PHYSICIAN ASSISTANT

## 2020-02-24 PROCEDURE — 84153 ASSAY OF PSA TOTAL: CPT | Performed by: UROLOGY

## 2020-02-25 ENCOUNTER — DOCUMENTATION ONLY (OUTPATIENT)
Dept: CARDIOLOGY | Facility: CLINIC | Age: 65
End: 2020-02-25

## 2020-02-25 ENCOUNTER — OFFICE VISIT (OUTPATIENT)
Dept: UROLOGY | Facility: CLINIC | Age: 65
End: 2020-02-25
Payer: COMMERCIAL

## 2020-02-25 ENCOUNTER — OFFICE VISIT (OUTPATIENT)
Dept: CARDIOLOGY | Facility: CLINIC | Age: 65
End: 2020-02-25
Payer: COMMERCIAL

## 2020-02-25 VITALS — OXYGEN SATURATION: 98 % | HEART RATE: 67 BPM | SYSTOLIC BLOOD PRESSURE: 131 MMHG | DIASTOLIC BLOOD PRESSURE: 81 MMHG

## 2020-02-25 VITALS
WEIGHT: 225.1 LBS | HEIGHT: 71 IN | HEART RATE: 60 BPM | BODY MASS INDEX: 31.51 KG/M2 | DIASTOLIC BLOOD PRESSURE: 68 MMHG | SYSTOLIC BLOOD PRESSURE: 124 MMHG

## 2020-02-25 DIAGNOSIS — N13.8 BPH WITH URINARY OBSTRUCTION: ICD-10-CM

## 2020-02-25 DIAGNOSIS — I10 BENIGN ESSENTIAL HTN: ICD-10-CM

## 2020-02-25 DIAGNOSIS — N40.1 BPH WITH URINARY OBSTRUCTION: ICD-10-CM

## 2020-02-25 DIAGNOSIS — E78.49 OTHER HYPERLIPIDEMIA: ICD-10-CM

## 2020-02-25 DIAGNOSIS — E78.49 OTHER HYPERLIPIDEMIA: Primary | ICD-10-CM

## 2020-02-25 DIAGNOSIS — R97.20 ELEVATED PSA: ICD-10-CM

## 2020-02-25 DIAGNOSIS — I48.19 PERSISTENT ATRIAL FIBRILLATION (H): Primary | ICD-10-CM

## 2020-02-25 LAB
CHOLEST SERPL-MCNC: 170 MG/DL
HDLC SERPL-MCNC: 51 MG/DL
LDLC SERPL CALC-MCNC: 105 MG/DL
NONHDLC SERPL-MCNC: 119 MG/DL
TRIGL SERPL-MCNC: 71 MG/DL

## 2020-02-25 PROCEDURE — 99214 OFFICE O/P EST MOD 30 MIN: CPT | Performed by: PHYSICIAN ASSISTANT

## 2020-02-25 PROCEDURE — 99213 OFFICE O/P EST LOW 20 MIN: CPT | Mod: 25 | Performed by: UROLOGY

## 2020-02-25 PROCEDURE — 93000 ELECTROCARDIOGRAM COMPLETE: CPT | Performed by: PHYSICIAN ASSISTANT

## 2020-02-25 PROCEDURE — 51798 US URINE CAPACITY MEASURE: CPT | Performed by: UROLOGY

## 2020-02-25 RX ORDER — FINASTERIDE 5 MG/1
5 TABLET, FILM COATED ORAL DAILY
Qty: 90 TABLET | Refills: 3 | Status: SHIPPED | OUTPATIENT
Start: 2020-02-25 | End: 2021-03-18

## 2020-02-25 RX ORDER — CARVEDILOL 25 MG/1
25 TABLET ORAL 2 TIMES DAILY WITH MEALS
Qty: 180 TABLET | Refills: 3 | Status: SHIPPED | OUTPATIENT
Start: 2020-02-25 | End: 2021-02-03

## 2020-02-25 RX ORDER — TAMSULOSIN HYDROCHLORIDE 0.4 MG/1
0.4 CAPSULE ORAL AT BEDTIME
Qty: 90 CAPSULE | Refills: 3 | Status: SHIPPED | OUTPATIENT
Start: 2020-02-25 | End: 2021-03-18

## 2020-02-25 RX ORDER — AMLODIPINE BESYLATE 2.5 MG/1
2.5 TABLET ORAL 2 TIMES DAILY
Qty: 180 TABLET | Refills: 3 | Status: SHIPPED | OUTPATIENT
Start: 2020-02-25 | End: 2021-02-03

## 2020-02-25 RX ORDER — LISINOPRIL 20 MG/1
20 TABLET ORAL 2 TIMES DAILY
Qty: 180 TABLET | Refills: 0 | Status: SHIPPED | OUTPATIENT
Start: 2020-02-25 | End: 2020-08-11

## 2020-02-25 RX ORDER — ATORVASTATIN CALCIUM 40 MG/1
40 TABLET, FILM COATED ORAL DAILY
Qty: 90 TABLET | Refills: 3 | Status: SHIPPED | OUTPATIENT
Start: 2020-02-25 | End: 2021-02-03

## 2020-02-25 ASSESSMENT — MIFFLIN-ST. JEOR: SCORE: 1833.18

## 2020-02-25 NOTE — PROGRESS NOTES
HPI:   I had the pleasure of seeing Bhavik when he came for follow up of AFib.  This 64 year old sees Dr. Martinez for his history of:    1. Sx'c persistent AFib with failure of sotalol and Multaq, s/p ablation 3/2014  2. Benign Essential HTN  3. CAD s/p RCA stent 2011   4. TYLER on CPAP therapy  5. CHADSVASc 2 (HTN, CAD); he's opted to not be on AC    Dr. Martinez saw Bhavik 10/2018 at which time he was doing well. He had opted not to remain on AC given his CHADSVASc 2 and no recurrent AFib. Chronic AC was recommended, but Bhavik refused and they decided to continue ASA and continue to monitor HR. Due to concerns re: myalgias/arthralgias, 2 week atorvastatin holiday was recommended.     Interval History:  Stopping the atorvastatin didn't seem to improve his joint/muscle aches and restarted his atorvastatin. He's not having any issues with his medications that he's aware of.   He remains very active - building a house and garage in [place] and has had NO limitations. No c/o CP, SOB, edema, orthopnea, palpitations, fever/chills.  NO recurrent AFib. No claudication.    Recent Diagnostic Testing:  EKG today, which I overread, showed SB 59 bpm  Echocardiogam 9/2017 showed EF 55%. Grade 3 diastolic dysfunction. RWMA with mild distal lateral/high-lateral hypokinesis. RV wnl. LA borderline dilated. 1+ MR.   Nuclear Stress Test 11/2017 showed no significant ischemia, while reaching only a 71% MPHR    Assessment & Plan:    1. H/o persistent AFib    S/p catheter ablation 3/2014 and has not had recurrent AFib    EKG as above shows SB    PLAN:    He continues to do well - no changes today    See us 1 year with 7 day ZP prior to assess for silent arrhythias. He can see me or Dr. Martinez ([place] or [facility], wherever is convenient)    Normal EF      2. CAD    S/p stent 2011; stress test 2017 showed no significant ischemia (though non-diagnostic given HR response only to 71% predicted max)    Remains on statin, ACE, BB and ASA    Lipids last checked  2017 showed LDL 74 g/dL    Stopping atorvastatin 40 did not not improve his myalgias and he remains on it    PLAN:    Just had labs done in [place] - I'll see if lipid panel can be added on as he's not fasting today and leaving for [place] later today    3. Benign Essential HTN    BP excellent on current meds    PLAN:    All CV meds refilled      Geneva Bruce PA-C, MSPAS      Orders Placed This Encounter   Procedures     Follow-Up with Cardiac Advanced Practice Provider     EKG 12-lead complete w/read - Clinics (performed today)     Zio Patch Holter Adult Pediatric Greater than 48 hrs     No orders of the defined types were placed in this encounter.    There are no discontinued medications.      Encounter Diagnoses   Name Primary?     Persistent atrial fibrillation Yes     Benign essential HTN      Other hyperlipidemia        CURRENT MEDICATIONS:  Current Outpatient Medications   Medication Sig Dispense Refill     amLODIPine (NORVASC) 2.5 MG tablet Take 1 tablet (2.5 mg) by mouth 2 times daily 180 tablet 0     aspirin 81 MG tablet Take 81 mg by mouth daily       atorvastatin (LIPITOR) 40 MG tablet Take 1 tablet (40 mg) by mouth daily 90 tablet 0     carvedilol (COREG) 25 MG tablet Take 1 tablet (25 mg) by mouth 2 times daily (with meals) 180 tablet 0     finasteride (PROSCAR) 5 MG tablet Take 1 tablet (5 mg) by mouth daily 90 tablet 3     lisinopril (PRINIVIL/ZESTRIL) 20 MG tablet Take 1 tablet (20 mg) by mouth 2 times daily 180 tablet 0     tamsulosin (FLOMAX) 0.4 MG capsule Take 1 capsule (0.4 mg) by mouth At Bedtime 90 capsule 3     NEW MED CO-Q 10 taking daily.       sildenafil (VIAGRA) 100 MG cap/tab Take 1 tablet (100 mg) by mouth daily as needed for erectile dysfunction (Patient not taking: Reported on 2/25/2020) 60 tablet 3       ALLERGIES     Allergies   Allergen Reactions     Nka [No Known Allergies]        PAST MEDICAL HISTORY:  Past Medical History:   Diagnosis Date     Atrial fibrillation (H) 9/23/10      CARDIAC DYSRHYTHMIAS NEC 4/3/2008     Cardiomyopathy, idiopathic (H)      Coronary artery disease     Stent     Elevated PSA 10/7/2010     HTN (hypertension) 4/25/2011     Hyperlipidemia      Hypertension      TYLER (obstructive sleep apnea)     CPAP     Shortness of breath        PAST SURGICAL HISTORY:  Past Surgical History:   Procedure Laterality Date     CARDIAC CATHERIZATION  2011    mid RCA stenosis of 85-90%-BMS     COLONOSCOPY N/A 6/5/2019    Procedure: COLONOSCOPY;  Surgeon: Kevin Andujar DO;  Location: [facility]     H ABLATION FOCAL AFIB  3/27/14     ORTHOPEDIC SURGERY      knee- both arthrocsopic     ORTHOPEDIC SURGERY      left pinky finger     REMOVE FOREIGN BODY FINGER Left 2/7/2017    Procedure: REMOVE FOREIGN BODY FINGER;  Surgeon: Adilene Mcdonald MD;  Location: [facility]     SURGICAL HISTORY OF -       right knee arthoscopic     SURGICAL HISTORY OF -       left 5th finger surgery       FAMILY HISTORY:  Family History   Problem Relation Age of Onset     Diabetes Father      Cerebrovascular Disease Father      Unknown/Adopted Maternal Grandfather      Unknown/Adopted Paternal Grandmother      Cancer Paternal Grandfather         unknown     Cardiovascular Mother         had pacemaker placed unknown reason why       SOCIAL HISTORY:  Social History     Socioeconomic History     Marital status:      Spouse name: None     Number of children: None     Years of education: None     Highest education level: None   Occupational History     None   Social Needs     Financial resource strain: None     Food insecurity:     Worry: None     Inability: None     Transportation needs:     Medical: None     Non-medical: None   Tobacco Use     Smoking status: Never Smoker     Smokeless tobacco: Never Used   Substance and Sexual Activity     Alcohol use: Yes     Comment: 2-3 drinks per week     Drug use: No     Sexual activity: Yes     Partners: Female   Lifestyle     Physical activity:     Days per week:  "None     Minutes per session: None     Stress: None   Relationships     Social connections:     Talks on phone: None     Gets together: None     Attends Mandaeism service: None     Active member of club or organization: None     Attends meetings of clubs or organizations: None     Relationship status: None     Intimate partner violence:     Fear of current or ex partner: None     Emotionally abused: None     Physically abused: None     Forced sexual activity: None   Other Topics Concern     Parent/sibling w/ CABG, MI or angioplasty before 65F 55M? No      Service Not Asked     Blood Transfusions Not Asked     Caffeine Concern Yes     Comment: 2 cups caffeine per day     Occupational Exposure Not Asked     Hobby Hazards Not Asked     Sleep Concern Yes     Comment: sleep apnea, wears cpap at night     Stress Concern No     Weight Concern No     Special Diet No     Back Care No     Exercise Yes     Comment: knee exercises, weights     Bike Helmet Not Asked     Seat Belt Not Asked     Self-Exams Not Asked   Social History Narrative     None       Review of Systems:  Skin:  Negative rash   Eyes:  Positive for    ENT:  Negative    Respiratory:  Positive for sleep apnea;CPAP  Cardiovascular:  Negative for;palpitations;chest pain;edema;dizziness;lightheadedness;fatigue    Gastroenterology: Negative for melena;hematochezia  Genitourinary:  Positive for prostate problem  Musculoskeletal:  Positive for joint pain  Neurologic:  Negative    Psychiatric:  Negative    Heme/Lymph/Imm:  Negative    Endocrine:  Negative      Physical Exam:  Vitals: /68   Pulse 60   Ht 1.803 m (5' 11\")   Wt 102.1 kg (225 lb 1.6 oz)   BMI 31.40 kg/m      Constitutional:  cooperative, alert and oriented, well developed, well nourished, in no acute distress overweight      Skin:  warm and dry to the touch, no apparent skin lesions or masses noted(diffuse erhythema )   facial erythema    Head:  normocephalic, no masses or lesions    "     Eyes:  pupils equal and round;conjunctivae and lids unremarkable;sclera white        ENT:  no pallor or cyanosis, dentition good        Neck:  JVP normal;no carotid bruit        Chest:  normal breath sounds, clear to auscultation, normal A-P diameter, normal symmetry, normal respiratory excursion, no use of accessory muscles        Cardiac: regular rhythm;no murmurs, gallops or rubs detected                  Abdomen:  abdomen soft        Vascular: pulses full and equal                                      Extremities and Back:  no deformities, clubbing, cyanosis, erythema observed;no edema        Neurological:  no gross motor deficits        Recent Lab Results:  LIPID RESULTS:  Lab Results   Component Value Date    CHOL 148 12/20/2017    HDL 38 (L) 12/20/2017    LDL 74 12/20/2017    TRIG 179 (H) 12/20/2017    CHOLHDLRATIO 6.2 (H) 03/27/2015       LIVER ENZYME RESULTS:  Lab Results   Component Value Date    AST 35 09/24/2010    ALT 25 12/02/2011       CBC RESULTS:  Lab Results   Component Value Date    WBC 6.4 02/03/2017    RBC 4.79 02/03/2017    HGB 15.2 02/03/2017    HCT 45.5 02/03/2017    MCV 95 02/03/2017    MCH 31.7 02/03/2017    MCHC 33.4 02/03/2017    RDW 12.3 02/03/2017     02/03/2017       BMP RESULTS:  Lab Results   Component Value Date     09/18/2017    POTASSIUM 3.9 09/18/2017    CHLORIDE 105 09/18/2017    CO2 24 09/18/2017    ANIONGAP 12.9 09/18/2017     (H) 09/18/2017    BUN 26 09/18/2017    CR 0.86 09/18/2017    GFRESTIMATED >90 09/18/2017    GFRESTBLACK >90 09/18/2017    ELROY 9.3 09/18/2017

## 2020-02-25 NOTE — PROGRESS NOTES
Gregoria - as we discussed, pls check with lab to see if fasting lipids (no ALT needed) can be added on to his PSA drawn yesterday in [place] (pls order)    If not, pls order fasting labs in April when he returns from [place].    Jefe Bean    ADDENDUM:    Lipids were added on to today's lab.  GREGORIA MARQUEZ RN on 2/25/2020 at 3:47 PM

## 2020-02-25 NOTE — PATIENT INSTRUCTIONS
1. Glad things are going so well!     2. No changes today. I'll see if they can add cholesterol to yesterday's labs. If not, we'll get them when you come back to [place]    3. See us 1 year but monitor beforehand but CALL if issues prior!!!  [phone]

## 2020-02-25 NOTE — LETTER
2/25/2020    Fahad Fisher MD  [address] [facility] [address]    RE: Doron Feldman       Dear Colleague,    I had the pleasure of seeing Doron SCHWARTZ Francia in the [facility].    HPI:   I had the pleasure of seeing Bhavik when he came for follow up of AFib.  This 64 year old sees Dr. Martinez for his history of:    1. Sx'c persistent AFib with failure of sotalol and Multaq, s/p ablation 3/2014  2. Benign Essential HTN  3. CAD s/p RCA stent 2011   4. TYLER on CPAP therapy  5. CHADSVASc 2 (HTN, CAD); he's opted to not be on AC    Dr. Martinez saw Bhavik 10/2018 at which time he was doing well. He had opted not to remain on AC given his CHADSVASc 2 and no recurrent AFib. Chronic AC was recommended, but Bhavik refused and they decided to continue ASA and continue to monitor HR. Due to concerns re: myalgias/arthralgias, 2 week atorvastatin holiday was recommended.     Interval History:  Stopping the atorvastatin didn't seem to improve his joint/muscle aches and restarted his atorvastatin. He's not having any issues with his medications that he's aware of.   He remains very active - building a house and garage in [place] and has had NO limitations. No c/o CP, SOB, edema, orthopnea, palpitations, fever/chills.  NO recurrent AFib. No claudication.    Recent Diagnostic Testing:  EKG today, which I overread, showed SB 59 bpm  Echocardiogam 9/2017 showed EF 55%. Grade 3 diastolic dysfunction. RWMA with mild distal lateral/high-lateral hypokinesis. RV wnl. LA borderline dilated. 1+ MR.   Nuclear Stress Test 11/2017 showed no significant ischemia, while reaching only a 71% MPHR    Assessment & Plan:    1. H/o persistent AFib    S/p catheter ablation 3/2014 and has not had recurrent AFib    EKG as above shows SB    PLAN:    He continues to do well - no changes today    See us 1 year with 7 day ZP prior to assess for silent arrhythias. He can see me or Dr. Martinez ([place] or [facility], wherever is  convenient)    Normal EF      2. CAD    S/p stent 2011; stress test 2017 showed no significant ischemia (though non-diagnostic given HR response only to 71% predicted max)    Remains on statin, ACE, BB and ASA    Lipids last checked 2017 showed LDL 74 g/dL    Stopping atorvastatin 40 did not not improve his myalgias and he remains on it    PLAN:    Just had labs done in [place] - I'll see if lipid panel can be added on as he's not fasting today and leaving for ID later today    3. Benign Essential HTN    BP excellent on current meds    PLAN:    All CV meds refilled      Geneva Bruce PA-C, MSPAS      Orders Placed This Encounter   Procedures     Follow-Up with Cardiac Advanced Practice Provider     EKG 12-lead complete w/read - Clinics (performed today)     Zio Patch Holter Adult Pediatric Greater than 48 hrs     No orders of the defined types were placed in this encounter.    There are no discontinued medications.      Encounter Diagnoses   Name Primary?     Persistent atrial fibrillation Yes     Benign essential HTN      Other hyperlipidemia        CURRENT MEDICATIONS:  Current Outpatient Medications   Medication Sig Dispense Refill     amLODIPine (NORVASC) 2.5 MG tablet Take 1 tablet (2.5 mg) by mouth 2 times daily 180 tablet 0     aspirin 81 MG tablet Take 81 mg by mouth daily       atorvastatin (LIPITOR) 40 MG tablet Take 1 tablet (40 mg) by mouth daily 90 tablet 0     carvedilol (COREG) 25 MG tablet Take 1 tablet (25 mg) by mouth 2 times daily (with meals) 180 tablet 0     finasteride (PROSCAR) 5 MG tablet Take 1 tablet (5 mg) by mouth daily 90 tablet 3     lisinopril (PRINIVIL/ZESTRIL) 20 MG tablet Take 1 tablet (20 mg) by mouth 2 times daily 180 tablet 0     tamsulosin (FLOMAX) 0.4 MG capsule Take 1 capsule (0.4 mg) by mouth At Bedtime 90 capsule 3     NEW MED CO-Q 10 taking daily.       sildenafil (VIAGRA) 100 MG cap/tab Take 1 tablet (100 mg) by mouth daily as needed for erectile dysfunction (Patient not  taking: Reported on 2/25/2020) 60 tablet 3       ALLERGIES     Allergies   Allergen Reactions     Nka [No Known Allergies]        PAST MEDICAL HISTORY:  Past Medical History:   Diagnosis Date     Atrial fibrillation (H) 9/23/10     CARDIAC DYSRHYTHMIAS NEC 4/3/2008     Cardiomyopathy, idiopathic (H)      Coronary artery disease     Stent     Elevated PSA 10/7/2010     HTN (hypertension) 4/25/2011     Hyperlipidemia      Hypertension      TYLER (obstructive sleep apnea)     CPAP     Shortness of breath        PAST SURGICAL HISTORY:  Past Surgical History:   Procedure Laterality Date     CARDIAC CATHERIZATION  2011    mid RCA stenosis of 85-90%-BMS     COLONOSCOPY N/A 6/5/2019    Procedure: COLONOSCOPY;  Surgeon: Kevin Andujar DO;  Location: [facility]     H ABLATION FOCAL AFIB  3/27/14     ORTHOPEDIC SURGERY      knee- both arthrocsopic     ORTHOPEDIC SURGERY      left pinky finger     REMOVE FOREIGN BODY FINGER Left 2/7/2017    Procedure: REMOVE FOREIGN BODY FINGER;  Surgeon: Adilene Mcdonald MD;  Location: [facility]     SURGICAL HISTORY OF -       right knee arthoscopic     SURGICAL HISTORY OF -       left 5th finger surgery       FAMILY HISTORY:  Family History   Problem Relation Age of Onset     Diabetes Father      Cerebrovascular Disease Father      Unknown/Adopted Maternal Grandfather      Unknown/Adopted Paternal Grandmother      Cancer Paternal Grandfather         unknown     Cardiovascular Mother         had pacemaker placed unknown reason why       SOCIAL HISTORY:  Social History     Socioeconomic History     Marital status:      Spouse name: None     Number of children: None     Years of education: None     Highest education level: None   Occupational History     None   Social Needs     Financial resource strain: None     Food insecurity:     Worry: None     Inability: None     Transportation needs:     Medical: None     Non-medical: None   Tobacco Use     Smoking status: Never Smoker      "Smokeless tobacco: Never Used   Substance and Sexual Activity     Alcohol use: Yes     Comment: 2-3 drinks per week     Drug use: No     Sexual activity: Yes     Partners: Female   Lifestyle     Physical activity:     Days per week: None     Minutes per session: None     Stress: None   Relationships     Social connections:     Talks on phone: None     Gets together: None     Attends Orthodoxy service: None     Active member of club or organization: None     Attends meetings of clubs or organizations: None     Relationship status: None     Intimate partner violence:     Fear of current or ex partner: None     Emotionally abused: None     Physically abused: None     Forced sexual activity: None   Other Topics Concern     Parent/sibling w/ CABG, MI or angioplasty before 65F 55M? No      Service Not Asked     Blood Transfusions Not Asked     Caffeine Concern Yes     Comment: 2 cups caffeine per day     Occupational Exposure Not Asked     Hobby Hazards Not Asked     Sleep Concern Yes     Comment: sleep apnea, wears cpap at night     Stress Concern No     Weight Concern No     Special Diet No     Back Care No     Exercise Yes     Comment: knee exercises, weights     Bike Helmet Not Asked     Seat Belt Not Asked     Self-Exams Not Asked   Social History Narrative     None       Review of Systems:  Skin:  Negative rash   Eyes:  Positive for    ENT:  Negative    Respiratory:  Positive for sleep apnea;CPAP  Cardiovascular:  Negative for;palpitations;chest pain;edema;dizziness;lightheadedness;fatigue    Gastroenterology: Negative for melena;hematochezia  Genitourinary:  Positive for prostate problem  Musculoskeletal:  Positive for joint pain  Neurologic:  Negative    Psychiatric:  Negative    Heme/Lymph/Imm:  Negative    Endocrine:  Negative      Physical Exam:  Vitals: /68   Pulse 60   Ht 1.803 m (5' 11\")   Wt 102.1 kg (225 lb 1.6 oz)   BMI 31.40 kg/m       Constitutional:  cooperative, alert and oriented, " well developed, well nourished, in no acute distress overweight      Skin:  warm and dry to the touch, no apparent skin lesions or masses noted(diffuse erhythema )   facial erythema    Head:  normocephalic, no masses or lesions        Eyes:  pupils equal and round;conjunctivae and lids unremarkable;sclera white        ENT:  no pallor or cyanosis, dentition good        Neck:  JVP normal;no carotid bruit        Chest:  normal breath sounds, clear to auscultation, normal A-P diameter, normal symmetry, normal respiratory excursion, no use of accessory muscles        Cardiac: regular rhythm;no murmurs, gallops or rubs detected                  Abdomen:  abdomen soft        Vascular: pulses full and equal                                      Extremities and Back:  no deformities, clubbing, cyanosis, erythema observed;no edema        Neurological:  no gross motor deficits        Recent Lab Results:  LIPID RESULTS:  Lab Results   Component Value Date    CHOL 148 12/20/2017    HDL 38 (L) 12/20/2017    LDL 74 12/20/2017    TRIG 179 (H) 12/20/2017    CHOLHDLRATIO 6.2 (H) 03/27/2015       LIVER ENZYME RESULTS:  Lab Results   Component Value Date    AST 35 09/24/2010    ALT 25 12/02/2011       CBC RESULTS:  Lab Results   Component Value Date    WBC 6.4 02/03/2017    RBC 4.79 02/03/2017    HGB 15.2 02/03/2017    HCT 45.5 02/03/2017    MCV 95 02/03/2017    MCH 31.7 02/03/2017    MCHC 33.4 02/03/2017    RDW 12.3 02/03/2017     02/03/2017       BMP RESULTS:  Lab Results   Component Value Date     09/18/2017    POTASSIUM 3.9 09/18/2017    CHLORIDE 105 09/18/2017    CO2 24 09/18/2017    ANIONGAP 12.9 09/18/2017     (H) 09/18/2017    BUN 26 09/18/2017    CR 0.86 09/18/2017    GFRESTIMATED >90 09/18/2017    GFRESTBLACK >90 09/18/2017    ELROY 9.3 09/18/2017                Thank you for allowing me to participate in the care of your patient.    Sincerely,     Moriah Bruce PA-C     [facility]  [facility]

## 2020-02-25 NOTE — PROGRESS NOTES
Chief Complaint   Patient presents with     ANJELICADUNCAN Beltran is a 62 year old male who presents today for follow up of   Chief Complaint   Patient presents with     MYLENE Beltran is a pleasant 62-year-old gentleman who presents to clinic today for consultation with regard to several urological issues.  The patient is a patient of Dr. Gonzales in the past.  He has history of elevated PSA, BPH and erectile dysfunction.  The patient has had 2 previous prostate biopsies for elevated PSA.  The last one was in 2015 by Dr. Gonzales.  He also had an MRI of the prostate that did not show any obvious nodule.  His most recent PSA is 4.7.  Since he is on finasteride his actual PSA is 9.4.  He was  also on flomax. He is on viagra for ED.    Current Outpatient Medications   Medication Sig Dispense Refill     amLODIPine (NORVASC) 2.5 MG tablet Take 1 tablet (2.5 mg) by mouth 2 times daily 180 tablet 0     aspirin 81 MG tablet Take 81 mg by mouth daily       atorvastatin (LIPITOR) 40 MG tablet Take 1 tablet (40 mg) by mouth daily 90 tablet 0     carvedilol (COREG) 25 MG tablet Take 1 tablet (25 mg) by mouth 2 times daily (with meals) 180 tablet 0     finasteride (PROSCAR) 5 MG tablet Take 1 tablet (5 mg) by mouth daily 90 tablet 3     lisinopril (PRINIVIL/ZESTRIL) 20 MG tablet Take 1 tablet (20 mg) by mouth 2 times daily 180 tablet 0     NEW MED CO-Q 10 taking daily.       sildenafil (VIAGRA) 100 MG cap/tab Take 1 tablet (100 mg) by mouth daily as needed for erectile dysfunction 60 tablet 3     tamsulosin (FLOMAX) 0.4 MG capsule Take 1 capsule (0.4 mg) by mouth At Bedtime 90 capsule 3     Allergies   Allergen Reactions     Nka [No Known Allergies]       Past Medical History:   Diagnosis Date     Atrial fibrillation (H) 9/23/10     CARDIAC DYSRHYTHMIAS NEC 4/3/2008     Cardiomyopathy, idiopathic (H)      Coronary artery disease     Stent     Elevated PSA 10/7/2010     HTN (hypertension) 4/25/2011     Hyperlipidemia       Hypertension      TYLER (obstructive sleep apnea)     CPAP     Shortness of breath      Past Surgical History:   Procedure Laterality Date     CARDIAC CATHERIZATION  2011    mid RCA stenosis of 85-90%-BMS     COLONOSCOPY N/A 6/5/2019    Procedure: COLONOSCOPY;  Surgeon: Kevin Andujar DO;  Location: [facility]     H ABLATION FOCAL AFIB  3/27/14     ORTHOPEDIC SURGERY      knee- both arthrocsopic     ORTHOPEDIC SURGERY      left pinky finger     REMOVE FOREIGN BODY FINGER Left 2/7/2017    Procedure: REMOVE FOREIGN BODY FINGER;  Surgeon: Adilene Mcdonald MD;  Location: [facility]     SURGICAL HISTORY OF -       right knee arthoscopic     SURGICAL HISTORY OF -       left 5th finger surgery     Family History   Problem Relation Age of Onset     Diabetes Father      Cerebrovascular Disease Father      Unknown/Adopted Maternal Grandfather      Unknown/Adopted Paternal Grandmother      Cancer Paternal Grandfather         unknown     Cardiovascular Mother         had pacemaker placed unknown reason why     Social History     Social History     Marital status:      Spouse name: N/A     Number of children: N/A     Years of education: N/A     Social History Main Topics     Smoking status: Never Smoker     Smokeless tobacco: Never Used     Alcohol use Yes      Comment: 2-3 drinks per week     Drug use: No     Sexual activity: Yes     Partners: Female     Other Topics Concern     Parent/Sibling W/ Cabg, Mi Or Angioplasty Before 65f 55m? No     Caffeine Concern Yes     2 cups caffeine per day     Sleep Concern Yes     sleep apnea, wears cpap at night     Stress Concern No     Weight Concern No     Special Diet No     Back Care No     Exercise Yes     knee exercises, weights     Social History Narrative       REVIEW OF SYSTEMS  =================  C: NEGATIVE for fever, chills, change in weight  I: NEGATIVE for worrisome rashes, moles or lesions  E/M: NEGATIVE for ear, mouth and throat problems  R: NEGATIVE for  significant cough or SHORTNESS OF BREATH,   CV: NEGATIVE for chest pain, palpitations or peripheral edema  GI: NEGATIVE for nausea, abdominal pain, heartburn, or change in bowel habits  NEURO: NEGATIVE any motor/sensory changes  PSYCH: NEGATIVE for recent mood disorder    Physical Exam:  /81   Pulse 67   SpO2 98%    Patient is pleasant, in no acute distress, good general condition.  Lung: no evidence of respiratory distress    Abdomen: Soft, nondistended, non tender. No masses. No rebound or guarding.   Exam: penis no d/c. Testis no masses.  No scrotal skin lesion. Prostate smooth no nodule.  Non tender  Skin: Warm and dry.  No redness.  Psych: normal mood and affect  Neuro: alert and oriented    Assessment/Plan:   (N40.1) Benign prostatic hyperplasia with lower urinary tract symptoms, symptom details unspecified  (primary encounter diagnosis)  Comment: bladder scan < 100 ml  Plan: cont with flomax/proscar    (R97.20) Elevated prostate specific antigen (PSA)  Comment: stable  Plan: PSA, tumor marker        In six months    ED: cont with viagra    HTN: Patient to follow up with Primary Care provider regarding elevated blood pressure.

## 2020-02-25 NOTE — PROGRESS NOTES
Chief Complaint   Patient presents with     MYLENE Beltran is a 64 year old male who presents today for follow up of   Chief Complaint   Patient presents with     MYLENE Beltran is a pleasant 64-year-old gentleman who presents to clinic today for consultation with regard to several urological issues.  The patient is a patient of Dr. Gonzales in the past.  He has history of elevated PSA, BPH and erectile dysfunction.  The patient has had 2 previous prostate biopsies for elevated PSA.  The last one was in 2015 by Dr. Gonzales.  He also had an MRI of the prostate that did not show any obvious nodule.  His most recent PSA is 43.96.  Since he is on finasteride his actual PSA is about 8.  He was  also on flomax. He is on viagra for ED.    Current Outpatient Medications   Medication Sig Dispense Refill     amLODIPine (NORVASC) 2.5 MG tablet Take 1 tablet (2.5 mg) by mouth 2 times daily 180 tablet 0     aspirin 81 MG tablet Take 81 mg by mouth daily       atorvastatin (LIPITOR) 40 MG tablet Take 1 tablet (40 mg) by mouth daily 90 tablet 0     carvedilol (COREG) 25 MG tablet Take 1 tablet (25 mg) by mouth 2 times daily (with meals) 180 tablet 0     finasteride (PROSCAR) 5 MG tablet Take 1 tablet (5 mg) by mouth daily 90 tablet 3     lisinopril (PRINIVIL/ZESTRIL) 20 MG tablet Take 1 tablet (20 mg) by mouth 2 times daily 180 tablet 0     NEW MED CO-Q 10 taking daily.       sildenafil (VIAGRA) 100 MG cap/tab Take 1 tablet (100 mg) by mouth daily as needed for erectile dysfunction 60 tablet 3     tamsulosin (FLOMAX) 0.4 MG capsule Take 1 capsule (0.4 mg) by mouth At Bedtime 90 capsule 3     Allergies   Allergen Reactions     Nka [No Known Allergies]       Past Medical History:   Diagnosis Date     Atrial fibrillation (H) 9/23/10     CARDIAC DYSRHYTHMIAS NEC 4/3/2008     Cardiomyopathy, idiopathic (H)      Coronary artery disease     Stent     Elevated PSA 10/7/2010     HTN (hypertension) 4/25/2011      Hyperlipidemia      Hypertension      TYLER (obstructive sleep apnea)     CPAP     Shortness of breath      Past Surgical History:   Procedure Laterality Date     CARDIAC CATHERIZATION  2011    mid RCA stenosis of 85-90%-BMS     COLONOSCOPY N/A 6/5/2019    Procedure: COLONOSCOPY;  Surgeon: Kevin Andujar DO;  Location: [facility]     H ABLATION FOCAL AFIB  3/27/14     ORTHOPEDIC SURGERY      knee- both arthrocsopic     ORTHOPEDIC SURGERY      left pinky finger     REMOVE FOREIGN BODY FINGER Left 2/7/2017    Procedure: REMOVE FOREIGN BODY FINGER;  Surgeon: Adilene Mcdonald MD;  Location: [facility]     SURGICAL HISTORY OF -       right knee arthoscopic     SURGICAL HISTORY OF -       left 5th finger surgery     Family History   Problem Relation Age of Onset     Diabetes Father      Cerebrovascular Disease Father      Unknown/Adopted Maternal Grandfather      Unknown/Adopted Paternal Grandmother      Cancer Paternal Grandfather         unknown     Cardiovascular Mother         had pacemaker placed unknown reason why     Social History     Social History     Marital status:      Spouse name: N/A     Number of children: N/A     Years of education: N/A     Social History Main Topics     Smoking status: Never Smoker     Smokeless tobacco: Never Used     Alcohol use Yes      Comment: 2-3 drinks per week     Drug use: No     Sexual activity: Yes     Partners: Female     Other Topics Concern     Parent/Sibling W/ Cabg, Mi Or Angioplasty Before 65f 55m? No     Caffeine Concern Yes     2 cups caffeine per day     Sleep Concern Yes     sleep apnea, wears cpap at night     Stress Concern No     Weight Concern No     Special Diet No     Back Care No     Exercise Yes     knee exercises, weights     Social History Narrative       REVIEW OF SYSTEMS  =================  C: NEGATIVE for fever, chills, change in weight  I: NEGATIVE for worrisome rashes, moles or lesions  E/M: NEGATIVE for ear, mouth and throat problems  R:  NEGATIVE for significant cough or SHORTNESS OF BREATH,   CV: NEGATIVE for chest pain, palpitations or peripheral edema  GI: NEGATIVE for nausea, abdominal pain, heartburn, or change in bowel habits  NEURO: NEGATIVE any motor/sensory changes  PSYCH: NEGATIVE for recent mood disorder    Physical Exam:  /81   Pulse 67   SpO2 98%    Patient is pleasant, in no acute distress, good general condition.  Lung: no evidence of respiratory distress    Abdomen: Soft, nondistended, non tender. No masses. No rebound or guarding.   Exam: penis no d/c. Testis no masses.  No scrotal skin lesion. Prostate smooth no nodule.  Non tender  Skin: Warm and dry.  No redness.  Psych: normal mood and affect  Neuro: alert and oriented    Assessment/Plan:   (N40.1) Benign prostatic hyperplasia with lower urinary tract symptoms, symptom details unspecified  (primary encounter diagnosis)  Comment: bladder scan < 50 ml  Plan: cont with flomax/proscar    (R97.20) Elevated prostate specific antigen (PSA)  Comment: stable  Plan: PSA, tumor marker        In 12 months

## 2020-02-26 DIAGNOSIS — E78.49 OTHER HYPERLIPIDEMIA: ICD-10-CM

## 2020-02-26 DIAGNOSIS — G47.33 OSA (OBSTRUCTIVE SLEEP APNEA): Primary | ICD-10-CM

## 2020-03-03 DIAGNOSIS — R97.20 ELEVATED PSA: ICD-10-CM

## 2020-03-04 RX ORDER — FINASTERIDE 5 MG/1
5 TABLET, FILM COATED ORAL DAILY
Qty: 90 TABLET | Refills: 3 | OUTPATIENT
Start: 2020-03-04

## 2020-03-04 NOTE — TELEPHONE ENCOUNTER
finasteride (PROSCAR) 5 MG tablet 90 tablet 3 2/25/2020  No   Sig - Route: Take 1 tablet (5 mg) by mouth daily - Oral   Sent to pharmacy as: finasteride (PROSCAR) 5 MG tablet   Class: E-Prescribe   Order: [id number]   E-Prescribing Status: Receipt confirmed by pharmacy (2/25/2020  8:45 AM CST)     Duplicate request.   Closing encounter.     Demetra Ashton RN

## 2020-08-11 DIAGNOSIS — I10 BENIGN ESSENTIAL HTN: ICD-10-CM

## 2020-08-11 RX ORDER — LISINOPRIL 20 MG/1
20 TABLET ORAL 2 TIMES DAILY
Qty: 180 TABLET | Refills: 2 | Status: SHIPPED | OUTPATIENT
Start: 2020-08-11 | End: 2021-02-03

## 2020-08-19 NOTE — TELEPHONE ENCOUNTER
HOSPITAL PRE-OPERATIVE INSTRUCTIONS                                                                       [facility]  [address] [facility] [address]        Patient Name: [facility]   Procedure: Hysteroscopy D&C poss. Morcellation polyp novasure ablation  Surgery Date: 9/11/20 Arrival Time: 8:30am Surgery Time: 10:00am    [facility] Directions:   - Enter at the main entrance.   - If you have not received a pre-op call from the Admitting Department by 2 p.m. the day prior to scheduled surgery, call [phone].        PRE-ADMISSION LAB WORK:  Non-fasting Date: A few days before appointment with Dr. Bess  Chest X-Ray: No   EKG: No  Location of Lab and X-ray Any [facility]    Postoperative Appointment with MD Gamble Location [place] Date 10/8/20 Time 3:00pm    Other tests and instructions: None    If you are taking aspirin products or NSAID's (non-steroidal anti-inflammatory medications) multi-vitamins or Vitamin E, you must stop these medications 7 days prior to your surgery/procedure. Tylenol may be used.    Examples of aspirin products or NSAID's  Advil Empirin Aleve   Ibuprofen Lola Ramsay Trena   Ascriptin Motrin Bufferin   Nyquil Naprosyn Ecedrin   Voltaren Ecotrin      .      HISTORY AND PHYSICAL EXAM:  The [facility] requires History and Physical Exams to be done within 30 days of surgery. If this appointment is not completed within this time frame, an additional visit and co-pay may apply.      BARRIE Bess  Location: [place] Date: 8/13/20  Time: 8:20am           Tye Felipe          9/3/20               3:15pm    Medications:  1. Unless otherwise instructed by your physician, STOP over-the-counter, anti-inflammatories, herbal supplements, and blood thinning medications for 7 days prior to surgery. Special instructions related to medications: Per Dr Gamble    Medication Instructions for Day of Surgery:   · Please take your cardiac medicine,  PSA done today.  3 month supply of refill sent per protocol.    Corey Rosa RN....7/31/2018 12:50 PM      high blood pressure medicine, prednisone, and any seizure medication with a sip of water. Do not take any diuretic (water pills) or oral medication for diabetes.  · For insulin dependent diabetics:  Do not take any short acting or sliding scale insulin in the morning.  Unless otherwise directed by your personal physician, follow the following instructions: If your procedure is scheduled before noon, take one half of your long acting insulin in the morning.  If your procedure is scheduled after 12 noon, take one third of your long acting insulin in the morning and you may have a clear liquid diet 6 hours prior to your surgery    If you develop a cold or fever, sore throat or runny nose, please notify your doctor immediately.     EATING AND DRINKING GUIDELINES:  * ADULTS: Nothing to eat or drink after midnight.  * Arrange for an adult to drive you to and from your surgery. An adult must stay with you for the first 24 hours after your surgery.   * Do not wear any jewelry.  * Please no make-up.  * Wear loose fitting clothing.  * Bring inhalers or nebulizer.  * Do not wear contacts.   * Do not bring valuables.    : [place] Phone: [phone]

## 2020-11-17 ENCOUNTER — TRANSFERRED RECORDS (OUTPATIENT)
Dept: HEALTH INFORMATION MANAGEMENT | Facility: CLINIC | Age: 65
End: 2020-11-17

## 2021-01-15 ENCOUNTER — HEALTH MAINTENANCE LETTER (OUTPATIENT)
Age: 66
End: 2021-01-15

## 2021-01-24 ENCOUNTER — HEALTH MAINTENANCE LETTER (OUTPATIENT)
Age: 66
End: 2021-01-24

## 2021-02-03 DIAGNOSIS — E78.49 OTHER HYPERLIPIDEMIA: ICD-10-CM

## 2021-02-03 DIAGNOSIS — I48.19 PERSISTENT ATRIAL FIBRILLATION (H): ICD-10-CM

## 2021-02-03 DIAGNOSIS — I10 BENIGN ESSENTIAL HTN: ICD-10-CM

## 2021-02-03 RX ORDER — ATORVASTATIN CALCIUM 40 MG/1
40 TABLET, FILM COATED ORAL DAILY
Qty: 90 TABLET | Refills: 0 | Status: SHIPPED | OUTPATIENT
Start: 2021-02-03 | End: 2021-04-28

## 2021-02-03 RX ORDER — CARVEDILOL 25 MG/1
25 TABLET ORAL 2 TIMES DAILY WITH MEALS
Qty: 180 TABLET | Refills: 0 | Status: SHIPPED | OUTPATIENT
Start: 2021-02-03 | End: 2021-04-28

## 2021-02-03 RX ORDER — AMLODIPINE BESYLATE 2.5 MG/1
2.5 TABLET ORAL 2 TIMES DAILY
Qty: 180 TABLET | Refills: 0 | Status: SHIPPED | OUTPATIENT
Start: 2021-02-03 | End: 2021-04-28

## 2021-02-03 RX ORDER — LISINOPRIL 20 MG/1
20 TABLET ORAL 2 TIMES DAILY
Qty: 180 TABLET | Refills: 0 | Status: SHIPPED | OUTPATIENT
Start: 2021-02-03 | End: 2021-04-28

## 2021-02-04 DIAGNOSIS — I48.19 PERSISTENT ATRIAL FIBRILLATION (H): Primary | ICD-10-CM

## 2021-03-18 ENCOUNTER — TELEPHONE (OUTPATIENT)
Dept: UROLOGY | Facility: CLINIC | Age: 66
End: 2021-03-18

## 2021-03-18 DIAGNOSIS — R97.20 ELEVATED PSA: ICD-10-CM

## 2021-03-18 DIAGNOSIS — N40.1 BPH WITH URINARY OBSTRUCTION: ICD-10-CM

## 2021-03-18 DIAGNOSIS — N13.8 BPH WITH URINARY OBSTRUCTION: ICD-10-CM

## 2021-03-18 RX ORDER — TAMSULOSIN HYDROCHLORIDE 0.4 MG/1
0.4 CAPSULE ORAL AT BEDTIME
Qty: 90 CAPSULE | Refills: 3 | Status: SHIPPED | OUTPATIENT
Start: 2021-03-18 | End: 2021-05-06

## 2021-03-18 RX ORDER — FINASTERIDE 5 MG/1
5 TABLET, FILM COATED ORAL DAILY
Qty: 90 TABLET | Refills: 0 | Status: SHIPPED | OUTPATIENT
Start: 2021-03-18 | End: 2021-05-06

## 2021-03-18 NOTE — TELEPHONE ENCOUNTER
Reason for Call:  Medication or medication refill:    Do you use a [facility]?  Name of the pharmacy and phone number for the current request:  Mail Order    Name of the medication requested: tamsulosin, finasteride     Other request: patient only has 5 days left of this medication provider needs to send in a new script to pharmacy for refills      Can we leave a detailed message on this number? YES    Phone number patient can be reached at: Home number on file [phone] (home)    Best Time: any    Call taken on 3/18/2021 at 8:53 AM by Fany Leong

## 2021-04-01 ENCOUNTER — HOSPITAL ENCOUNTER (OUTPATIENT)
Dept: CARDIOLOGY | Facility: CLINIC | Age: 66
Discharge: HOME | End: 2021-04-01
Attending: PHYSICIAN ASSISTANT | Admitting: PHYSICIAN ASSISTANT
Payer: COMMERCIAL

## 2021-04-01 ENCOUNTER — TELEPHONE (OUTPATIENT)
Dept: UROLOGY | Facility: CLINIC | Age: 66
End: 2021-04-01

## 2021-04-01 DIAGNOSIS — N40.1 BPH WITH URINARY OBSTRUCTION: ICD-10-CM

## 2021-04-01 DIAGNOSIS — I48.19 PERSISTENT ATRIAL FIBRILLATION (H): ICD-10-CM

## 2021-04-01 DIAGNOSIS — N13.8 BPH WITH URINARY OBSTRUCTION: ICD-10-CM

## 2021-04-01 PROCEDURE — 93242 EXT ECG>48HR<7D RECORDING: CPT

## 2021-04-01 PROCEDURE — 93244 EXT ECG>48HR<7D REV&INTERPJ: CPT | Performed by: INTERNAL MEDICINE

## 2021-04-01 RX ORDER — TAMSULOSIN HYDROCHLORIDE 0.4 MG/1
0.4 CAPSULE ORAL AT BEDTIME
Qty: 90 CAPSULE | Refills: 3 | Status: CANCELLED | OUTPATIENT
Start: 2021-04-01

## 2021-04-01 NOTE — TELEPHONE ENCOUNTER
Reason for Call:  Other prescription    Detailed comments: Please call to discuss refilling Tamsulosin HCL. Patient states he has been out of medication for 1 1/2 weeks now and has requested this 6 times now. Please call.     Phone Number Patient can be reached at: Home number on file [phone] (home)    Best Time: any    Can we leave a detailed message on this number? YES    Call taken on 4/1/2021 at 3:54 PM by Emma Monae

## 2021-04-23 ENCOUNTER — IMMUNIZATION (OUTPATIENT)
Dept: NURSING | Facility: CLINIC | Age: 66
End: 2021-04-23
Payer: COMMERCIAL

## 2021-04-23 PROCEDURE — 91300 PR COVID VAC PFIZER DIL RECON 30 MCG/0.3 ML IM: CPT

## 2021-04-23 PROCEDURE — 0001A PR COVID VAC PFIZER DIL RECON 30 MCG/0.3 ML IM: CPT

## 2021-04-27 ENCOUNTER — OFFICE VISIT (OUTPATIENT)
Dept: CARDIOLOGY | Facility: CLINIC | Age: 66
End: 2021-04-27
Attending: PHYSICIAN ASSISTANT
Payer: COMMERCIAL

## 2021-04-27 VITALS
DIASTOLIC BLOOD PRESSURE: 72 MMHG | OXYGEN SATURATION: 98 % | TEMPERATURE: 97.2 F | SYSTOLIC BLOOD PRESSURE: 116 MMHG | HEART RATE: 56 BPM | WEIGHT: 228.2 LBS | BODY MASS INDEX: 31.83 KG/M2

## 2021-04-27 DIAGNOSIS — I48.19 PERSISTENT ATRIAL FIBRILLATION (H): ICD-10-CM

## 2021-04-27 DIAGNOSIS — E78.49 OTHER HYPERLIPIDEMIA: ICD-10-CM

## 2021-04-27 DIAGNOSIS — E78.5 DYSLIPIDEMIA, GOAL LDL BELOW 100: ICD-10-CM

## 2021-04-27 DIAGNOSIS — I25.10 CORONARY ARTERY DISEASE INVOLVING NATIVE CORONARY ARTERY OF NATIVE HEART WITHOUT ANGINA PECTORIS: Primary | ICD-10-CM

## 2021-04-27 DIAGNOSIS — R97.20 ELEVATED PSA: ICD-10-CM

## 2021-04-27 LAB
ALT SERPL W P-5'-P-CCNC: 24 U/L (ref 0–70)
CHOLEST SERPL-MCNC: 175 MG/DL
HDLC SERPL-MCNC: 46 MG/DL
LDLC SERPL CALC-MCNC: 102 MG/DL
NONHDLC SERPL-MCNC: 129 MG/DL
PSA SERPL-MCNC: 4.52 UG/L (ref 0–4)
TRIGL SERPL-MCNC: 137 MG/DL

## 2021-04-27 PROCEDURE — 80061 LIPID PANEL: CPT | Performed by: PHYSICIAN ASSISTANT

## 2021-04-27 PROCEDURE — 99214 OFFICE O/P EST MOD 30 MIN: CPT | Performed by: PHYSICIAN ASSISTANT

## 2021-04-27 PROCEDURE — 84153 ASSAY OF PSA TOTAL: CPT | Performed by: PHYSICIAN ASSISTANT

## 2021-04-27 PROCEDURE — 84460 ALANINE AMINO (ALT) (SGPT): CPT | Performed by: PHYSICIAN ASSISTANT

## 2021-04-27 PROCEDURE — 36415 COLL VENOUS BLD VENIPUNCTURE: CPT | Performed by: PHYSICIAN ASSISTANT

## 2021-04-27 NOTE — PATIENT INSTRUCTIONS
At your visit today we reviewed your ZIoPatch showing no atrial fibrillation. This looked great!     Medication Changes:    1. No changes. Reviewed photosensitivity is a common side effect of atorvastatin/Lipitor, carvedilol/Coreg and lisinopril. We can always hold one x a few weeks and see if that makes the photosensitivity any better!   Could always replace them one at a time if we find the culprit!    Recommendations:    Keep track of the sun rash... let us know if want to start working on holding meds    Follow-up:    See us for cardiology follow up in 1 year but CALL Cardiology nurses Echo & Emma @ [phone] for any issues, questions or concerns in the interim.      To schedule a future appointment, we kindly ask that you call cardiology scheduling at [phone] three months prior to requested visit date.    Important Phone Numbers for [facility]):    [place] Emma & Echo: [phone]  Cardiology [phone]  [place] [facility] [phone]  [place] [facility] [phone]  [facility]: [phone]

## 2021-04-27 NOTE — LETTER
"4/27/2021    Fahad Fisher MD  [address] [address]    RE: Doron Feldman       Dear Colleague,    I had the pleasure of seeing Doron SCHWARTZ Francia in the [facility].    [facility]      Assessment & Plan   Problem List Items Addressed This Visit     Atrial fibrillation (H)      Other Visit Diagnoses     Coronary artery disease involving native coronary artery of native heart without angina pectoris    -  Primary    Relevant Orders    Follow-Up with Electrophysiologist    Lipid Profile    ALT    Dyslipidemia, goal LDL below 100        Relevant Orders    Follow-Up with Electrophysiologist    Lipid Profile    ALT         I had the pleasure of seeing Bhavik when he came for follow up of AFib.  This 65 year old sees Dr. Martinez for his history of:    1. Sx'c persistent AFib with failure of sotalol and Multaq, s/p ablation 3/2014. Without recurrence  2. Benign Essential HTN  3. CAD s/p RCA stent 2011   4. TYLER on CPAP therapy  5. CHADSVASc 3 (HTN, age, CAD); he's opted to not be on AC       I saw Bhavik 2/2020 at which time he was doing well. He'd continued to decline AC despite CHADSVASc 2. Annual follow-up with repeat ZP rec'd.     Interval History:  Really feeling good. He is building a house in [place] - lots of physical activity without any c/o CP, SOB despite heavy, hard work.     He's not had any AFib since he last saw us. ZioPatch without AFib and he denies palpitations, dizziness, or lightheadedness.    Notes edema is \"best it's ever been.\" No orthopnea/ PND.  Occasionally checks BP and gets #s similar to here (110s).    Notes that when he works out in the sun he gets a rash on his forearms and back of the legs. Minimal pruritis with this. Wondering if it's one of his medications that could be contributing.      VITALS:  Vitals: /72   Pulse 56   Temp 97.2  F (36.2  C) (Tympanic)   Wt 103.5 kg (228 lb 3.2 oz)   SpO2 98%  "  BMI 31.83 kg/m      Diagnostic Testing:  ZioPatch 4/1-8/2021 while on Carvedilol 25 mg BID showed SR with 1 run of PSVT.  In SR, avg HR 68 bpm (range 52-99 bpm).  14 beat run of SVT noted unit(s) pto 122 bpm, avg 112 bpm. Asx'c. <1% PACs/PVCs   Echocardiogam 9/2017 showed EF 55%. Grade 3 diastolic dysfunction. RWMA with mild distal lateral/high-lateral hypokinesis. RV wnl. LA borderline dilated. 1+ MR.   Nuclear Stress Test 11/2017 showed no significant ischemia, while reaching only a 71% MPHR  Component      Latest Ref Rng & Units 12/20/2017 2/24/2020 4/27/2021   Cholesterol      <200 mg/dL 148 170 175   Triglycerides      <150 mg/dL 179 (H) 71 137   HDL Cholesterol      >39 mg/dL 38 (L) 51 46   LDL Cholesterol Calculated      <100 mg/dL 74 105 (H) 102 (H)   Non HDL Cholesterol      <130 mg/dL 110 119 129   ALT      0 - 70 U/L   24       Plan:  1. Annual follow-up    Assessment/Plan:    1. H/o Persistent AFib; EF 55%    S/p AFib ablation 2014    Follow-up Jak with no recurrent AFib    PLAN:    Continue Coreg for now. CHADSVASc is up to 3 but no recurrent AFib since ablation 2014.     2. Photosensitiivty    Notes a rash in the sun - reviewed epocrates and noted that atorvastatin, lisinopril and carvedilol all have photosensitivity listed as potential ADRs    At this point, not bad enough to make changes    PLAN:    Sunscreen, SPF clothing rec'd    He'll let us know if he wants to try coming off of a med to see if rash improves (would start with atorvastatin, then lisinopril then carvedilol)       3. Dyslipidemia, CAD    No c/o CP and pushes himself hard without change in exercise tolerance    Lipids as above look OK. Aiming for LDL <100 mg/dL    PLAN:    Continue current meds for now    Repeat lipids 1 year        Geneva Bruce PA-C, MSPAS      Orders Placed This Encounter   Procedures     Lipid Profile     ALT     Follow-Up with Electrophysiologist     No orders of the defined types were placed in this  encounter.    There are no discontinued medications.      Encounter Diagnoses   Name Primary?     Persistent atrial fibrillation (H)      Coronary artery disease involving native coronary artery of native heart without angina pectoris Yes     Dyslipidemia, goal LDL below 100        CURRENT MEDICATIONS:  Current Outpatient Medications   Medication Sig Dispense Refill     amLODIPine (NORVASC) 2.5 MG tablet Take 1 tablet (2.5 mg) by mouth 2 times daily Needs an appointment for refills. 180 tablet 0     aspirin 81 MG tablet Take 81 mg by mouth daily       atorvastatin (LIPITOR) 40 MG tablet Take 1 tablet (40 mg) by mouth daily Needs an appointment for refills. 90 tablet 0     carvedilol (COREG) 25 MG tablet Take 1 tablet (25 mg) by mouth 2 times daily (with meals) Needs an appointment for refills. 180 tablet 0     finasteride (PROSCAR) 5 MG tablet Take 1 tablet (5 mg) by mouth daily 90 tablet 0     lisinopril (ZESTRIL) 20 MG tablet Take 1 tablet (20 mg) by mouth 2 times daily Needs an appointment for refills. 180 tablet 0     sildenafil (VIAGRA) 100 MG cap/tab Take 1 tablet (100 mg) by mouth daily as needed for erectile dysfunction 60 tablet 3     tamsulosin (FLOMAX) 0.4 MG capsule Take 1 capsule (0.4 mg) by mouth At Bedtime 90 capsule 3       ALLERGIES     Allergies   Allergen Reactions     Nka [No Known Allergies]          Review of Systems:  Skin:  Positive for itching;rash   Eyes:  Negative    ENT:  Positive for nasal congestion  Respiratory:  Negative for cough;dyspnea on exertion;shortness of breath  Cardiovascular:  Negative for;palpitations;chest pain;edema;dizziness;lightheadedness;fatigue;exercise intolerance    Gastroenterology: Negative for melena;hematochezia  Genitourinary:  Negative    Musculoskeletal:  Positive for neck pain  Neurologic:  Negative    Psychiatric:  Negative    Heme/Lymph/Imm:  Negative    Endocrine:  Negative      Physical Exam:  Vitals: /72   Pulse 56   Temp 97.2  F (36.2  C)  (Tympanic)   Wt 103.5 kg (228 lb 3.2 oz)   SpO2 98%   BMI 31.83 kg/m      Constitutional:  cooperative, alert and oriented, well developed, well nourished, in no acute distress overweight      Skin:  warm and dry to the touch, no apparent skin lesions or masses noted(diffuse erhythema )   facial erythema    Head:  normocephalic, no masses or lesions        Eyes:  pupils equal and round;conjunctivae and lids unremarkable;sclera white        ENT:  not assessed this visit        Neck:  JVP normal;no carotid bruit        Chest:  normal breath sounds, clear to auscultation, normal A-P diameter, normal symmetry, normal respiratory excursion, no use of accessory muscles        Cardiac: regular rhythm;no murmurs, gallops or rubs detected                  Abdomen:  abdomen soft        Vascular: pulses full and equal                                      Extremities and Back:  no deformities, clubbing, cyanosis, erythema observed;no edema        Neurological:  no gross motor deficits            PAST MEDICAL HISTORY:  Past Medical History:   Diagnosis Date     Atrial fibrillation (H) 9/23/10     CARDIAC DYSRHYTHMIAS NEC 4/3/2008     Cardiomyopathy, idiopathic (H)      Coronary artery disease     Stent     Elevated PSA 10/7/2010     HTN (hypertension) 4/25/2011     Hyperlipidemia      Hypertension      TYLER (obstructive sleep apnea)     CPAP     Shortness of breath        PAST SURGICAL HISTORY:  Past Surgical History:   Procedure Laterality Date     CARDIAC CATHERIZATION  2011    mid RCA stenosis of 85-90%-BMS     COLONOSCOPY N/A 6/5/2019    Procedure: COLONOSCOPY;  Surgeon: Kevin Andujar DO;  Location: [facility]     H ABLATION FOCAL AFIB  3/27/14     ORTHOPEDIC SURGERY      knee- both arthrocsopic     ORTHOPEDIC SURGERY      left pinky finger     REMOVE FOREIGN BODY FINGER Left 2/7/2017    Procedure: REMOVE FOREIGN BODY FINGER;  Surgeon: Adilene Mcdonald MD;  Location: [facility]     SURGICAL HISTORY OF -       right  knee arthoscopic     SURGICAL HISTORY OF -       left 5th finger surgery       FAMILY HISTORY:  Family History   Problem Relation Age of Onset     Diabetes Father      Cerebrovascular Disease Father      Unknown/Adopted Maternal Grandfather      Unknown/Adopted Paternal Grandmother      Cancer Paternal Grandfather         unknown     Cardiovascular Mother         had pacemaker placed unknown reason why       SOCIAL HISTORY:  Social History     Socioeconomic History     Marital status:      Spouse name: None     Number of children: None     Years of education: None     Highest education level: None   Occupational History     None   Social Needs     Financial resource strain: None     Food insecurity     Worry: None     Inability: None     Transportation needs     Medical: None     Non-medical: None   Tobacco Use     Smoking status: Never Smoker     Smokeless tobacco: Never Used   Substance and Sexual Activity     Alcohol use: Yes     Comment: 2-3 drinks per week     Drug use: No     Sexual activity: Yes     Partners: Female   Lifestyle     Physical activity     Days per week: None     Minutes per session: None     Stress: None   Relationships     Social connections     Talks on phone: None     Gets together: None     Attends Oriental orthodox service: None     Active member of club or organization: None     Attends meetings of clubs or organizations: None     Relationship status: None     Intimate partner violence     Fear of current or ex partner: None     Emotionally abused: None     Physically abused: None     Forced sexual activity: None   Other Topics Concern     Parent/sibling w/ CABG, MI or angioplasty before 65F 55M? No      Service Not Asked     Blood Transfusions Not Asked     Caffeine Concern Yes     Comment: 2 cups caffeine per day     Occupational Exposure Not Asked     Hobby Hazards Not Asked     Sleep Concern Yes     Comment: sleep apnea, wears cpap at night     Stress Concern No     Weight  Concern No     Special Diet No     Back Care No     Exercise Yes     Comment: knee exercises, weights     Bike Helmet Not Asked     Seat Belt Not Asked     Self-Exams Not Asked   Social History Narrative     None       Thank you for allowing me to participate in the care of your patient.      Sincerely,     Moriah Bruce PA-C     [facility]    cc:   Moriah Bruce PA-C  [address]  [place] [address]

## 2021-04-28 DIAGNOSIS — I10 BENIGN ESSENTIAL HTN: ICD-10-CM

## 2021-04-28 DIAGNOSIS — I48.19 PERSISTENT ATRIAL FIBRILLATION (H): ICD-10-CM

## 2021-04-28 DIAGNOSIS — E78.49 OTHER HYPERLIPIDEMIA: ICD-10-CM

## 2021-04-28 RX ORDER — LISINOPRIL 20 MG/1
20 TABLET ORAL 2 TIMES DAILY
Qty: 180 TABLET | Refills: 3 | Status: SHIPPED | OUTPATIENT
Start: 2021-04-28 | End: 2022-05-19

## 2021-04-28 RX ORDER — AMLODIPINE BESYLATE 2.5 MG/1
2.5 TABLET ORAL 2 TIMES DAILY
Qty: 180 TABLET | Refills: 3 | Status: SHIPPED | OUTPATIENT
Start: 2021-04-28 | End: 2022-05-19

## 2021-04-28 RX ORDER — CARVEDILOL 25 MG/1
25 TABLET ORAL 2 TIMES DAILY WITH MEALS
Qty: 180 TABLET | Refills: 3 | Status: SHIPPED | OUTPATIENT
Start: 2021-04-28 | End: 2022-08-17

## 2021-04-28 RX ORDER — ATORVASTATIN CALCIUM 40 MG/1
40 TABLET, FILM COATED ORAL DAILY
Qty: 90 TABLET | Refills: 3 | Status: SHIPPED | OUTPATIENT
Start: 2021-04-28 | End: 2022-05-19

## 2021-05-06 ENCOUNTER — VIRTUAL VISIT (OUTPATIENT)
Dept: UROLOGY | Facility: CLINIC | Age: 66
End: 2021-05-06
Payer: COMMERCIAL

## 2021-05-06 DIAGNOSIS — N13.8 BPH WITH URINARY OBSTRUCTION: ICD-10-CM

## 2021-05-06 DIAGNOSIS — N40.1 BPH WITH URINARY OBSTRUCTION: ICD-10-CM

## 2021-05-06 DIAGNOSIS — R97.20 ELEVATED PSA: ICD-10-CM

## 2021-05-06 PROCEDURE — 99213 OFFICE O/P EST LOW 20 MIN: CPT | Mod: 95 | Performed by: UROLOGY

## 2021-05-06 RX ORDER — FINASTERIDE 5 MG/1
5 TABLET, FILM COATED ORAL DAILY
Qty: 90 TABLET | Refills: 3 | Status: SHIPPED | OUTPATIENT
Start: 2021-05-06 | End: 2022-06-07

## 2021-05-06 RX ORDER — TAMSULOSIN HYDROCHLORIDE 0.4 MG/1
0.4 CAPSULE ORAL AT BEDTIME
Qty: 90 CAPSULE | Refills: 3 | Status: SHIPPED | OUTPATIENT
Start: 2021-05-06 | End: 2022-06-07

## 2021-05-06 NOTE — PROGRESS NOTES
Bhavik is a 65 year old who is being evaluated via a billable video visit.      How would you like to obtain your AVS? MyChart  If the video visit is dropped, the invitation should be resent by: Text to cell phone: [phone]  Will anyone else be joining your video visit? No      Video Start Time: 1020    Assessment & Plan   Problem List Items Addressed This Visit     Elevated PSA    Relevant Medications    finasteride (PROSCAR) 5 MG tablet    Other Relevant Orders    PSA tumor marker      Other Visit Diagnoses     BPH with urinary obstruction    -  Primary    Relevant Medications    finasteride (PROSCAR) 5 MG tablet    tamsulosin (FLOMAX) 0.4 MG capsule           Prescription drug management  15 minutes spent on the date of the encounter doing chart review, history and exam, documentation and further activities per the note       See Patient Instructions    No follow-ups on file.    César Begum MD  [facility]   Bhavik is a 65 year old who presents for the following health issues benign prostatic hyperplasia/elevated psa    HPI     65-year-old male follow-up for elevated PSA and BPH.  He has history of elevated PSA for a number of years with negative biopsy in the past.  MRI several years ago was unremarkable except for enlarged prostate.  He is currently on both Flomax and Proscar.  Medications worked well without any side effects.  Recent PSA was stable.    Review of Systems   Constitutional, HEENT, cardiovascular, pulmonary, gi and gu systems are negative, except as otherwise noted.      Objective           Vitals:  No vitals were obtained today due to virtual visit.    Physical Exam   GENERAL: Healthy, alert and no distress  EYES: Eyes grossly normal to inspection.  No discharge or erythema, or obvious scleral/conjunctival abnormalities.  RESP: No audible wheeze, cough, or visible cyanosis.  No visible retractions or increased work of breathing.    SKIN: Visible skin clear.  No significant rash, abnormal pigmentation or lesions.  NEURO: Cranial nerves grossly intact.  Mentation and speech appropriate for age.  PSYCH: Mentation appears normal, affect normal/bright, judgement and insight intact, normal speech and appearance well-groomed.    #1 BPH: Continue with Flomax and Proscar  #2 elevated PSA: Recheck in 1 year.            Video-Visit Details    Type of service:  Video Visit    Video End Time:10:28 AM    Originating Location (pt. Location): Home    Distant Location (provider location):  [facility]     Platform used for Video Visit: [employer]

## 2021-05-14 ENCOUNTER — IMMUNIZATION (OUTPATIENT)
Dept: NURSING | Facility: CLINIC | Age: 66
End: 2021-05-14
Attending: FAMILY MEDICINE
Payer: COMMERCIAL

## 2021-05-14 PROCEDURE — 0002A PR COVID VAC PFIZER DIL RECON 30 MCG/0.3 ML IM: CPT

## 2021-05-14 PROCEDURE — 91300 PR COVID VAC PFIZER DIL RECON 30 MCG/0.3 ML IM: CPT

## 2021-08-03 DIAGNOSIS — R97.20 ELEVATED PSA: ICD-10-CM

## 2021-08-03 RX ORDER — FINASTERIDE 5 MG/1
5 TABLET, FILM COATED ORAL DAILY
Qty: 90 TABLET | Refills: 3 | OUTPATIENT
Start: 2021-08-03

## 2021-08-05 ENCOUNTER — TELEPHONE (OUTPATIENT)
Dept: UROLOGY | Facility: CLINIC | Age: 66
End: 2021-08-05

## 2021-08-05 NOTE — TELEPHONE ENCOUNTER
A 1 year supply (90 tablets with 3 refills) was sent to [facility] by Dr. Begum on 5/6/21 therefore patient should not need a new prescription. Contacted the patient who said he has called [facility] several times and has been told that they do not have a prescription for finasteride. Contacted [facility] and spoke to a pharmacist Suri who stated that they did not received a prescription for finasteride. RN provided a verbal order for finasteride 5 mg tablet daily, 90 tablets with 3 refills.    Corey LAY RN....8/5/2021 2:11 PM

## 2021-08-05 NOTE — TELEPHONE ENCOUNTER
Patient needs refill of Finesteride 5 mg. He has been out for 1 mth. Request was being sent to [facility]. Please refill as soon as possible. Ok to leave a detailed message. Please call patient to notify.

## 2021-09-05 ENCOUNTER — HEALTH MAINTENANCE LETTER (OUTPATIENT)
Age: 66
End: 2021-09-05

## 2022-02-20 ENCOUNTER — HEALTH MAINTENANCE LETTER (OUTPATIENT)
Age: 67
End: 2022-02-20

## 2022-04-22 ENCOUNTER — NURSE TRIAGE (OUTPATIENT)
Dept: NURSING | Facility: CLINIC | Age: 67
End: 2022-04-22
Payer: COMMERCIAL

## 2022-04-22 NOTE — TELEPHONE ENCOUNTER
Patient calling to say he missed his second Shingles booster that had been due 1-.  He will probably need to get both shots still.  He would like to add this onto his appointment already scheduled for Covid booster.  Transferred to schedulers.    Eli Sharpe RN  [facility]

## 2022-04-25 ENCOUNTER — LAB (OUTPATIENT)
Dept: LAB | Facility: CLINIC | Age: 67
End: 2022-04-25
Payer: COMMERCIAL

## 2022-04-25 DIAGNOSIS — I25.10 CORONARY ARTERY DISEASE INVOLVING NATIVE CORONARY ARTERY OF NATIVE HEART WITHOUT ANGINA PECTORIS: ICD-10-CM

## 2022-04-25 DIAGNOSIS — E78.5 DYSLIPIDEMIA, GOAL LDL BELOW 100: ICD-10-CM

## 2022-04-25 DIAGNOSIS — R97.20 ELEVATED PSA: ICD-10-CM

## 2022-04-25 LAB
ALT SERPL W P-5'-P-CCNC: 28 U/L (ref 0–70)
CHOLEST SERPL-MCNC: 179 MG/DL
FASTING STATUS PATIENT QL REPORTED: YES
HDLC SERPL-MCNC: 38 MG/DL
LDLC SERPL CALC-MCNC: 100 MG/DL
NONHDLC SERPL-MCNC: 141 MG/DL
PSA SERPL-MCNC: 4.47 UG/L (ref 0–4)
TRIGL SERPL-MCNC: 206 MG/DL

## 2022-04-25 PROCEDURE — 84460 ALANINE AMINO (ALT) (SGPT): CPT

## 2022-04-25 PROCEDURE — 36415 COLL VENOUS BLD VENIPUNCTURE: CPT

## 2022-04-25 PROCEDURE — 80061 LIPID PANEL: CPT

## 2022-04-25 PROCEDURE — 84153 ASSAY OF PSA TOTAL: CPT

## 2022-04-26 ENCOUNTER — IMMUNIZATION (OUTPATIENT)
Dept: FAMILY MEDICINE | Facility: CLINIC | Age: 67
End: 2022-04-26
Payer: COMMERCIAL

## 2022-04-26 PROCEDURE — 0054A COVID-19,PF,PFIZER (12+ YRS): CPT

## 2022-04-26 PROCEDURE — 91305 COVID-19,PF,PFIZER (12+ YRS): CPT

## 2022-05-17 ENCOUNTER — OFFICE VISIT (OUTPATIENT)
Dept: CARDIOLOGY | Facility: CLINIC | Age: 67
End: 2022-05-17
Payer: COMMERCIAL

## 2022-05-17 VITALS
DIASTOLIC BLOOD PRESSURE: 73 MMHG | WEIGHT: 238.8 LBS | BODY MASS INDEX: 33.31 KG/M2 | OXYGEN SATURATION: 95 % | HEART RATE: 63 BPM | SYSTOLIC BLOOD PRESSURE: 121 MMHG

## 2022-05-17 DIAGNOSIS — I48.19 PERSISTENT ATRIAL FIBRILLATION (H): Primary | ICD-10-CM

## 2022-05-17 PROCEDURE — 99213 OFFICE O/P EST LOW 20 MIN: CPT | Performed by: INTERNAL MEDICINE

## 2022-05-17 NOTE — PROGRESS NOTES
"Service Date: 05/17/2022    Thank you for allowing me to participate in the care of your delightful patient.  As you know, Bhavik is a 66-year-old gentleman with a history of symptomatic long-lasting, persistent atrial fibrillation associated with cardiomyopathy and was found to have coexisting coronary disease, which was out of proportion to the degree of LV dysfunction.  The patient was on dronedarone and eventually sotalol without success, prompting a catheter-based ablation by my partner, Dr. Martinez, who used to see the patient down in [place] but the patient preferred to go to the [facility] to be closer to his home.  His last visit with Dr. Martinez was more than 4 years ago.    The patient underwent an uneventful isolation of all pulmonary veins along with a roofline and since then has not had any atrial fibrillation recurrence as documented by multiple EKGs and the recent Zio Patch monitor from a year ago.  The patient did have a stent in RCA for the 70 % lesion.  Again, the patient did not have any symptoms such as angina but more or less was an incidental finding.  He remains very active, working on his mobile home.  He built a home out in [place] a few years ago, and recently, because of not being as active as before, he did gain some weight.  He does feel short of breath with strenuous activities, but he can walk for \"miles\" without any difficulty or climbing many flights of stairs.  The patient has been compliant with medications including baby aspirin.    I congratulated the patient on his mostly asymptomatic status and encouraged the patient to be more active as he was and lose some weight.  As long as he can \"walk for miles,\" there is no reason for ischemic evaluation.  He had a stress test done about 5 years ago that was normal.  I have a low suspicion it is angina equivalent.  I did ask him to wear a heart rate monitor because,  unfortunately, AFib can recur even after 10 years of remission.  I " would like Bhavik to come back to see Geneva, one of our advanced practice providers, a year from now and see me a year after that.    Lewis Ahn MD        D: 2022   T: 2022   MT: rosa    Name:     KASEY FRIEDMAN  MRN:      [id number]        Account:      [id number]   :      1955           Service Date: 2022       Document: [id number]

## 2022-05-17 NOTE — LETTER
5/17/2022    Fahad Fisher MD  [address] [facility] [address]    RE: Doron Feldman       Dear Colleague,     I had the pleasure of seeing Doron SCHWARTZ Mounikajluisdeepti in the [facility].  HPI and Plan:   See dictation  [id number]  Today's clinic visit entailed:  The following tests were independently interpreted by me as noted in my documentation: ECG, ZIO, cath and ep report  15 minutes spent on the date of the encounter doing chart review   Provider  Link to MDM Help Grid     The level of medical decision making during this visit was of moderate complexity.      No orders of the defined types were placed in this encounter.      No orders of the defined types were placed in this encounter.      There are no discontinued medications.      No diagnosis found.    CURRENT MEDICATIONS:  Current Outpatient Medications   Medication Sig Dispense Refill     amLODIPine (NORVASC) 2.5 MG tablet Take 1 tablet (2.5 mg) by mouth 2 times daily 180 tablet 3     aspirin 81 MG tablet Take 81 mg by mouth daily       atorvastatin (LIPITOR) 40 MG tablet Take 1 tablet (40 mg) by mouth daily 90 tablet 3     carvedilol (COREG) 25 MG tablet Take 1 tablet (25 mg) by mouth 2 times daily (with meals) 180 tablet 3     finasteride (PROSCAR) 5 MG tablet Take 1 tablet (5 mg) by mouth daily 90 tablet 3     lisinopril (ZESTRIL) 20 MG tablet Take 1 tablet (20 mg) by mouth 2 times daily 180 tablet 3     sildenafil (VIAGRA) 100 MG cap/tab Take 1 tablet (100 mg) by mouth daily as needed for erectile dysfunction 60 tablet 3     tamsulosin (FLOMAX) 0.4 MG capsule Take 1 capsule (0.4 mg) by mouth At Bedtime 90 capsule 3       ALLERGIES     Allergies   Allergen Reactions     Nka [No Known Allergies]        PAST MEDICAL HISTORY:  Past Medical History:   Diagnosis Date     Atrial fibrillation (H) 9/23/10     CARDIAC DYSRHYTHMIAS NEC 4/3/2008     Cardiomyopathy, idiopathic (H)      Coronary artery disease     Stent     Elevated PSA 10/7/2010      HTN (hypertension) 4/25/2011     Hyperlipidemia      Hypertension      TYLER (obstructive sleep apnea)     CPAP     Shortness of breath        PAST SURGICAL HISTORY:  Past Surgical History:   Procedure Laterality Date     CARDIAC CATHERIZATION  2011    mid RCA stenosis of 85-90%-BMS     COLONOSCOPY N/A 6/5/2019    Procedure: COLONOSCOPY;  Surgeon: Kevin Andujar DO;  Location: [facility]     H ABLATION FOCAL AFIB  3/27/14     ORTHOPEDIC SURGERY      knee- both arthrocsopic     ORTHOPEDIC SURGERY      left pinky finger     REMOVE FOREIGN BODY FINGER Left 2/7/2017    Procedure: REMOVE FOREIGN BODY FINGER;  Surgeon: Adilene Mcdonald MD;  Location: [facility]     SURGICAL HISTORY OF -       right knee arthoscopic     SURGICAL HISTORY OF -       left 5th finger surgery       FAMILY HISTORY:  Family History   Problem Relation Age of Onset     Diabetes Father      Cerebrovascular Disease Father      Unknown/Adopted Maternal Grandfather      Unknown/Adopted Paternal Grandmother      Cancer Paternal Grandfather         unknown     Cardiovascular Mother         had pacemaker placed unknown reason why       SOCIAL HISTORY:  Social History     Socioeconomic History     Marital status:    Tobacco Use     Smoking status: Never Smoker     Smokeless tobacco: Never Used   Substance and Sexual Activity     Alcohol use: Yes     Comment: 2-3 drinks per week     Drug use: No     Sexual activity: Yes     Partners: Female   Other Topics Concern     Parent/sibling w/ CABG, MI or angioplasty before 65F 55M? No     Caffeine Concern Yes     Comment: 2 cups caffeine per day     Sleep Concern Yes     Comment: sleep apnea, wears cpap at night     Stress Concern No     Weight Concern No     Special Diet No     Back Care No     Exercise Yes     Comment: knee exercises, weights       Review of Systems:  Skin:  Positive for rash elbows   Eyes:  Negative      ENT:  Positive for tinnitus    Respiratory:  Positive for shortness of  breath;dyspnea on exertion;sleep apnea;CPAP     Cardiovascular:  Negative      Gastroenterology: Negative      Genitourinary:  Positive for decreased urinary stream    Musculoskeletal:  Negative      Neurologic:  Negative      Psychiatric:  Negative      Heme/Lymph/Imm:  Negative      Endocrine:  Negative        Physical Exam:  Vitals: /73 (BP Location: Right arm, Patient Position: Sitting)   Pulse 63   Wt 108.3 kg (238 lb 12.8 oz)   SpO2 95%   BMI 33.31 kg/m      Constitutional:  cooperative, alert and oriented, well developed, well nourished, in no acute distress        Skin:  warm and dry to the touch, no apparent skin lesions or masses noted          Head:  normocephalic, no masses or lesions        Eyes:  pupils equal and round, conjunctivae and lids unremarkable, sclera white, no xanthalasma, EOMS intact, no nystagmus        Lymph:No Cervical lymphadenopathy present     ENT:  no pallor or cyanosis        Neck:  carotid pulses are full and equal bilaterally, JVP normal, no carotid bruit        Respiratory:  normal breath sounds, clear to auscultation, normal A-P diameter, normal symmetry, normal respiratory excursion, no use of accessory muscles         Cardiac: regular rhythm, normal S1/S2, no S3 or S4, apical impulse not displaced, no murmurs, gallops or rubs                pulses full and equal, no bruits auscultated                                        GI:  abdomen soft, non-tender, BS normoactive, no mass, no HSM, no bruits        Extremities and Muscular Skeletal:  no deformities, clubbing, cyanosis, erythema observed              Neurological:  no gross motor deficits        Psych:  Alert and Oriented x 3      CC  Moriah Bruce PA-C  [address]  [place] [address]  Thank you for allowing me to participate in the care of your patient.      Sincerely,     Lewis Evans MD     [facility]

## 2022-05-17 NOTE — PROGRESS NOTES
HPI and Plan:   See dictation  [id number]  Today's clinic visit entailed:  The following tests were independently interpreted by me as noted in my documentation: ECG, ZIO, cath and ep report  15 minutes spent on the date of the encounter doing chart review   Provider  Link to MDM Help Grid     The level of medical decision making during this visit was of moderate complexity.      No orders of the defined types were placed in this encounter.      No orders of the defined types were placed in this encounter.      There are no discontinued medications.      No diagnosis found.    CURRENT MEDICATIONS:  Current Outpatient Medications   Medication Sig Dispense Refill     amLODIPine (NORVASC) 2.5 MG tablet Take 1 tablet (2.5 mg) by mouth 2 times daily 180 tablet 3     aspirin 81 MG tablet Take 81 mg by mouth daily       atorvastatin (LIPITOR) 40 MG tablet Take 1 tablet (40 mg) by mouth daily 90 tablet 3     carvedilol (COREG) 25 MG tablet Take 1 tablet (25 mg) by mouth 2 times daily (with meals) 180 tablet 3     finasteride (PROSCAR) 5 MG tablet Take 1 tablet (5 mg) by mouth daily 90 tablet 3     lisinopril (ZESTRIL) 20 MG tablet Take 1 tablet (20 mg) by mouth 2 times daily 180 tablet 3     sildenafil (VIAGRA) 100 MG cap/tab Take 1 tablet (100 mg) by mouth daily as needed for erectile dysfunction 60 tablet 3     tamsulosin (FLOMAX) 0.4 MG capsule Take 1 capsule (0.4 mg) by mouth At Bedtime 90 capsule 3       ALLERGIES     Allergies   Allergen Reactions     Nka [No Known Allergies]        PAST MEDICAL HISTORY:  Past Medical History:   Diagnosis Date     Atrial fibrillation (H) 9/23/10     CARDIAC DYSRHYTHMIAS NEC 4/3/2008     Cardiomyopathy, idiopathic (H)      Coronary artery disease     Stent     Elevated PSA 10/7/2010     HTN (hypertension) 4/25/2011     Hyperlipidemia      Hypertension      TYLER (obstructive sleep apnea)     CPAP     Shortness of breath        PAST SURGICAL HISTORY:  Past Surgical History:   Procedure  Laterality Date     CARDIAC CATHERIZATION  2011    mid RCA stenosis of 85-90%-BMS     COLONOSCOPY N/A 6/5/2019    Procedure: COLONOSCOPY;  Surgeon: Kevin Andujar DO;  Location: [facility]     H ABLATION FOCAL AFIB  3/27/14     ORTHOPEDIC SURGERY      knee- both arthrocsopic     ORTHOPEDIC SURGERY      left pinky finger     REMOVE FOREIGN BODY FINGER Left 2/7/2017    Procedure: REMOVE FOREIGN BODY FINGER;  Surgeon: Adilene Mcdonald MD;  Location: [facility]     SURGICAL HISTORY OF -       right knee arthoscopic     SURGICAL HISTORY OF -       left 5th finger surgery       FAMILY HISTORY:  Family History   Problem Relation Age of Onset     Diabetes Father      Cerebrovascular Disease Father      Unknown/Adopted Maternal Grandfather      Unknown/Adopted Paternal Grandmother      Cancer Paternal Grandfather         unknown     Cardiovascular Mother         had pacemaker placed unknown reason why       SOCIAL HISTORY:  Social History     Socioeconomic History     Marital status:    Tobacco Use     Smoking status: Never Smoker     Smokeless tobacco: Never Used   Substance and Sexual Activity     Alcohol use: Yes     Comment: 2-3 drinks per week     Drug use: No     Sexual activity: Yes     Partners: Female   Other Topics Concern     Parent/sibling w/ CABG, MI or angioplasty before 65F 55M? No     Caffeine Concern Yes     Comment: 2 cups caffeine per day     Sleep Concern Yes     Comment: sleep apnea, wears cpap at night     Stress Concern No     Weight Concern No     Special Diet No     Back Care No     Exercise Yes     Comment: knee exercises, weights       Review of Systems:  Skin:  Positive for rash elbows   Eyes:  Negative      ENT:  Positive for tinnitus    Respiratory:  Positive for shortness of breath;dyspnea on exertion;sleep apnea;CPAP     Cardiovascular:  Negative      Gastroenterology: Negative      Genitourinary:  Positive for decreased urinary stream    Musculoskeletal:  Negative      Neurologic:   Negative      Psychiatric:  Negative      Heme/Lymph/Imm:  Negative      Endocrine:  Negative        Physical Exam:  Vitals: /73 (BP Location: Right arm, Patient Position: Sitting)   Pulse 63   Wt 108.3 kg (238 lb 12.8 oz)   SpO2 95%   BMI 33.31 kg/m      Constitutional:  cooperative, alert and oriented, well developed, well nourished, in no acute distress        Skin:  warm and dry to the touch, no apparent skin lesions or masses noted          Head:  normocephalic, no masses or lesions        Eyes:  pupils equal and round, conjunctivae and lids unremarkable, sclera white, no xanthalasma, EOMS intact, no nystagmus        Lymph:No Cervical lymphadenopathy present     ENT:  no pallor or cyanosis        Neck:  carotid pulses are full and equal bilaterally, JVP normal, no carotid bruit        Respiratory:  normal breath sounds, clear to auscultation, normal A-P diameter, normal symmetry, normal respiratory excursion, no use of accessory muscles         Cardiac: regular rhythm, normal S1/S2, no S3 or S4, apical impulse not displaced, no murmurs, gallops or rubs                pulses full and equal, no bruits auscultated                                        GI:  abdomen soft, non-tender, BS normoactive, no mass, no HSM, no bruits        Extremities and Muscular Skeletal:  no deformities, clubbing, cyanosis, erythema observed              Neurological:  no gross motor deficits        Psych:  Alert and Oriented x 3        CC  Moriah Bruce PA-C  [address]  [place] [address]

## 2022-05-19 DIAGNOSIS — E78.49 OTHER HYPERLIPIDEMIA: ICD-10-CM

## 2022-05-19 DIAGNOSIS — I48.19 PERSISTENT ATRIAL FIBRILLATION (H): ICD-10-CM

## 2022-05-19 DIAGNOSIS — I10 BENIGN ESSENTIAL HTN: ICD-10-CM

## 2022-05-19 RX ORDER — LISINOPRIL 20 MG/1
20 TABLET ORAL 2 TIMES DAILY
Qty: 180 TABLET | Refills: 3 | Status: SHIPPED | OUTPATIENT
Start: 2022-05-19 | End: 2023-07-07

## 2022-05-19 RX ORDER — AMLODIPINE BESYLATE 2.5 MG/1
2.5 TABLET ORAL 2 TIMES DAILY
Qty: 180 TABLET | Refills: 3 | Status: SHIPPED | OUTPATIENT
Start: 2022-05-19 | End: 2022-09-29

## 2022-05-19 RX ORDER — ATORVASTATIN CALCIUM 40 MG/1
40 TABLET, FILM COATED ORAL DAILY
Qty: 90 TABLET | Refills: 3 | Status: SHIPPED | OUTPATIENT
Start: 2022-05-19 | End: 2023-07-07

## 2022-06-07 ENCOUNTER — OFFICE VISIT (OUTPATIENT)
Dept: UROLOGY | Facility: CLINIC | Age: 67
End: 2022-06-07
Payer: COMMERCIAL

## 2022-06-07 VITALS
DIASTOLIC BLOOD PRESSURE: 89 MMHG | SYSTOLIC BLOOD PRESSURE: 171 MMHG | OXYGEN SATURATION: 97 % | TEMPERATURE: 97.7 F | HEART RATE: 61 BPM

## 2022-06-07 DIAGNOSIS — I10 HYPERTENSION, UNSPECIFIED TYPE: ICD-10-CM

## 2022-06-07 DIAGNOSIS — N52.9 ERECTILE DYSFUNCTION, UNSPECIFIED ERECTILE DYSFUNCTION TYPE: ICD-10-CM

## 2022-06-07 DIAGNOSIS — R97.20 ELEVATED PSA: ICD-10-CM

## 2022-06-07 DIAGNOSIS — N40.1 BPH WITH URINARY OBSTRUCTION: Primary | ICD-10-CM

## 2022-06-07 DIAGNOSIS — N13.8 BPH WITH URINARY OBSTRUCTION: Primary | ICD-10-CM

## 2022-06-07 PROCEDURE — 51798 US URINE CAPACITY MEASURE: CPT | Performed by: UROLOGY

## 2022-06-07 PROCEDURE — 99214 OFFICE O/P EST MOD 30 MIN: CPT | Mod: 25 | Performed by: UROLOGY

## 2022-06-07 RX ORDER — FINASTERIDE 5 MG/1
5 TABLET, FILM COATED ORAL DAILY
Qty: 90 TABLET | Refills: 3 | Status: SHIPPED | OUTPATIENT
Start: 2022-06-07 | End: 2023-08-17

## 2022-06-07 RX ORDER — SILDENAFIL 100 MG/1
100 TABLET, FILM COATED ORAL DAILY PRN
Qty: 30 TABLET | Refills: 4 | Status: SHIPPED | OUTPATIENT
Start: 2022-06-07 | End: 2024-09-25

## 2022-06-07 RX ORDER — TAMSULOSIN HYDROCHLORIDE 0.4 MG/1
0.4 CAPSULE ORAL AT BEDTIME
Qty: 90 CAPSULE | Refills: 3 | Status: SHIPPED | OUTPATIENT
Start: 2022-06-07 | End: 2023-07-07

## 2022-06-07 NOTE — PROGRESS NOTES
Chief Complaint   Patient presents with     ANJELICADUNCAN Beltran is a 64 year old male who presents today for follow up of   Chief Complaint   Patient presents with     MYLENE Beltran is a pleasant 66-year-old gentleman who presents to clinic today for f/u with regard to several urological issues.  The patient is a patient of Dr. Gonzales in the past.  He has history of elevated PSA, BPH and erectile dysfunction.  The patient has had 2 previous prostate biopsies for elevated PSA.  The last one was in 2015 by Dr. Gonzales.  He also had an MRI of the prostate that did not show any obvious nodule.  His most recent PSA is 4.47 stable.   He was  also on flomax/proscar.  Prostate size was 78 gms.  He is on viagra for ED.    Current Outpatient Medications   Medication Sig Dispense Refill     amLODIPine (NORVASC) 2.5 MG tablet Take 1 tablet (2.5 mg) by mouth 2 times daily 180 tablet 3     aspirin 81 MG tablet Take 81 mg by mouth daily       atorvastatin (LIPITOR) 40 MG tablet Take 1 tablet (40 mg) by mouth daily 90 tablet 3     carvedilol (COREG) 25 MG tablet Take 1 tablet (25 mg) by mouth 2 times daily (with meals) 180 tablet 3     finasteride (PROSCAR) 5 MG tablet Take 1 tablet (5 mg) by mouth daily 90 tablet 3     lisinopril (ZESTRIL) 20 MG tablet Take 1 tablet (20 mg) by mouth 2 times daily 180 tablet 3     sildenafil (VIAGRA) 100 MG cap/tab Take 1 tablet (100 mg) by mouth daily as needed for erectile dysfunction 60 tablet 3     tamsulosin (FLOMAX) 0.4 MG capsule Take 1 capsule (0.4 mg) by mouth At Bedtime 90 capsule 3     Allergies   Allergen Reactions     Nka [No Known Allergies]       Past Medical History:   Diagnosis Date     Atrial fibrillation (H) 9/23/10     CARDIAC DYSRHYTHMIAS NEC 4/3/2008     Cardiomyopathy, idiopathic (H)      Coronary artery disease     Stent     Elevated PSA 10/7/2010     HTN (hypertension) 4/25/2011     Hyperlipidemia      Hypertension      TYLER (obstructive sleep apnea)      CPAP     Shortness of breath      Past Surgical History:   Procedure Laterality Date     CARDIAC CATHERIZATION  2011    mid RCA stenosis of 85-90%-BMS     COLONOSCOPY N/A 6/5/2019    Procedure: COLONOSCOPY;  Surgeon: Kevin Andujar DO;  Location: [facility]     H ABLATION FOCAL AFIB  3/27/14     ORTHOPEDIC SURGERY      knee- both arthrocsopic     ORTHOPEDIC SURGERY      left pinky finger     REMOVE FOREIGN BODY FINGER Left 2/7/2017    Procedure: REMOVE FOREIGN BODY FINGER;  Surgeon: Adilene Mcdonald MD;  Location: [facility]     SURGICAL HISTORY OF -       right knee arthoscopic     SURGICAL HISTORY OF -       left 5th finger surgery     Family History   Problem Relation Age of Onset     Diabetes Father      Cerebrovascular Disease Father      Unknown/Adopted Maternal Grandfather      Unknown/Adopted Paternal Grandmother      Cancer Paternal Grandfather         unknown     Cardiovascular Mother         had pacemaker placed unknown reason why     Social History     Social History     Marital status:      Spouse name: N/A     Number of children: N/A     Years of education: N/A     Social History Main Topics     Smoking status: Never Smoker     Smokeless tobacco: Never Used     Alcohol use Yes      Comment: 2-3 drinks per week     Drug use: No     Sexual activity: Yes     Partners: Female     Other Topics Concern     Parent/Sibling W/ Cabg, Mi Or Angioplasty Before 65f 55m? No     Caffeine Concern Yes     2 cups caffeine per day     Sleep Concern Yes     sleep apnea, wears cpap at night     Stress Concern No     Weight Concern No     Special Diet No     Back Care No     Exercise Yes     knee exercises, weights     Social History Narrative       REVIEW OF SYSTEMS  =================  C: NEGATIVE for fever, chills, change in weight  I: NEGATIVE for worrisome rashes, moles or lesions  E/M: NEGATIVE for ear, mouth and throat problems  R: NEGATIVE for significant cough or SHORTNESS OF BREATH,   CV: NEGATIVE for  chest pain, palpitations or peripheral edema  GI: NEGATIVE for nausea, abdominal pain, heartburn, or change in bowel habits  NEURO: NEGATIVE any motor/sensory changes  PSYCH: NEGATIVE for recent mood disorder    Physical Exam:  BP (!) 171/89 (BP Location: Right arm, Patient Position: Chair, Cuff Size: Adult Regular)   Pulse 61   Temp 97.7  F (36.5  C) (Oral)   SpO2 97%    Patient is pleasant, in no acute distress, good general condition.  Lung: no evidence of respiratory distress    Abdomen: Soft, nondistended, non tender. No masses. No rebound or guarding.   Exam: penis no d/c. Testis no masses.  No scrotal skin lesion. Prostate apex only.  Non tender  Skin: Warm and dry.  No redness.  Psych: normal mood and affect  Neuro: alert and oriented    Assessment/Plan:   (N40.1) Benign prostatic hyperplasia with lower urinary tract symptoms, symptom details unspecified  (primary encounter diagnosis)  Comment: bladder scan 0 ml  Plan: cont with flomax/proscar    (R97.20) Elevated prostate specific antigen (PSA)  Comment: stable  Plan: PSA, tumor marker        In 12 months    ED: cont with viagra    HTN: Bhavik to follow up with Primary Care provider regarding elevated blood pressure.

## 2022-06-28 ENCOUNTER — ALLIED HEALTH/NURSE VISIT (OUTPATIENT)
Dept: FAMILY MEDICINE | Facility: CLINIC | Age: 67
End: 2022-06-28
Payer: COMMERCIAL

## 2022-06-28 DIAGNOSIS — Z23 NEED FOR VACCINATION: Primary | ICD-10-CM

## 2022-06-28 PROCEDURE — 90471 IMMUNIZATION ADMIN: CPT

## 2022-06-28 NOTE — NURSING NOTE
Prior to immunization administration, verified patients identity using patient s name and date of birth. Please see Immunization Activity for additional information.     Screening Questionnaire for Adult Immunization    Are you sick today?   No   Do you have allergies to medications, food, a vaccine component or latex?   No   Have you ever had a serious reaction after receiving a vaccination?   No   Do you have a long-term health problem with heart, lung, kidney, or metabolic disease (e.g., diabetes), asthma, a blood disorder, no spleen, complement component deficiency, a cochlear implant, or a spinal fluid leak?  Are you on long-term aspirin therapy?   No   Do you have cancer, leukemia, HIV/AIDS, or any other immune system problem?   No   Do you have a parent, brother, or sister with an immune system problem?   No   In the past 3 months, have you taken medications that affect  your immune system, such as prednisone, other steroids, or anticancer drugs; drugs for the treatment of rheumatoid arthritis, Crohn s disease, or psoriasis; or have you had radiation treatments?   No   Have you had a seizure, or a brain or other nervous system problem?   No   During the past year, have you received a transfusion of blood or blood    products, or been given immune (gamma) globulin or antiviral drug?   No   For women: Are you pregnant or is there a chance you could become       pregnant during the next month?   No   Have you received any vaccinations in the past 4 weeks?   No     Immunization questionnaire answers were all negative.        Per orders of Dr. Fisher, injection of Shingrix given by Carmella Robert CMA. Patient instructed to remain in clinic for 15 minutes afterwards, and to report any adverse reaction to me immediately.       Screening performed by Camrella Robert CMA on 6/28/2022 at 10:23 AM.

## 2022-07-11 NOTE — NURSING NOTE
Shingles vaccine purchased in [facility] and I went to pick it up from them and administered the vaccine.    Carmella Robert MA

## 2022-08-10 ENCOUNTER — TRANSFERRED RECORDS (OUTPATIENT)
Dept: FAMILY MEDICINE | Facility: CLINIC | Age: 67
End: 2022-08-10

## 2022-08-17 DIAGNOSIS — I10 BENIGN ESSENTIAL HTN: ICD-10-CM

## 2022-08-17 RX ORDER — CARVEDILOL 25 MG/1
25 TABLET ORAL 2 TIMES DAILY WITH MEALS
Qty: 180 TABLET | Refills: 3 | Status: SHIPPED | OUTPATIENT
Start: 2022-08-17 | End: 2023-07-25

## 2022-08-30 ENCOUNTER — TELEPHONE (OUTPATIENT)
Dept: CARDIOLOGY | Facility: CLINIC | Age: 67
End: 2022-08-30

## 2022-08-30 NOTE — TELEPHONE ENCOUNTER
[place] Community Measures Blood Pressure guideline reviewed.  Patients recent blood pressure is outside of guideline parameters.  Called pt to review, no answer.  Left voicemail message asking patient to check their blood pressure using a home blood pressure cuff or by going to a [facility].  Patient instructed to then call [phone] (Rae) and leave a message with their name, date of birth, and blood pressure reading that was completed within the last 24 hours and where it was completed.  Will await call back for further review.    KASANDRA Scott

## 2022-09-29 ENCOUNTER — TELEPHONE (OUTPATIENT)
Dept: CARDIOLOGY | Facility: CLINIC | Age: 67
End: 2022-09-29

## 2022-09-29 DIAGNOSIS — I48.19 PERSISTENT ATRIAL FIBRILLATION (H): ICD-10-CM

## 2022-09-29 RX ORDER — AMLODIPINE BESYLATE 2.5 MG/1
2.5 TABLET ORAL 2 TIMES DAILY
Qty: 60 TABLET | Refills: 1 | Status: SHIPPED | OUTPATIENT
Start: 2022-09-29 | End: 2022-09-29

## 2022-09-29 RX ORDER — AMLODIPINE BESYLATE 2.5 MG/1
2.5 TABLET ORAL 2 TIMES DAILY
Qty: 180 TABLET | Refills: 3 | Status: SHIPPED | OUTPATIENT
Start: 2022-09-29 | End: 2023-07-25

## 2022-09-29 NOTE — TELEPHONE ENCOUNTER
9/29/22 Spoke w pt who stated he has been out of med for about 6 weeks. He contacted his mail order pharmacy ( [facility]) who said they never recd the rx that as sent in 5/2022  Will send short term supply to [facility] per pt request and then re-send 3 m supply w 3 refills to [facility]  Pt voiced understanding and agreement with plan.   Patricia 656 am

## 2022-09-29 NOTE — TELEPHONE ENCOUNTER
[facility] Call Center    Phone Message    May a detailed message be left on voicemail: yes     Reason for Call: Medication Refill Request    Has the patient contacted the pharmacy for the refill? Yes   Name of medication being requested: amlodipine 2.5 mg  Provider who prescribed the medication: Moriah Bruce  Pharmacy: [facility]  Date medication is needed: 09/29/2022   Pharmacy stated meds were denied and pt has been with out for over a month, Please reach out to pt with any questions or concerns      Action Taken: Message routed to:  [facility] ([facility] Cardio    Travel Screening: Not Applicable

## 2022-10-23 ENCOUNTER — HEALTH MAINTENANCE LETTER (OUTPATIENT)
Age: 67
End: 2022-10-23

## 2023-04-02 ENCOUNTER — HEALTH MAINTENANCE LETTER (OUTPATIENT)
Age: 68
End: 2023-04-02

## 2023-05-31 ENCOUNTER — TELEPHONE (OUTPATIENT)
Dept: CARDIOLOGY | Facility: CLINIC | Age: 68
End: 2023-05-31
Payer: COMMERCIAL

## 2023-05-31 DIAGNOSIS — I10 HYPERTENSION, UNSPECIFIED TYPE: ICD-10-CM

## 2023-05-31 DIAGNOSIS — E78.49 OTHER HYPERLIPIDEMIA: ICD-10-CM

## 2023-05-31 DIAGNOSIS — I48.19 PERSISTENT ATRIAL FIBRILLATION (H): ICD-10-CM

## 2023-05-31 DIAGNOSIS — I10 BENIGN ESSENTIAL HTN: Primary | ICD-10-CM

## 2023-05-31 NOTE — TELEPHONE ENCOUNTER
[facility] Call [facility]    Phone Message    May a detailed message be left on voicemail: yes     Reason for Call: Order(s): Other:   Reason for requested: Labs for yearly appt  Date needed: prior to scheduled appt mid July  Provider name: Geneva Bruce      Action Taken: Message routed to:  [facility] cardio    Travel Screening: Not Applicable     Thank you!  [facility]

## 2023-05-31 NOTE — CONFIDENTIAL NOTE
Routing to Geneva Bruce,    Patient has an appointment in July 2023 with you. Should he have an EKG, echo or any other scan completed before his OV?    Last Holter in April 2021  Last EKG in Feb 2020  Nuc med echo Nov 2017  Last Echo in Sept 2017    Cardiac Hx  1. Sx'c persistent AFib with failure of sotalol and Multaq, s/p ablation 3/2014. Without recurrence  2. Benign Essential HTN  3. CAD s/p RCA stent 2011   4. TYLER on CPAP therapy  5. CHADSVASc 3 (HTN, age, CAD); he's opted to not be on AC    Please advise.     Kenyon Jackson RN

## 2023-06-01 NOTE — TELEPHONE ENCOUNTER
It looks like Dr. Ahn saw him 5/2022 and mentioned wearing a monitor, but did not order this.      At this point, unless he has had any issues since he last saw Dr. Ahn, would only plan to get an EKG when I see him.  Other testing would be advised if he had any symptoms noted during her visit    Please arrange the EKG!  Thanks-Geneva

## 2023-06-01 NOTE — TELEPHONE ENCOUNTER
Patient was called to reassess if he was having symptoms and stated that he was not. Yearly EKG indicated on same day at this time. Labs and EKG ordered, appt notes updated. Scheduling messaged. Kenyon Jackson RN

## 2023-07-07 ENCOUNTER — TELEPHONE (OUTPATIENT)
Dept: UROLOGY | Facility: CLINIC | Age: 68
End: 2023-07-07
Payer: COMMERCIAL

## 2023-07-07 ENCOUNTER — TELEPHONE (OUTPATIENT)
Dept: CARDIOLOGY | Facility: CLINIC | Age: 68
End: 2023-07-07
Payer: COMMERCIAL

## 2023-07-07 DIAGNOSIS — N13.8 BPH WITH URINARY OBSTRUCTION: ICD-10-CM

## 2023-07-07 DIAGNOSIS — I10 BENIGN ESSENTIAL HTN: ICD-10-CM

## 2023-07-07 DIAGNOSIS — E78.49 OTHER HYPERLIPIDEMIA: ICD-10-CM

## 2023-07-07 DIAGNOSIS — N40.1 BPH WITH URINARY OBSTRUCTION: ICD-10-CM

## 2023-07-07 RX ORDER — LISINOPRIL 20 MG/1
20 TABLET ORAL 2 TIMES DAILY
Qty: 180 TABLET | Refills: 0 | Status: SHIPPED | OUTPATIENT
Start: 2023-07-07 | End: 2023-07-25

## 2023-07-07 RX ORDER — ATORVASTATIN CALCIUM 40 MG/1
40 TABLET, FILM COATED ORAL DAILY
Qty: 90 TABLET | Refills: 0 | Status: SHIPPED | OUTPATIENT
Start: 2023-07-07 | End: 2023-09-29

## 2023-07-07 RX ORDER — TAMSULOSIN HYDROCHLORIDE 0.4 MG/1
0.4 CAPSULE ORAL AT BEDTIME
Qty: 90 CAPSULE | Refills: 0 | Status: SHIPPED | OUTPATIENT
Start: 2023-07-07 | End: 2023-08-17

## 2023-07-07 NOTE — TELEPHONE ENCOUNTER
7/7/23 [facility] Refill Guide Reviewed     Received refill request for: Lisinopril 20 mg BID, Atorvastatin 40 mg   Last OV was: 5/17/22  Labs/EKG: lab ordered for 7/19/23  F/U scheduled : 7/25/23  Rx sent to: [facility]  Patricia 1211 pm

## 2023-07-07 NOTE — TELEPHONE ENCOUNTER
[facility] Call Center    Phone Message    May a detailed message be left on voicemail: yes     Reason for Call: Medication Refill Request    Has the patient contacted the pharmacy for the refill? Yes   Name of medication being requested: lisinopril (ZESTRIL) 20 MG tablet 90 day supply  atorvastatin (LIPITOR) 40 MG tablet  Provider who prescribed the medication: Dr. Ahn  Pharmacy:    [facility]    Date medication is needed: 07/10/23         Action Taken: Other: cardiology    Travel Screening: Not Applicable   Thank you!  [facility]

## 2023-07-07 NOTE — TELEPHONE ENCOUNTER
ONE TIME SHEEBA Refill Flomax. Last OV= 06/2022. My chart message sent to patient to schedule an appointment for future fills.    Korina WATTS RN Urology 7/7/2023 3:19 PM

## 2023-07-07 NOTE — TELEPHONE ENCOUNTER
[facility] Call Center    Phone Message    May a detailed message be left on voicemail: yes     Reason for Call: Medication Refill Request    Has the patient contacted the pharmacy for the refill? Yes   Name of medication being requested: tamsulosin (FLOMAX) 0.4 MG capsule  Provider who prescribed the medication: Shy  Pharmacy: [facility] [place] - [address] AT [address]  Date medication is needed: ASAP     Action Taken: Message routed to:  Other: Uro    Travel Screening: Not Applicable

## 2023-07-11 DIAGNOSIS — I10 BENIGN ESSENTIAL HTN: ICD-10-CM

## 2023-07-11 RX ORDER — LISINOPRIL 20 MG/1
20 TABLET ORAL 2 TIMES DAILY
Qty: 180 TABLET | Refills: 0 | OUTPATIENT
Start: 2023-07-11

## 2023-07-11 NOTE — TELEPHONE ENCOUNTER
Last Office Visit: 5/17/22 Ahn   Next Office Visit: 7/25/23 Vito   Last Fill Date: Not listed on fax     Shreya Mcfarland CMA 7/11/2023 8:21 AM

## 2023-07-19 ENCOUNTER — LAB (OUTPATIENT)
Dept: LAB | Facility: CLINIC | Age: 68
End: 2023-07-19
Payer: COMMERCIAL

## 2023-07-19 DIAGNOSIS — R97.20 ELEVATED PSA: ICD-10-CM

## 2023-07-19 DIAGNOSIS — I10 HYPERTENSION, UNSPECIFIED TYPE: ICD-10-CM

## 2023-07-19 DIAGNOSIS — I10 BENIGN ESSENTIAL HTN: ICD-10-CM

## 2023-07-19 DIAGNOSIS — E78.49 OTHER HYPERLIPIDEMIA: ICD-10-CM

## 2023-07-19 LAB
ALT SERPL W P-5'-P-CCNC: 22 U/L (ref 0–70)
ANION GAP SERPL CALCULATED.3IONS-SCNC: 10 MMOL/L (ref 7–15)
BUN SERPL-MCNC: 22.7 MG/DL (ref 8–23)
CALCIUM SERPL-MCNC: 9.4 MG/DL (ref 8.8–10.2)
CHLORIDE SERPL-SCNC: 102 MMOL/L (ref 98–107)
CHOLEST SERPL-MCNC: 168 MG/DL
CREAT SERPL-MCNC: 0.94 MG/DL (ref 0.67–1.17)
DEPRECATED HCO3 PLAS-SCNC: 26 MMOL/L (ref 22–29)
GFR SERPL CREATININE-BSD FRML MDRD: 89 ML/MIN/1.73M2
GLUCOSE SERPL-MCNC: 125 MG/DL (ref 70–99)
HDLC SERPL-MCNC: 36 MG/DL
LDLC SERPL CALC-MCNC: 108 MG/DL
NONHDLC SERPL-MCNC: 132 MG/DL
POTASSIUM SERPL-SCNC: 4 MMOL/L (ref 3.4–5.3)
PSA SERPL DL<=0.01 NG/ML-MCNC: 4.67 NG/ML (ref 0–4.5)
SODIUM SERPL-SCNC: 138 MMOL/L (ref 136–145)
TRIGL SERPL-MCNC: 118 MG/DL

## 2023-07-19 PROCEDURE — 84153 ASSAY OF PSA TOTAL: CPT

## 2023-07-19 PROCEDURE — 80061 LIPID PANEL: CPT | Performed by: PHYSICIAN ASSISTANT

## 2023-07-19 PROCEDURE — 80048 BASIC METABOLIC PNL TOTAL CA: CPT | Performed by: PHYSICIAN ASSISTANT

## 2023-07-19 PROCEDURE — 84460 ALANINE AMINO (ALT) (SGPT): CPT | Performed by: PHYSICIAN ASSISTANT

## 2023-07-19 PROCEDURE — 36415 COLL VENOUS BLD VENIPUNCTURE: CPT

## 2023-07-20 NOTE — PROGRESS NOTES
"[facility]    I had the pleasure of seeing Doron when he came for follow up of AFib.  This 67 year old sees Dr. Ahn for his history of:    1. Sx'c persistent AFib with failure of sotalol and Multaq, s/p ablation 3/2014. No recurrence  2. Benign Essential HTN  3. CAD s/p RCA stent 2011   4. TYLER on CPAP therapy  5. CHADSVASc 3 (HTN, age, CAD); he's opted to not be on AC        Last Visit & Interval History:  I last saw Bhaivk 4/2021 at which time he was doing well.  He was building a house while in [place], and is having no limitations.  He had not had recurrent atrial fibrillation.  No changes were made and annual follow-up recommended.  Dr. Ahn saw him 5/2022 at which time he reviewed that he had not had any recurrent atrial fibrillation and annual follow-up recommended.      Today's Visit:  Bhavik is feeling good!  No issues with recurrent AFib that he is aware of.  No palpitations.  Denies dizziness or lightheadedness.    He remains active, but not at the level he was back in 2021 when he was building a home.  We reviewed his cholesterol today and noted HDL has dropped since then, and LDL has increased despite continued atorvastatin use.    Denies edema, orthopnea, PND.  No chest pain, pressure, tightness.  Overall, doing well and having no issues with medications    VITALS:  Vitals: /78   Pulse 63   Resp 16   Ht 1.803 m (5' 11\")   Wt 107 kg (236 lb)   SpO2 97%   BMI 32.92 kg/m      Diagnostic Testing:  EKG today, which I overread, showed SR 61 bpm  ZioPatch 4/1-8/2021 while on Carvedilol 25 mg BID showed SR with 1 run of PSVT.  In SR, avg HR 68 bpm (range 52-99 bpm).  14 beat run of SVT noted unit(s) pto 122 bpm, avg 112 bpm. Asx'c. <1% PACs/PVCs   Echocardiogam 9/2017 showed EF 55%. Grade 3 diastolic dysfunction. RWMA with mild distal lateral/high-lateral hypokinesis. RV wnl. LA borderline dilated. 1+ MR.   Nuclear Stress Test 11/2017 showed no significant ischemia, while reaching only " a 71% MPHR  Component      Latest Ref Rng 7/19/2023  8:04 AM   Sodium      136 - 145 mmol/L 138    Potassium      3.4 - 5.3 mmol/L 4.0    Chloride      98 - 107 mmol/L 102    Carbon Dioxide (CO2)      22 - 29 mmol/L 26    Anion Gap      7 - 15 mmol/L 10    Urea Nitrogen      8.0 - 23.0 mg/dL 22.7    Creatinine      0.67 - 1.17 mg/dL 0.94    Calcium      8.8 - 10.2 mg/dL 9.4    Glucose      70 - 99 mg/dL 125 (H)    GFR Estimate      >60 mL/min/1.73m2 89       Component      Latest Ref Rng 7/19/2023  8:04 AM   Cholesterol      <200 mg/dL 168    Triglycerides      <150 mg/dL 118    HDL Cholesterol      >=40 mg/dL 36 (L)    LDL Cholesterol Calculated      <=100 mg/dL 108 (H)    Non HDL Cholesterol      <130 mg/dL 132 (H)       Component      Latest Ref Rng 7/19/2023  8:04 AM   ALT      0 - 70 U/L 22          Plan:  Annual follow-up with fasting lipids, BMP, CBC  Echo 1 year      Assessment/Plan:    Persistent AFib  S/p AFib ablation 2014  [facility] 4/2021 without recurrent atrial fibrillation.  No recent symptoms to suggest recurrent atrial fibrillation  Remains on carvedilol  EKG today with SR  Has refused AC despite [id number] 3 (HTN, age, CAD) given no recurrent atrial fibrillation    PLAN:  Continue to monitor  Annual follow-up, but to contact us with any recurrent palpitations      CAD, dyslipidemia  S/p RCA stent 2011  Remains on BB, ACE, statin and ASA 81 mg daily    PLAN:  Continue current medications  Echocardiogram 1 year      Dyslipidemia  As above, remains on atorvastatin 40 mg daily.  LDL up to 108 mg/dL.  Also note HDL down to 36 mg/dL  Reviewed that back in 2021 when he was very active, HDL and LDL were at goal  Note that fasting blood sugar significantly increased    PLAN:  Get back in to PCP's office to ensure no DM dx  Continue atorvastatin 40 mg daily  Increase exercise  Limit saturated fats (see AVS)    4. HTN  Lisinopril 20 mg daily, amlodipine 2.5 mg BID carvedilol 25 mg BID  BP today looks  great    PLAN:    Continue current medications. Refilled     Geneva Bruce PA-C, MSPAS      Orders Placed This Encounter   Procedures    Basic metabolic panel    Lipid panel reflex to direct LDL Fasting    Follow-Up with Cardiology [facility]RA    EKG 12-lead complete w/read - Clinics    Echocardiogram Complete    CBC with platelets differential     Orders Placed This Encounter   Medications    carvedilol (COREG) 25 MG tablet     Sig: Take 1 tablet (25 mg) by mouth 2 times daily (with meals)     Dispense:  180 tablet     Refill:  3    lisinopril (ZESTRIL) 20 MG tablet     Sig: Take 1 tablet (20 mg) by mouth 2 times daily     Dispense:  180 tablet     Refill:  3    amLODIPine (NORVASC) 2.5 MG tablet     Sig: Take 1 tablet (2.5 mg) by mouth 2 times daily     Dispense:  180 tablet     Refill:  3     Medications Discontinued During This Encounter   Medication Reason    carvedilol (COREG) 25 MG tablet Reorder (No AVS)    amLODIPine (NORVASC) 2.5 MG tablet Reorder (No AVS)    lisinopril (ZESTRIL) 20 MG tablet Reorder (No AVS)         Encounter Diagnoses   Name Primary?    Persistent atrial fibrillation (H) Yes    Other hyperlipidemia     Benign essential HTN        CURRENT MEDICATIONS:  Current Outpatient Medications   Medication Sig Dispense Refill    amLODIPine (NORVASC) 2.5 MG tablet Take 1 tablet (2.5 mg) by mouth 2 times daily 180 tablet 3    aspirin 81 MG tablet Take 81 mg by mouth daily      atorvastatin (LIPITOR) 40 MG tablet Take 1 tablet (40 mg) by mouth daily 90 tablet 0    carvedilol (COREG) 25 MG tablet Take 1 tablet (25 mg) by mouth 2 times daily (with meals) 180 tablet 3    finasteride (PROSCAR) 5 MG tablet Take 1 tablet (5 mg) by mouth daily 90 tablet 3    lisinopril (ZESTRIL) 20 MG tablet Take 1 tablet (20 mg) by mouth 2 times daily 180 tablet 3    sildenafil (VIAGRA) 100 MG tablet Take 1 tablet (100 mg) by mouth daily as needed (sex) 30 tablet 4    tamsulosin (FLOMAX) 0.4 MG capsule Take 1 capsule (0.4 mg) by mouth  "At Bedtime 90 capsule 0       ALLERGIES     Allergies   Allergen Reactions    Nka [No Known Allergies]          Review of Systems:  Skin:        Eyes:       ENT:       Respiratory:  Negative for shortness of breath;dyspnea on exertion  Cardiovascular:  Negative for;palpitations;chest pain;edema;syncope or near-syncope;fatigue;lightheadedness;dizziness    Gastroenterology:      Genitourinary:       Musculoskeletal:       Neurologic:       Psychiatric:       Heme/Lymph/Imm:       Endocrine:         Physical Exam:  Vitals: /78   Pulse 63   Resp 16   Ht 1.803 m (5' 11\")   Wt 107 kg (236 lb)   SpO2 97%   BMI 32.92 kg/m      Constitutional:  cooperative, alert and oriented, well developed, well nourished, in no acute distress overweight      Skin:  warm and dry to the touch, no apparent skin lesions or masses noted   facial erythema    Head:  normocephalic, no masses or lesions        Eyes:  pupils equal and round;sclera white;conjunctivae and lids unremarkable        ENT:  no pallor or cyanosis        Neck:  JVP normal        Chest:  normal breath sounds, clear to auscultation, normal A-P diameter, normal symmetry, normal respiratory excursion, no use of accessory muscles        Cardiac: regular rhythm;no murmurs, gallops or rubs detected                  Abdomen:  abdomen soft        Vascular: pulses full and equal                                      Extremities and Back:  no deformities, clubbing, cyanosis, erythema observed;no edema        Neurological:  no gross motor deficits            PAST MEDICAL HISTORY:  Past Medical History:   Diagnosis Date    Atrial fibrillation (H) 9/23/10    CARDIAC DYSRHYTHMIAS [facility] 4/3/2008    Cardiomyopathy, idiopathic (H)     Coronary artery disease     Stent    Elevated PSA 10/7/2010    HTN (hypertension) 4/25/2011    Hyperlipidemia     Hypertension     TYLER (obstructive sleep apnea)     CPAP    Shortness of breath        PAST SURGICAL HISTORY:  Past Surgical History: "   Procedure Laterality Date    CARDIAC CATHERIZATION  2011    mid RCA stenosis of 85-90%-BMS    COLONOSCOPY N/A 6/5/2019    Procedure: COLONOSCOPY;  Surgeon: Kevin Andujar DO;  Location: [facility]    H ABLATION FOCAL AFIB  3/27/14    ORTHOPEDIC SURGERY      knee- both arthrocsopic    ORTHOPEDIC SURGERY      left pinky finger    REMOVE FOREIGN BODY FINGER Left 2/7/2017    Procedure: REMOVE FOREIGN BODY FINGER;  Surgeon: Adilene Mcdonald MD;  Location: [facility]    SURGICAL HISTORY OF -       right knee arthoscopic    SURGICAL HISTORY OF -       left 5th finger surgery       FAMILY HISTORY:  Family History   Problem Relation Age of Onset    Diabetes Father     Cerebrovascular Disease Father     Unknown/Adopted Maternal Grandfather     Unknown/Adopted Paternal Grandmother     Cancer Paternal Grandfather         unknown    Cardiovascular Mother         had pacemaker placed unknown reason why       SOCIAL HISTORY:  Social History     Socioeconomic History    Marital status:      Spouse name: None    Number of children: None    Years of education: None    Highest education level: None   Tobacco Use    Smoking status: Never    Smokeless tobacco: Never   Substance and Sexual Activity    Alcohol use: Yes     Comment: 2-3 drinks per week    Drug use: No    Sexual activity: Yes     Partners: Female   Other Topics Concern    Parent/sibling w/ CABG, MI or angioplasty before 65F 55M? No    Caffeine Concern Yes     Comment: 2 cups caffeine per day    Sleep Concern Yes     Comment: sleep apnea, wears cpap at night    Stress Concern No    Weight Concern No    Special Diet No    Back Care No    Exercise Yes     Comment: knee exercises, weights

## 2023-07-25 ENCOUNTER — OFFICE VISIT (OUTPATIENT)
Dept: CARDIOLOGY | Facility: CLINIC | Age: 68
End: 2023-07-25
Payer: COMMERCIAL

## 2023-07-25 VITALS
HEIGHT: 71 IN | WEIGHT: 236 LBS | BODY MASS INDEX: 33.04 KG/M2 | DIASTOLIC BLOOD PRESSURE: 78 MMHG | SYSTOLIC BLOOD PRESSURE: 127 MMHG | OXYGEN SATURATION: 97 % | RESPIRATION RATE: 16 BRPM | HEART RATE: 63 BPM

## 2023-07-25 DIAGNOSIS — I10 BENIGN ESSENTIAL HTN: ICD-10-CM

## 2023-07-25 DIAGNOSIS — E78.49 OTHER HYPERLIPIDEMIA: ICD-10-CM

## 2023-07-25 DIAGNOSIS — I48.19 PERSISTENT ATRIAL FIBRILLATION (H): Primary | ICD-10-CM

## 2023-07-25 PROCEDURE — 93000 ELECTROCARDIOGRAM COMPLETE: CPT | Performed by: PHYSICIAN ASSISTANT

## 2023-07-25 PROCEDURE — 99214 OFFICE O/P EST MOD 30 MIN: CPT | Performed by: PHYSICIAN ASSISTANT

## 2023-07-25 RX ORDER — CARVEDILOL 25 MG/1
25 TABLET ORAL 2 TIMES DAILY WITH MEALS
Qty: 180 TABLET | Refills: 3 | Status: SHIPPED | OUTPATIENT
Start: 2023-07-25 | End: 2024-01-04

## 2023-07-25 RX ORDER — LISINOPRIL 20 MG/1
20 TABLET ORAL 2 TIMES DAILY
Qty: 180 TABLET | Refills: 3 | Status: SHIPPED | OUTPATIENT
Start: 2023-07-25 | End: 2024-01-04

## 2023-07-25 RX ORDER — AMLODIPINE BESYLATE 2.5 MG/1
2.5 TABLET ORAL 2 TIMES DAILY
Qty: 180 TABLET | Refills: 3 | Status: SHIPPED | OUTPATIENT
Start: 2023-07-25 | End: 2024-01-04

## 2023-07-25 NOTE — PATIENT INSTRUCTIONS
"Bhavik - it was nice to see you today!     At your visit today we reviewed:    Heart-wise doing well!  BP and EKG today look great!     Medication Changes:    No changes needed today to meds    Recommendations:    Reviewed cholesterol - HDL (good chol) getting lower with less exercise and LDL (bad chol) getting too high with less exercise/some changes in diet  For HDL/good cholesterol (goal >40 in men)  Exercise - aim for at least 150 minutes of AEROBIC exercise per week  Increase intake of nuts (1 oz of walnuts, cashews, almonds or pistachios per day)   For LDL/bad cholesterol (goal <)   Limit saturated fat in diet (from animal products)    Red meat    High fat dairy products, like cheese, ice cream, butter, etc    Processed foods (creamy salad dressings, peanut butter ... Check      serving size!!)    Restaurant foods    Limit \"junk\" - crackers, chips, cookies, etc     Exercise - aim for at least 150 minutes of AEROBIC exercise per week      Follow-up:    See us for cardiology follow up in 1 year with fasting labs and echo but CALL Cardiology nurses [facility] @ [phone] for any issues, questions or concerns in the interim.      To schedule a future appointment, we kindly ask that you call cardiology scheduling at [phone] three months prior to requested visit date.    Important Phone Numbers for [facility] ([place]):    [place] [facility]: [phone]  Cardiology [phone]  [place] [facility] [phone]  [place] [facility] [phone]  [place] [facility]: [phone]      "

## 2023-07-25 NOTE — LETTER
"7/25/2023    Fahad Fisher MD  [address] [facility] [address]    RE: Doron Feldman       Dear Colleague,     I had the pleasure of seeing Doron Feldman in the [facility].  [facility]    I had the pleasure of seeing Doron when he came for follow up of AFib.  This 67 year old sees Dr. Ahn for his history of:    1. Sx'c persistent AFib with failure of sotalol and Multaq, s/p ablation 3/2014. No recurrence  2. Benign Essential HTN  3. CAD s/p RCA stent 2011   4. TYLER on CPAP therapy  5. CHADSVASc 3 (HTN, age, CAD); he's opted to not be on AC        Last Visit & Interval History:  I last saw Bhavik 4/2021 at which time he was doing well.  He was building a house while in [place], and is having no limitations.  He had not had recurrent atrial fibrillation.  No changes were made and annual follow-up recommended.  Dr. Ahn saw him 5/2022 at which time he reviewed that he had not had any recurrent atrial fibrillation and annual follow-up recommended.      Today's Visit:  Bhavik is feeling good!  No issues with recurrent AFib that he is aware of.  No palpitations.  Denies dizziness or lightheadedness.    He remains active, but not at the level he was back in 2021 when he was building a home.  We reviewed his cholesterol today and noted HDL has dropped since then, and LDL has increased despite continued atorvastatin use.    Denies edema, orthopnea, PND.  No chest pain, pressure, tightness.  Overall, doing well and having no issues with medications    VITALS:  Vitals: /78   Pulse 63   Resp 16   Ht 1.803 m (5' 11\")   Wt 107 kg (236 lb)   SpO2 97%   BMI 32.92 kg/m      Diagnostic Testing:  EKG today, which I overread, showed SR 61 bpm  ZioPatch 4/1-8/2021 while on Carvedilol 25 mg BID showed SR with 1 run of PSVT.  In SR, avg HR 68 bpm (range 52-99 bpm).  14 beat run of SVT noted unit(s) pto 122 bpm, avg 112 bpm. Asx'c. <1% PACs/PVCs   Echocardiogam 9/2017 showed EF 55%. " Grade 3 diastolic dysfunction. RWMA with mild distal lateral/high-lateral hypokinesis. RV wnl. LA borderline dilated. 1+ MR.   Nuclear Stress Test 11/2017 showed no significant ischemia, while reaching only a 71% MPHR  Component      Latest Ref Rng 7/19/2023  8:04 AM   Sodium      136 - 145 mmol/L 138    Potassium      3.4 - 5.3 mmol/L 4.0    Chloride      98 - 107 mmol/L 102    Carbon Dioxide (CO2)      22 - 29 mmol/L 26    Anion Gap      7 - 15 mmol/L 10    Urea Nitrogen      8.0 - 23.0 mg/dL 22.7    Creatinine      0.67 - 1.17 mg/dL 0.94    Calcium      8.8 - 10.2 mg/dL 9.4    Glucose      70 - 99 mg/dL 125 (H)    GFR Estimate      >60 mL/min/1.73m2 89       Component      Latest Ref Rng 7/19/2023  8:04 AM   Cholesterol      <200 mg/dL 168    Triglycerides      <150 mg/dL 118    HDL Cholesterol      >=40 mg/dL 36 (L)    LDL Cholesterol Calculated      <=100 mg/dL 108 (H)    Non HDL Cholesterol      <130 mg/dL 132 (H)       Component      Latest Ref Rng 7/19/2023  8:04 AM   ALT      0 - 70 U/L 22          Plan:  Annual follow-up with fasting lipids, BMP, CBC  Echo 1 year      Assessment/Plan:    Persistent AFib  S/p AFib ablation 2014  [facility] 4/2021 without recurrent atrial fibrillation.  No recent symptoms to suggest recurrent atrial fibrillation  Remains on carvedilol  EKG today with SR  Has refused AC despite [id number] 3 (HTN, age, CAD) given no recurrent atrial fibrillation    PLAN:  Continue to monitor  Annual follow-up, but to contact us with any recurrent palpitations      CAD, dyslipidemia  S/p RCA stent 2011  Remains on BB, ACE, statin and ASA 81 mg daily    PLAN:  Continue current medications  Echocardiogram 1 year      Dyslipidemia  As above, remains on atorvastatin 40 mg daily.  LDL up to 108 mg/dL.  Also note HDL down to 36 mg/dL  Reviewed that back in 2021 when he was very active, HDL and LDL were at goal  Note that fasting blood sugar significantly increased    PLAN:  Get back in to PCP's  office to ensure no DM dx  Continue atorvastatin 40 mg daily  Increase exercise  Limit saturated fats (see AVS)    4. HTN  Lisinopril 20 mg daily, amlodipine 2.5 mg BID carvedilol 25 mg BID  BP today looks great    PLAN:    Continue current medications. Refilled     Geneva Bruce PA-C, MSPAS      Orders Placed This Encounter   Procedures    Basic metabolic panel    Lipid panel reflex to direct LDL Fasting    Follow-Up with Cardiology [facility]    EKG 12-lead complete w/read - Clinics    Echocardiogram Complete    CBC with platelets differential     Orders Placed This Encounter   Medications    carvedilol (COREG) 25 MG tablet     Sig: Take 1 tablet (25 mg) by mouth 2 times daily (with meals)     Dispense:  180 tablet     Refill:  3    lisinopril (ZESTRIL) 20 MG tablet     Sig: Take 1 tablet (20 mg) by mouth 2 times daily     Dispense:  180 tablet     Refill:  3    amLODIPine (NORVASC) 2.5 MG tablet     Sig: Take 1 tablet (2.5 mg) by mouth 2 times daily     Dispense:  180 tablet     Refill:  3     Medications Discontinued During This Encounter   Medication Reason    carvedilol (COREG) 25 MG tablet Reorder (No AVS)    amLODIPine (NORVASC) 2.5 MG tablet Reorder (No AVS)    lisinopril (ZESTRIL) 20 MG tablet Reorder (No AVS)         Encounter Diagnoses   Name Primary?    Persistent atrial fibrillation (H) Yes    Other hyperlipidemia     Benign essential HTN        CURRENT MEDICATIONS:  Current Outpatient Medications   Medication Sig Dispense Refill    amLODIPine (NORVASC) 2.5 MG tablet Take 1 tablet (2.5 mg) by mouth 2 times daily 180 tablet 3    aspirin 81 MG tablet Take 81 mg by mouth daily      atorvastatin (LIPITOR) 40 MG tablet Take 1 tablet (40 mg) by mouth daily 90 tablet 0    carvedilol (COREG) 25 MG tablet Take 1 tablet (25 mg) by mouth 2 times daily (with meals) 180 tablet 3    finasteride (PROSCAR) 5 MG tablet Take 1 tablet (5 mg) by mouth daily 90 tablet 3    lisinopril (ZESTRIL) 20 MG tablet Take 1 tablet (20 mg) by  "mouth 2 times daily 180 tablet 3    sildenafil (VIAGRA) 100 MG tablet Take 1 tablet (100 mg) by mouth daily as needed (sex) 30 tablet 4    tamsulosin (FLOMAX) 0.4 MG capsule Take 1 capsule (0.4 mg) by mouth At Bedtime 90 capsule 0       ALLERGIES     Allergies   Allergen Reactions    Nka [No Known Allergies]          Review of Systems:  Skin:        Eyes:       ENT:       Respiratory:  Negative for shortness of breath;dyspnea on exertion  Cardiovascular:  Negative for;palpitations;chest pain;edema;syncope or near-syncope;fatigue;lightheadedness;dizziness    Gastroenterology:      Genitourinary:       Musculoskeletal:       Neurologic:       Psychiatric:       Heme/Lymph/Imm:       Endocrine:         Physical Exam:  Vitals: /78   Pulse 63   Resp 16   Ht 1.803 m (5' 11\")   Wt 107 kg (236 lb)   SpO2 97%   BMI 32.92 kg/m      Constitutional:  cooperative, alert and oriented, well developed, well nourished, in no acute distress overweight      Skin:  warm and dry to the touch, no apparent skin lesions or masses noted   facial erythema    Head:  normocephalic, no masses or lesions        Eyes:  pupils equal and round;sclera white;conjunctivae and lids unremarkable        ENT:  no pallor or cyanosis        Neck:  JVP normal        Chest:  normal breath sounds, clear to auscultation, normal A-P diameter, normal symmetry, normal respiratory excursion, no use of accessory muscles        Cardiac: regular rhythm;no murmurs, gallops or rubs detected                  Abdomen:  abdomen soft        Vascular: pulses full and equal                                      Extremities and Back:  no deformities, clubbing, cyanosis, erythema observed;no edema        Neurological:  no gross motor deficits            PAST MEDICAL HISTORY:  Past Medical History:   Diagnosis Date    Atrial fibrillation (H) 9/23/10    CARDIAC DYSRHYTHMIAS [facility] 4/3/2008    Cardiomyopathy, idiopathic (H)     Coronary artery disease     Stent    Elevated " PSA 10/7/2010    HTN (hypertension) 4/25/2011    Hyperlipidemia     Hypertension     TYLER (obstructive sleep apnea)     CPAP    Shortness of breath        PAST SURGICAL HISTORY:  Past Surgical History:   Procedure Laterality Date    CARDIAC CATHERIZATION  2011    mid RCA stenosis of 85-90%-BMS    COLONOSCOPY N/A 6/5/2019    Procedure: COLONOSCOPY;  Surgeon: Kevin Andujar DO;  Location: [facility]    H ABLATION FOCAL AFIB  3/27/14    ORTHOPEDIC SURGERY      knee- both arthrocsopic    ORTHOPEDIC SURGERY      left pinky finger    REMOVE FOREIGN BODY FINGER Left 2/7/2017    Procedure: REMOVE FOREIGN BODY FINGER;  Surgeon: Adilene Mcdonald MD;  Location: [facility]    SURGICAL HISTORY OF -       right knee arthoscopic    SURGICAL HISTORY OF -       left 5th finger surgery       FAMILY HISTORY:  Family History   Problem Relation Age of Onset    Diabetes Father     Cerebrovascular Disease Father     Unknown/Adopted Maternal Grandfather     Unknown/Adopted Paternal Grandmother     Cancer Paternal Grandfather         unknown    Cardiovascular Mother         had pacemaker placed unknown reason why       SOCIAL HISTORY:  Social History     Socioeconomic History    Marital status:      Spouse name: None    Number of children: None    Years of education: None    Highest education level: None   Tobacco Use    Smoking status: Never    Smokeless tobacco: Never   Substance and Sexual Activity    Alcohol use: Yes     Comment: 2-3 drinks per week    Drug use: No    Sexual activity: Yes     Partners: Female   Other Topics Concern    Parent/sibling w/ CABG, MI or angioplasty before 65F 55M? No    Caffeine Concern Yes     Comment: 2 cups caffeine per day    Sleep Concern Yes     Comment: sleep apnea, wears cpap at night    Stress Concern No    Weight Concern No    Special Diet No    Back Care No    Exercise Yes     Comment: knee exercises, weights             Thank you for allowing me to participate in the care of your  patient.      Sincerely,     SALENA Hutton [facility]  cc:   No referring provider defined for this encounter.

## 2023-08-17 ENCOUNTER — VIRTUAL VISIT (OUTPATIENT)
Dept: UROLOGY | Facility: CLINIC | Age: 68
End: 2023-08-17
Attending: UROLOGY
Payer: COMMERCIAL

## 2023-08-17 DIAGNOSIS — N13.8 BPH WITH URINARY OBSTRUCTION: ICD-10-CM

## 2023-08-17 DIAGNOSIS — N52.9 ERECTILE DYSFUNCTION, UNSPECIFIED ERECTILE DYSFUNCTION TYPE: Primary | ICD-10-CM

## 2023-08-17 DIAGNOSIS — N40.1 BPH WITH URINARY OBSTRUCTION: ICD-10-CM

## 2023-08-17 DIAGNOSIS — R97.20 ELEVATED PSA: ICD-10-CM

## 2023-08-17 PROCEDURE — 99213 OFFICE O/P EST LOW 20 MIN: CPT | Mod: VID | Performed by: UROLOGY

## 2023-08-17 RX ORDER — TAMSULOSIN HYDROCHLORIDE 0.4 MG/1
0.4 CAPSULE ORAL AT BEDTIME
Qty: 90 CAPSULE | Refills: 3 | Status: SHIPPED | OUTPATIENT
Start: 2023-08-17 | End: 2024-03-04

## 2023-08-17 RX ORDER — FINASTERIDE 5 MG/1
5 TABLET, FILM COATED ORAL DAILY
Qty: 90 TABLET | Refills: 3 | Status: SHIPPED | OUTPATIENT
Start: 2023-08-17 | End: 2024-03-04

## 2023-08-17 NOTE — PROGRESS NOTES
"Bhavik is a 67 year old who is being evaluated via a billable video visit.      How would you like to obtain your AVS? MyChart  If the video visit is dropped, the invitation should be resent by: Text to cell phone: [phone]  Will anyone else be joining your video visit? No          Assessment & Plan   Problem List Items Addressed This Visit       Elevated PSA    Relevant Medications    finasteride (PROSCAR) 5 MG tablet     Other Visit Diagnoses       BPH with urinary obstruction        Relevant Medications    finasteride (PROSCAR) 5 MG tablet    tamsulosin (FLOMAX) 0.4 MG capsule             Review of the result(s) of each unique test - psa  Ordering of each unique test  Prescription drug management         BMI:   Estimated body mass index is 32.92 kg/m  as calculated from the following:    Height as of 7/25/23: 1.803 m (5' 11\").    Weight as of 7/25/23: 107 kg (236 lb).           No follow-ups on file.    César Begum MD  [facility]    Subjective   Bhavik is a 67 year old, presenting for the following health issues:  Video Visit    HPI     Doron Beltran is a pleasant 67 year-old gentleman who was seen for f/u with regard to several urological issues.  The patient is a patient of Dr. Gonzales in the past.  He has history of elevated PSA, BPH and erectile dysfunction.  The patient has had 2 previous prostate biopsies for elevated PSA.  The last one was in 2015 by Dr. Gonzales.  He also had an MRI of the prostate that did not show any obvious nodule.  His most recent PSA is 4.67 stable.   He was  also on flomax/proscar.  Prostate size was 78 gms.  He is on viagra for ED.       Review of Systems   Constitutional, HEENT, cardiovascular, pulmonary, gi and gu systems are negative, except as otherwise noted.      Objective           Vitals:  No vitals were obtained today due to virtual visit.    Physical Exam   GENERAL: Healthy, alert and no distress  EYES: Eyes grossly normal to inspection.  No " discharge or erythema, or obvious scleral/conjunctival abnormalities.  RESP: No audible wheeze, cough, or visible cyanosis.  No visible retractions or increased work of breathing.    SKIN: Visible skin clear. No significant rash, abnormal pigmentation or lesions.  NEURO: Cranial nerves grossly intact.  Mentation and speech appropriate for age.  PSYCH: Mentation appears normal, affect normal/bright, judgement and insight intact, normal speech and appearance well-groomed.        Elevated psa:  recheck in one year  BPH: cont with meds  ED: cont with viagra        Video-Visit Details    Type of service:  Video Visit   Video Start Time:   Video End Time:    Originating Location (pt. Location): Home    Distant Location (provider location):  On-site  Platform used for Video Visit: Fahad

## 2023-09-29 DIAGNOSIS — E78.49 OTHER HYPERLIPIDEMIA: ICD-10-CM

## 2023-09-29 RX ORDER — ATORVASTATIN CALCIUM 40 MG/1
40 TABLET, FILM COATED ORAL DAILY
Qty: 90 TABLET | Refills: 3 | Status: SHIPPED | OUTPATIENT
Start: 2023-09-29 | End: 2024-03-04

## 2023-09-29 NOTE — TELEPHONE ENCOUNTER
Last Office Visit: 7/25/23 Vito   Next Office Visit: None scheduled   Last Fill Date: Not listed on fax     Shreya Mcfarland CMA 9/29/2023 9:45 AM

## 2023-10-26 ENCOUNTER — TRANSFERRED RECORDS (OUTPATIENT)
Dept: HEALTH INFORMATION MANAGEMENT | Facility: CLINIC | Age: 68
End: 2023-10-26

## 2024-01-04 DIAGNOSIS — I10 BENIGN ESSENTIAL HTN: ICD-10-CM

## 2024-01-04 DIAGNOSIS — I48.19 PERSISTENT ATRIAL FIBRILLATION (H): ICD-10-CM

## 2024-01-04 RX ORDER — LISINOPRIL 20 MG/1
20 TABLET ORAL 2 TIMES DAILY
Qty: 180 TABLET | Refills: 1 | Status: SHIPPED | OUTPATIENT
Start: 2024-01-04 | End: 2024-01-19

## 2024-01-04 RX ORDER — AMLODIPINE BESYLATE 2.5 MG/1
2.5 TABLET ORAL 2 TIMES DAILY
Qty: 180 TABLET | Refills: 1 | Status: SHIPPED | OUTPATIENT
Start: 2024-01-04 | End: 2024-01-19

## 2024-01-04 RX ORDER — CARVEDILOL 25 MG/1
25 TABLET ORAL 2 TIMES DAILY WITH MEALS
Qty: 180 TABLET | Refills: 1 | Status: SHIPPED | OUTPATIENT
Start: 2024-01-04 | End: 2024-01-19

## 2024-01-04 NOTE — TELEPHONE ENCOUNTER
[facility] Cardiology Refill Guideline reviewed.  Medication meets criteria for refill.    Diana Taylor RN

## 2024-01-04 NOTE — TELEPHONE ENCOUNTER
Last Office Visit: 7/25/23  Next Office Visit: TBD  Last Fill Date: NICK Junior LPN Cardiology   1/4/2024 7:42 AM

## 2024-01-19 DIAGNOSIS — I10 BENIGN ESSENTIAL HTN: ICD-10-CM

## 2024-01-19 DIAGNOSIS — I48.19 PERSISTENT ATRIAL FIBRILLATION (H): ICD-10-CM

## 2024-01-19 RX ORDER — AMLODIPINE BESYLATE 2.5 MG/1
2.5 TABLET ORAL 2 TIMES DAILY
Qty: 180 TABLET | Refills: 1 | Status: SHIPPED | OUTPATIENT
Start: 2024-01-19 | End: 2024-07-01

## 2024-01-19 RX ORDER — LISINOPRIL 20 MG/1
20 TABLET ORAL 2 TIMES DAILY
Qty: 180 TABLET | Refills: 1 | Status: SHIPPED | OUTPATIENT
Start: 2024-01-19 | End: 2024-07-01

## 2024-01-19 RX ORDER — CARVEDILOL 25 MG/1
25 TABLET ORAL 2 TIMES DAILY WITH MEALS
Qty: 180 TABLET | Refills: 1 | Status: SHIPPED | OUTPATIENT
Start: 2024-01-19 | End: 2024-07-01

## 2024-01-19 NOTE — TELEPHONE ENCOUNTER
[facility] Cardiology Refill Guideline reviewed.  Medication meets criteria for refill.    Sindi Stafford, RN

## 2024-01-19 NOTE — TELEPHONE ENCOUNTER
Last Office Visit: 7/25/23 KARL Bruce  Next Office Visit: TBD  Last Fill Date: 1/4/24    **patient requesting 90 day supply**    Elisaebth Can, [facility] 1/19/2024 7:54 AM

## 2024-03-04 ENCOUNTER — TELEPHONE (OUTPATIENT)
Dept: CARDIOLOGY | Facility: CLINIC | Age: 69
End: 2024-03-04
Payer: MEDICARE

## 2024-03-04 DIAGNOSIS — N13.8 BPH WITH URINARY OBSTRUCTION: ICD-10-CM

## 2024-03-04 DIAGNOSIS — E78.49 OTHER HYPERLIPIDEMIA: ICD-10-CM

## 2024-03-04 DIAGNOSIS — N40.1 BPH WITH URINARY OBSTRUCTION: ICD-10-CM

## 2024-03-04 DIAGNOSIS — R97.20 ELEVATED PSA: ICD-10-CM

## 2024-03-04 RX ORDER — ATORVASTATIN CALCIUM 40 MG/1
40 TABLET, FILM COATED ORAL DAILY
Qty: 90 TABLET | Refills: 1 | Status: SHIPPED | OUTPATIENT
Start: 2024-03-04 | End: 2024-07-01

## 2024-03-04 RX ORDER — FINASTERIDE 5 MG/1
5 TABLET, FILM COATED ORAL DAILY
Qty: 90 TABLET | Refills: 2 | Status: SHIPPED | OUTPATIENT
Start: 2024-03-04

## 2024-03-04 RX ORDER — TAMSULOSIN HYDROCHLORIDE 0.4 MG/1
0.4 CAPSULE ORAL AT BEDTIME
Qty: 90 CAPSULE | Refills: 2 | Status: SHIPPED | OUTPATIENT
Start: 2024-03-04

## 2024-03-04 NOTE — TELEPHONE ENCOUNTER
[facility] Call Center    Phone Message    May a detailed message be left on voicemail: yes     Reason for Call: Medication Refill Request    Has the patient contacted the pharmacy for the refill? Yes   Name of medication being requested: atorvastatin (LIPITOR) 40 MG tablet   Provider who prescribed the medication: Lewis Ahn MD   Pharmacy:   [facility] - [address]     Date medication is needed: asap     Action Taken: Other: cardio    Travel Screening: Not Applicable    Thank you!  [facility]

## 2024-03-04 NOTE — TELEPHONE ENCOUNTER
[facility] Call Center    Phone Message    May a detailed message be left on voicemail: yes     Reason for Call: Other: pt calling to change pharmacy, [facility] ;[facility] refill, he has been out and trying since beginning of year finasteride (PROSCAR) 5 MG tablet [[id number]] (Order [id number] and tamsulosin (FLOMAX) 0.4 MG capsule [[id number]] (Order [id number]  pt is out, please advise with pt    Action Taken: Other: urology    Travel Screening: Not Applicable

## 2024-03-04 NOTE — TELEPHONE ENCOUNTER
Refill prescription approved per [facility] Refill Protocol. Last OV= 08/2023.    Pending Prescriptions:                       Disp   Refills    finasteride (PROSCAR) 5 MG tablet         90 tab*2            Sig: Take 1 tablet (5 mg) by mouth daily    tamsulosin (FLOMAX) 0.4 MG capsule        90 cap*2            Sig: Take 1 capsule (0.4 mg) by mouth at bedtime      Korina WATTS RN Urology 3/4/2024 1:13 PM

## 2024-03-04 NOTE — TELEPHONE ENCOUNTER
[facility] Cardiology Refill Guideline reviewed.  Medication meets criteria for refill. Refills sent. Pt notified. Echo Kurtz RN Cardiology March 4, 2024, 11:20 AM

## 2024-06-02 ENCOUNTER — HEALTH MAINTENANCE LETTER (OUTPATIENT)
Age: 69
End: 2024-06-02

## 2024-07-01 DIAGNOSIS — I10 BENIGN ESSENTIAL HTN: ICD-10-CM

## 2024-07-01 DIAGNOSIS — I48.19 PERSISTENT ATRIAL FIBRILLATION (H): ICD-10-CM

## 2024-07-01 DIAGNOSIS — E78.49 OTHER HYPERLIPIDEMIA: ICD-10-CM

## 2024-07-01 RX ORDER — ATORVASTATIN CALCIUM 40 MG/1
40 TABLET, FILM COATED ORAL DAILY
Qty: 90 TABLET | Refills: 0 | Status: SHIPPED | OUTPATIENT
Start: 2024-07-01 | End: 2024-09-17

## 2024-07-01 RX ORDER — CARVEDILOL 25 MG/1
25 TABLET ORAL 2 TIMES DAILY WITH MEALS
Qty: 180 TABLET | Refills: 0 | Status: SHIPPED | OUTPATIENT
Start: 2024-07-01 | End: 2024-09-30

## 2024-07-01 RX ORDER — AMLODIPINE BESYLATE 2.5 MG/1
2.5 TABLET ORAL 2 TIMES DAILY
Qty: 180 TABLET | Refills: 0 | Status: SHIPPED | OUTPATIENT
Start: 2024-07-01 | End: 2024-09-30

## 2024-07-01 RX ORDER — LISINOPRIL 20 MG/1
20 TABLET ORAL 2 TIMES DAILY
Qty: 180 TABLET | Refills: 0 | Status: SHIPPED | OUTPATIENT
Start: 2024-07-01 | End: 2024-09-30

## 2024-07-01 NOTE — TELEPHONE ENCOUNTER
[facility] Cardiology Refill Guideline reviewed.  Medication meets criteria for refill.    Diana Taylor RN

## 2024-07-01 NOTE — TELEPHONE ENCOUNTER
Last Office Visit: 07/25/24 KARL Bruce  Next Office Visit: 09/30/24 KARL Magana MA Cardiology   7/1/2024 10:12 AM

## 2024-08-15 ENCOUNTER — LAB (OUTPATIENT)
Dept: LAB | Facility: CLINIC | Age: 69
End: 2024-08-15
Payer: MEDICARE

## 2024-08-15 DIAGNOSIS — N40.1 BPH WITH URINARY OBSTRUCTION: ICD-10-CM

## 2024-08-15 DIAGNOSIS — N13.8 BPH WITH URINARY OBSTRUCTION: ICD-10-CM

## 2024-08-15 DIAGNOSIS — R97.20 ELEVATED PSA: ICD-10-CM

## 2024-08-15 LAB — PSA SERPL DL<=0.01 NG/ML-MCNC: 4.04 NG/ML (ref 0–4.5)

## 2024-08-15 PROCEDURE — 84153 ASSAY OF PSA TOTAL: CPT

## 2024-08-15 PROCEDURE — 36415 COLL VENOUS BLD VENIPUNCTURE: CPT

## 2024-09-17 DIAGNOSIS — E78.49 OTHER HYPERLIPIDEMIA: ICD-10-CM

## 2024-09-17 RX ORDER — ATORVASTATIN CALCIUM 40 MG/1
40 TABLET, FILM COATED ORAL DAILY
Qty: 90 TABLET | Refills: 0 | Status: SHIPPED | OUTPATIENT
Start: 2024-09-17 | End: 2024-09-30

## 2024-09-17 NOTE — TELEPHONE ENCOUNTER
Last Office Visit: 7/25/23 (Teo)  Next Office Visit: 9/30/24  Last Fill Date: 7/1/24  Amalia Junior LPN Cardiology   9/17/2024 10:34 AM

## 2024-09-17 NOTE — TELEPHONE ENCOUNTER
Over due for yearly visit. Scheduled for next visit 9/30/24. Rx refilled to get pt to this visit.     No further refills until seen by cardiology--call [phone] to schedule    [facility] Cardiology Refill Guideline reviewed.  Medication meets criteria for refill.    Diana Taylor RN

## 2024-09-26 ENCOUNTER — HOSPITAL ENCOUNTER (OUTPATIENT)
Dept: CARDIOLOGY | Facility: CLINIC | Age: 69
Discharge: HOME | End: 2024-09-26
Attending: PHYSICIAN ASSISTANT | Admitting: PHYSICIAN ASSISTANT
Payer: MEDICARE

## 2024-09-26 DIAGNOSIS — E78.49 OTHER HYPERLIPIDEMIA: ICD-10-CM

## 2024-09-26 DIAGNOSIS — I10 BENIGN ESSENTIAL HTN: Primary | ICD-10-CM

## 2024-09-26 DIAGNOSIS — I48.19 PERSISTENT ATRIAL FIBRILLATION (H): ICD-10-CM

## 2024-09-26 LAB — LVEF ECHO: NORMAL

## 2024-09-26 PROCEDURE — 93306 TTE W/DOPPLER COMPLETE: CPT

## 2024-09-26 PROCEDURE — 93306 TTE W/DOPPLER COMPLETE: CPT | Mod: 26 | Performed by: INTERNAL MEDICINE

## 2024-09-26 NOTE — PROGRESS NOTES
Bhavik Carias has an upcoming lab appointment with us. Please review and place orders if needed.  Thank you,  [facility]

## 2024-09-27 ENCOUNTER — ANCILLARY PROCEDURE (OUTPATIENT)
Dept: GENERAL RADIOLOGY | Facility: CLINIC | Age: 69
End: 2024-09-27
Payer: MEDICARE

## 2024-09-27 ENCOUNTER — OFFICE VISIT (OUTPATIENT)
Dept: FAMILY MEDICINE | Facility: CLINIC | Age: 69
End: 2024-09-27
Payer: MEDICARE

## 2024-09-27 VITALS
WEIGHT: 234.8 LBS | SYSTOLIC BLOOD PRESSURE: 132 MMHG | HEIGHT: 71 IN | OXYGEN SATURATION: 96 % | DIASTOLIC BLOOD PRESSURE: 68 MMHG | TEMPERATURE: 97.1 F | RESPIRATION RATE: 16 BRPM | HEART RATE: 57 BPM | BODY MASS INDEX: 32.87 KG/M2
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
## 2024-09-27 DIAGNOSIS — M25.50 POLYARTHRALGIA: ICD-10-CM

## 2024-09-27 DIAGNOSIS — E78.49 OTHER HYPERLIPIDEMIA: ICD-10-CM

## 2024-09-27 DIAGNOSIS — I48.91 ATRIAL FIBRILLATION, UNSPECIFIED TYPE (H): ICD-10-CM

## 2024-09-27 DIAGNOSIS — I10 BENIGN ESSENTIAL HTN: ICD-10-CM

## 2024-09-27 DIAGNOSIS — M25.50 POLYARTHRALGIA: Primary | ICD-10-CM

## 2024-09-27 DIAGNOSIS — Z71.1 CONCERN ABOUT SKIN CANCER WITHOUT DIAGNOSIS: ICD-10-CM

## 2024-09-27 DIAGNOSIS — S30.860A INSECT BITE OF LOWER BACK, INITIAL ENCOUNTER: ICD-10-CM

## 2024-09-27 LAB
ALT SERPL W P-5'-P-CCNC: 21 U/L (ref 0–70)
ANION GAP SERPL CALCULATED.3IONS-SCNC: 6 MMOL/L (ref 7–15)
B BURGDOR IGG+IGM SER QL: 0.89
BUN SERPL-MCNC: 17 MG/DL (ref 8–23)
CALCIUM SERPL-MCNC: 10 MG/DL (ref 8.8–10.4)
CHLORIDE SERPL-SCNC: 100 MMOL/L (ref 98–107)
CHOLEST SERPL-MCNC: 172 MG/DL
CREAT SERPL-MCNC: 0.97 MG/DL (ref 0.67–1.17)
EGFRCR SERPLBLD CKD-EPI 2021: 85 ML/MIN/1.73M2
ERYTHROCYTE [DISTWIDTH] IN BLOOD BY AUTOMATED COUNT: 12 % (ref 10–15)
FASTING STATUS PATIENT QL REPORTED: YES
FASTING STATUS PATIENT QL REPORTED: YES
GLUCOSE SERPL-MCNC: 129 MG/DL (ref 70–99)
HCO3 SERPL-SCNC: 31 MMOL/L (ref 22–29)
HCT VFR BLD AUTO: 46.8 % (ref 40–53)
HDLC SERPL-MCNC: 37 MG/DL
HGB BLD-MCNC: 15.2 G/DL (ref 13.3–17.7)
LDLC SERPL CALC-MCNC: 108 MG/DL
MCH RBC QN AUTO: 31.1 PG (ref 26.5–33)
MCHC RBC AUTO-ENTMCNC: 32.5 G/DL (ref 31.5–36.5)
MCV RBC AUTO: 96 FL (ref 78–100)
NONHDLC SERPL-MCNC: 135 MG/DL
PLATELET # BLD AUTO: 213 10E3/UL (ref 150–450)
POTASSIUM SERPL-SCNC: 5 MMOL/L (ref 3.4–5.3)
RBC # BLD AUTO: 4.89 10E6/UL (ref 4.4–5.9)
SODIUM SERPL-SCNC: 137 MMOL/L (ref 135–145)
TRIGL SERPL-MCNC: 133 MG/DL
WBC # BLD AUTO: 4.6 10E3/UL (ref 4–11)

## 2024-09-27 PROCEDURE — 85027 COMPLETE CBC AUTOMATED: CPT

## 2024-09-27 PROCEDURE — 72040 X-RAY EXAM NECK SPINE 2-3 VW: CPT | Mod: TC | Performed by: RADIOLOGY

## 2024-09-27 PROCEDURE — 86618 LYME DISEASE ANTIBODY: CPT

## 2024-09-27 PROCEDURE — 99203 OFFICE O/P NEW LOW 30 MIN: CPT

## 2024-09-27 PROCEDURE — 36415 COLL VENOUS BLD VENIPUNCTURE: CPT

## 2024-09-27 PROCEDURE — 80061 LIPID PANEL: CPT

## 2024-09-27 PROCEDURE — 72100 X-RAY EXAM L-S SPINE 2/3 VWS: CPT | Mod: TC | Performed by: RADIOLOGY

## 2024-09-27 PROCEDURE — 80048 BASIC METABOLIC PNL TOTAL CA: CPT

## 2024-09-27 PROCEDURE — 84460 ALANINE AMINO (ALT) (SGPT): CPT

## 2024-09-27 ASSESSMENT — PAIN SCALES - GENERAL: PAINLEVEL: MILD PAIN (2)

## 2024-09-27 NOTE — PROGRESS NOTES
"[facility] HEART CLINIC    I had the pleasure of seeing Bhavik when he came for follow up of AFib.  This 67 year old sees Dr. Ahn for his history of:    1. Sx'c persistent AFib with failure of sotalol and Multaq, s/p ablation 3/2014. No recurrence  2. Benign Essential HTN  3. CAD s/p RCA stent 2011   4. TYLER on CPAP therapy  5. CHADSVASc 3 (HTN, age, CAD); he's opted to not be on AC given no recurrent AF since ablation 2014      Last Visit & Interval History:  I last saw Bhavik 7/2023 at which time he was doing well without recurrent AF.  He remained quite active without complaints.  I recommended he see us back 1 year with updated echo.    Echo showed low normal EF 50-55% with some mild inferoseptal HK.  Overall this looked similar to the echo done 2017.    Today's Visit:  Bhavik is feeling really good!  He remains active, but notes he is much more active in the winter/fall with hunting and snowmobiling, so was not surprised to see his blood sugar up a bit and LDL stable.  Denies any problems with chest pain, pressure, tightness.  No change in exertional tolerance.    Denies recurrent arrhythmias as far as he is aware.  He has not had any problems on his carvedilol 25 mg BID.     Denies edema, orthopnea, PND.  No lightheadedness or dizziness.      Reviewed  updated echo and compared to the one done in 2017.  EF had been 55%, now labeled 50-55%.  Continues with some hypokinesis and no significant valvular abnormalities.    VITALS:  Vitals: /68   Pulse 60   Resp 16   Ht 1.803 m (5' 11\")   Wt 106.7 kg (235 lb 3.2 oz)   SpO2 96%   BMI 32.80 kg/m      Diagnostic Testing:  Echo 9/2024 showed LVEF 50-55% with some mild distal/apical inferoseptal wall HK.  Mild LVH.  Normal RV.  Normal atria.  Trace MR, trace TR with RVSP.  Normal aorta, no pericardial effusion, SB. Noted that in study 2017, EF was 55% with mid-distal lateral HK  ZioPatch 4/1-8/2021 while on Carvedilol 25 mg BID showed SR with 1 run of PSVT.  In " SR, avg HR 68 bpm (range 52-99 bpm).  14 beat run of SVT noted unit(s) pto 122 bpm, avg 112 bpm. Asx'c. <1% PACs/PVCs   Echocardiogam 9/2017 showed EF 55%. Grade 3 diastolic dysfunction. RWMA with mild distal lateral/high-lateral hypokinesis. RV wnl. LA borderline dilated. 1+ MR.   Nuclear Stress Test 11/2017 showed no significant ischemia, while reaching only a 71% MPHR  Component      Latest Ref Rng 7/19/2023  8:04 AM 9/27/2024  10:41 AM   Sodium      135 - 145 mmol/L 138  137    Potassium      3.4 - 5.3 mmol/L 4.0  5.0    Chloride      98 - 107 mmol/L 102  100    Carbon Dioxide (CO2)      22 - 29 mmol/L 26  31 (H)    Anion Gap      7 - 15 mmol/L 10  6 (L)    Urea Nitrogen      8.0 - 23.0 mg/dL 22.7  17.0    Creatinine      0.67 - 1.17 mg/dL 0.94  0.97    GFR Estimate      >60 mL/min/1.73m2 89  85    Calcium      8.8 - 10.4 mg/dL 9.4  10.0    Glucose      70 - 99 mg/dL 125 (H)  129 (H)      Component      Latest Ref Rng 7/19/2023  8:04 AM 9/27/2024  10:41 AM   Sodium      135 - 145 mmol/L 138  137    Potassium      3.4 - 5.3 mmol/L 4.0  5.0    Chloride      98 - 107 mmol/L 102  100    Carbon Dioxide (CO2)      22 - 29 mmol/L 26  31 (H)    Anion Gap      7 - 15 mmol/L 10  6 (L)    Urea Nitrogen      8.0 - 23.0 mg/dL 22.7  17.0    Creatinine      0.67 - 1.17 mg/dL 0.94  0.97    GFR Estimate      >60 mL/min/1.73m2 89  85    Calcium      8.8 - 10.4 mg/dL 9.4  10.0    Glucose      70 - 99 mg/dL 125 (H)  129 (H)       Component      Latest Ref Rng 4/25/2022  9:14 AM 7/19/2023  8:04 AM 9/27/2024  10:41 AM   Cholesterol      <200 mg/dL 179  168  172    Triglycerides      <150 mg/dL 206 (H)  118  133    HDL Cholesterol      >=40 mg/dL 38 (L)  36 (L)  37 (L)    LDL Cholesterol Calculated      <100 mg/dL 100  108 (H)  108 (H)    Non HDL Cholesterol      <130 mg/dL 141 (H)  132 (H)  135 (H)         Plan:  Annual follow-up with fasting lipids, BMP  Repeat fasting labs in 4/2025 when he returns from [place] (where he is very  active) and contact him with results. LDL goal <100 mg/dL given CAD so may require adjustment in Lipitor dose  See me back 1 year      Assessment/Plan:    Persistent AFib  S/p AFib ablation 2014  ZioPatch 4/2021 without recurrent atrial fibrillation.  No recent symptoms to suggest recurrent atrial fibrillation  Remains on carvedilol  SB noted on echo    Has not had recurrent AF since ablation 2014.  Therefore, has declined AC.  Reviewed [id number] 3 (HTN, age, CAD)     PLAN:  Continue to monitor  Annual follow-up, but to contact us with any concerns for arrhythmia      CAD  S/p RCA stent 2011.  Echo 2017 showed lateral hypokinesis.  This echo showed low-normal EF 50-55% with inferior HK. No c/o CP  Remains on BB, ACE, statin and ASA 81 mg daily  Labs as above with LDL still above goal    PLAN:  Continue to monitor for chest discomfort    Dyslipidemia  As above, remains on atorvastatin 40 mg daily and tolerating well  Reviewed that back in 2021 when he was very active, HDL and LDL were at goal  Note that fasting blood sugar significantly increased    PLAN:  Encouraged increase in atorvastatin to 80 mg daily but he would like to see where diet/exercise changes can get him over the winter.  Reviewed decreasing processed foods/saturated fat  Reviewed increasing exercise, easy to do for him in the winter as is out in [place]  Repeat fasting labs 4/2025 when he returns and will call to address results    4. HTN  Lisinopril 20 mg daily, amlodipine 2.5 mg BID carvedilol 25 mg BID  BP today looks great   BMP as above    PLAN:    Continue current medications. Refilled     Geneva Bruce PA-C, MSPAS      Orders Placed This Encounter   Procedures    Lipid Profile    ALT    Basic metabolic panel    Lipid panel reflex to direct LDL Fasting    Follow-Up with Cardiology BUSHRA     Orders Placed This Encounter   Medications    amLODIPine (NORVASC) 2.5 MG tablet     Sig: Take 1 tablet (2.5 mg) by mouth 2 times daily.     Dispense:  180 tablet      Refill:  3    atorvastatin (LIPITOR) 40 MG tablet     Sig: Take 1 tablet (40 mg) by mouth daily.     Dispense:  90 tablet     Refill:  3    carvedilol (COREG) 25 MG tablet     Sig: Take 1 tablet (25 mg) by mouth 2 times daily (with meals).     Dispense:  180 tablet     Refill:  3    lisinopril (ZESTRIL) 20 MG tablet     Sig: Take 1 tablet (20 mg) by mouth 2 times daily.     Dispense:  180 tablet     Refill:  3     Medications Discontinued During This Encounter   Medication Reason    amLODIPine (NORVASC) 2.5 MG tablet Reorder (No AVS)    carvedilol (COREG) 25 MG tablet Reorder (No AVS)    lisinopril (ZESTRIL) 20 MG tablet Reorder (No AVS)    atorvastatin (LIPITOR) 40 MG tablet Reorder (No AVS)           Encounter Diagnoses   Name Primary?    Persistent atrial fibrillation (H)     Other hyperlipidemia     Benign essential HTN          CURRENT MEDICATIONS:  Current Outpatient Medications   Medication Sig Dispense Refill    amLODIPine (NORVASC) 2.5 MG tablet Take 1 tablet (2.5 mg) by mouth 2 times daily. 180 tablet 3    atorvastatin (LIPITOR) 40 MG tablet Take 1 tablet (40 mg) by mouth daily. 90 tablet 3    carvedilol (COREG) 25 MG tablet Take 1 tablet (25 mg) by mouth 2 times daily (with meals). 180 tablet 3    lisinopril (ZESTRIL) 20 MG tablet Take 1 tablet (20 mg) by mouth 2 times daily. 180 tablet 3    aspirin 81 MG tablet Take 81 mg by mouth daily      finasteride (PROSCAR) 5 MG tablet Take 1 tablet (5 mg) by mouth daily 90 tablet 2    tamsulosin (FLOMAX) 0.4 MG capsule Take 1 capsule (0.4 mg) by mouth at bedtime 90 capsule 2       ALLERGIES     Allergies   Allergen Reactions    Nka [No Known Allergies]          Review of Systems:  Skin:        Eyes:       ENT:       Respiratory:  Negative for shortness of breath;dyspnea on exertion  Cardiovascular:  Negative for;palpitations;chest pain;edema;syncope or near-syncope;fatigue;lightheadedness;dizziness    Gastroenterology:      Genitourinary:      "  Musculoskeletal:       Neurologic:       Psychiatric:       Heme/Lymph/Imm:       Endocrine:         Physical Exam:  Vitals: /68   Pulse 60   Resp 16   Ht 1.803 m (5' 11\")   Wt 106.7 kg (235 lb 3.2 oz)   SpO2 96%   BMI 32.80 kg/m      Constitutional:           Skin:           Head:           Eyes:           ENT:           Neck:           Chest:           Cardiac:                    Abdomen:           Vascular:                                        Extremities and Back:           Neurological:               PAST MEDICAL HISTORY:  Past Medical History:   Diagnosis Date    Atrial fibrillation (H) 9/23/10    CARDIAC DYSRHYTHMIAS NEC 4/3/2008    Cardiomyopathy, idiopathic (H)     Coronary artery disease     Stent    Elevated PSA 10/7/2010    HTN (hypertension) 4/25/2011    Hyperlipidemia     Hypertension     TYLER (obstructive sleep apnea)     CPAP    Shortness of breath        PAST SURGICAL HISTORY:  Past Surgical History:   Procedure Laterality Date    CARDIAC CATHERIZATION  2011    mid RCA stenosis of 85-90%-BMS    COLONOSCOPY N/A 6/5/2019    Procedure: COLONOSCOPY;  Surgeon: Kevin Andujar DO;  Location: [facility]    H ABLATION FOCAL AFIB  3/27/14    ORTHOPEDIC SURGERY      knee- both arthrocsopic    ORTHOPEDIC SURGERY      left pinky finger    REMOVE FOREIGN BODY FINGER Left 2/7/2017    Procedure: REMOVE FOREIGN BODY FINGER;  Surgeon: Adilene Mcdonald MD;  Location: [facility]    SURGICAL HISTORY OF -       right knee arthoscopic    SURGICAL HISTORY OF -       left 5th finger surgery       FAMILY HISTORY:  Family History   Problem Relation Age of Onset    Diabetes Father     Cerebrovascular Disease Father     Unknown/Adopted Maternal Grandfather     Unknown/Adopted Paternal Grandmother     Cancer Paternal Grandfather         unknown    Cardiovascular Mother         had pacemaker placed unknown reason why       SOCIAL HISTORY:  Social History     Socioeconomic History    Marital status:      " Spouse name: None    Number of children: None    Years of education: None    Highest education level: None   Tobacco Use    Smoking status: Never    Smokeless tobacco: Never   Vaping Use    Vaping status: Never Used   Substance and Sexual Activity    Alcohol use: Yes     Comment: 2-3 drinks per week    Drug use: No    Sexual activity: Yes     Partners: Female   Other Topics Concern    Parent/sibling w/ CABG, MI or angioplasty before 65F 55M? No    Caffeine Concern Yes     Comment: 2 cups caffeine per day    Sleep Concern Yes     Comment: sleep apnea, wears cpap at night    Stress Concern No    Weight Concern No    Special Diet No    Back Care No    Exercise Yes     Comment: knee exercises, weights     Social Determinants of Health     Financial Resource Strain: Low Risk  (9/26/2024)    Financial Resource Strain     Within the past 12 months, have you or your family members you live with been unable to get utilities (heat, electricity) when it was really needed?: No   Food Insecurity: Low Risk  (9/26/2024)    Food Insecurity     Within the past 12 months, did you worry that your food would run out before you got money to buy more?: No     Within the past 12 months, did the food you bought just not last and you didn t have money to get more?: No   Transportation Needs: Low Risk  (9/26/2024)    Transportation Needs     Within the past 12 months, has lack of transportation kept you from medical appointments, getting your medicines, non-medical meetings or appointments, work, or from getting things that you need?: No   Interpersonal Safety: Low Risk  (9/27/2024)    Interpersonal Safety     Do you feel physically and emotionally safe where you currently live?: Yes     Within the past 12 months, have you been hit, slapped, kicked or otherwise physically hurt by someone?: No     Within the past 12 months, have you been humiliated or emotionally abused in other ways by your partner or ex-partner?: No   Housing Stability: Low  Risk  (9/26/2024)    Housing Stability     Do you have housing? : Yes     Are you worried about losing your housing?: No

## 2024-09-27 NOTE — PROGRESS NOTES
"  Assessment & Plan     Polyarthralgia  Pain consistently present in neck and back.  Some arthritic changes noted in the neck on imaging that was completed in clinic today.  Adequate joint space noted in lumbar spine.  See official radiology read below.  Patient has concern about possible lyme. Will test today.  Discussed options of work-up, patient agreeable to both lyme blood work and x-ray.   - XR Cervical Spine 2/3 Views; Future  - XR Lumbar Spine 2/3 Views; Future  - LYME DISEASE TOTAL ANTIBODIES WITH REFLEX TO CONFIRMATION; Future    Insect bite of lower back, initial encounter  Not acutely infected at this time. Area of firmness at site of bite.   - LYME DISEASE TOTAL ANTIBODIES WITH REFLEX TO CONFIRMATION; Future    Atrial fibrillation, unspecified type (H)  Rate and rhythm regular today, denies palpitations or notable rhythm changes.     Concern about skin cancer without diagnosis  Areas of concern present on face, chest and shoulder.   - Adult Dermatology  Referral; Future      *Discussed recommendation to return to establish can and complete wellness preventative visit as there are gaps of care since he is primarily seen by specialty providers.     BMI  Estimated body mass index is 32.75 kg/m  as calculated from the following:    Height as of this encounter: 1.803 m (5' 11\").    Weight as of this encounter: 106.5 kg (234 lb 12.8 oz).         Subjective   Bhavik is a 68 year old, presenting for the following health issues:  Establish Care, Musculoskeletal Problem (Low back, and neck pain. Left hip and thigh pain. For most of the summer. Patient wondering about Lyme's disease), and Derm Problem (Possible infected bug bite; Got bug bite second week of July. Firm lump where bug bite was, left lower back.  //  Check spot of left cheek)        9/27/2024     9:08 AM   Additional Questions   Roomed by Amalia     History of Present Illness       Reason for visit:  Bites, muscles  Symptom onset:  More than a " month  Symptoms include:  Redness, swelling, muscles are sore in my neck, lower dack and sometimes thigh  Symptom intensity:  Moderate  Symptom progression:  Worsening  Had these symptoms before:  No   He is taking medications regularly.     Some time in May noted a bite, at that time noted neck pain that travels up head with low back pain that traveled down his leg, noted pain on the lateral aspect of left leg and anterior thigh. No recollection of a bull's eye rash. No tick visualized or removed.   Additional bites in July which has left a residual lump on left low back. Immediately after bite, noted erythema, tenderness, exudate.     Today experiencing pain 2/10, notes it is present in neck and low back. Woke him early, 2 am, did some stretching and took a hot tub. Does not normally wake him from sleep. Feels pain is slowing him down, but not significantly impacting ADLs. Has sought care from chiropractor and massage, which helped a lot. Experiencing fatigue. No recent cognitive concerns.  No numbness/tingling. Denies shooting pains. Straight leg test negative.    Chief Complaint   Patient presents with    Establish Care    Musculoskeletal Problem     Low back, and neck pain. Left hip and thigh pain. For most of the summer. Patient wondering about Lyme's disease    Derm Problem     Possible infected bug bite; Got bug bite second week of July. Firm lump where bug bite was, left lower back.  //  Check spot of left cheek         Pain History:  When did you first notice your pain? 4+ months ago   Have you seen this provider for your pain in the past? No   Where in your body do your have pain? Back and neck  Are you seeing anyone else for your pain? Yes - chiropractor and massage therapist.   What makes your pain better? Massage, chiropractic, ibuprofen as needed, activity   What makes your pain worse? nothing  How has pain affected your ability to work? Not applicable  Who lives in your household? wife    Review of  "Systems  CONSTITUTIONAL: NEGATIVE for fever, chills, change in weight  INTEGUMENTARY/SKIN: POSITIVE for pigmentation change on cheek, chest, shoulder, areas growing larger  CV: NEGATIVE for chest pain, palpitations or peripheral edema  MUSCULOSKELETAL:POSITIVE  for back pain: left>right lumber, and neck pain the occipital nerve  NEURO: NEGATIVE for weakness, dizziness or paresthesias      Objective    /68 (BP Location: Right arm, Patient Position: Sitting, Cuff Size: Adult Large)   Pulse 57   Temp 97.1  F (36.2  C) (Tympanic)   Resp 16   Ht 1.803 m (5' 11\")   Wt 106.5 kg (234 lb 12.8 oz)   SpO2 96%   BMI 32.75 kg/m    Body mass index is 32.75 kg/m .    Physical Exam   GENERAL: alert and no distress  NECK: no adenopathy, no asymmetry, masses, or scars  RESP: lungs clear to auscultation - no rales, rhonchi or wheezes  CV: regular rate and rhythm, normal S1 S2, no S3 or S4, no murmur, click or rub, no peripheral edema  MS: no gross musculoskeletal defects noted, no edema  ORTHO: Cervical Spine Exam: Inspection: normal cervical lordosis  Tender:  normal musculature  Range of Motion:  Full ROM of cervical spine  Strength: Full strength of all neck muscles  NEURO: Normal strength and tone, mentation intact, cranial nerves 2-12 intact, DTR's normal and symmetric, and gait normal        XR Lumbar Spine 2/3 Views    Result Date: 9/27/2024  XR LUMBAR SPINE 2/3 VIEWS  9/27/2024 10:40 AM HISTORY: Polyarthralgia COMPARISON: None.     IMPRESSION: Grade 1 anterolisthesis at L4-5. No loss of vertebral body height. No significant degenerative endplate changes or loss of intervertebral disc space. Calcified atherosclerotic plaques in abdominal aorta and bilateral iliac arteries. RUTHIE WEBSTER MD   SYSTEM ID:  [id number]    XR Cervical Spine 2/3 Views    Result Date: 9/27/2024  XR CERVICAL SPINE 2/3 VIEWS 9/27/2024 10:40 AM HISTORY: Polyarthralgia COMPARISON: None.     IMPRESSION: Mild retrolisthesis at C5-6 and C6-7. " No loss of vertebral body height. Disc height loss and osteophyte formation at C5-6 and C6-7. RUTHIE WEBSTER MD   SYSTEM ID:  [id number]      Signed Electronically by: PEPE Molina, CNP

## 2024-09-30 ENCOUNTER — OFFICE VISIT (OUTPATIENT)
Dept: CARDIOLOGY | Facility: CLINIC | Age: 69
End: 2024-09-30
Payer: MEDICARE

## 2024-09-30 VITALS
BODY MASS INDEX: 32.93 KG/M2 | HEART RATE: 60 BPM | DIASTOLIC BLOOD PRESSURE: 68 MMHG | OXYGEN SATURATION: 96 % | SYSTOLIC BLOOD PRESSURE: 119 MMHG | RESPIRATION RATE: 16 BRPM | WEIGHT: 235.2 LBS | HEIGHT: 71 IN

## 2024-09-30 DIAGNOSIS — I48.19 PERSISTENT ATRIAL FIBRILLATION (H): ICD-10-CM

## 2024-09-30 DIAGNOSIS — I10 BENIGN ESSENTIAL HTN: ICD-10-CM

## 2024-09-30 DIAGNOSIS — E78.49 OTHER HYPERLIPIDEMIA: ICD-10-CM

## 2024-09-30 PROCEDURE — 99214 OFFICE O/P EST MOD 30 MIN: CPT | Performed by: PHYSICIAN ASSISTANT

## 2024-09-30 RX ORDER — LISINOPRIL 20 MG/1
20 TABLET ORAL 2 TIMES DAILY
Qty: 180 TABLET | Refills: 3 | Status: SHIPPED | OUTPATIENT
Start: 2024-09-30

## 2024-09-30 RX ORDER — ATORVASTATIN CALCIUM 40 MG/1
40 TABLET, FILM COATED ORAL DAILY
Qty: 90 TABLET | Refills: 3 | Status: SHIPPED | OUTPATIENT
Start: 2024-09-30

## 2024-09-30 RX ORDER — CARVEDILOL 25 MG/1
25 TABLET ORAL 2 TIMES DAILY WITH MEALS
Qty: 180 TABLET | Refills: 3 | Status: SHIPPED | OUTPATIENT
Start: 2024-09-30

## 2024-09-30 RX ORDER — AMLODIPINE BESYLATE 2.5 MG/1
2.5 TABLET ORAL 2 TIMES DAILY
Qty: 180 TABLET | Refills: 3 | Status: SHIPPED | OUTPATIENT
Start: 2024-09-30

## 2024-09-30 NOTE — PATIENT INSTRUCTIONS
"Bhavik - it was nice to see you today!     At your visit today we reviewed:    Echo showed pretty stable heart function (last checked 2017!). No significant valve abnls and overall pumping function was stable ~50-55%  Reviewed blood work. Fasting blood sugar a bit high and cholesterol with LDL (bad chol) still above goal     Medication Changes:    Agreed NO med changes for now     Recommendations:    For LDL/bad cholesterol   Limit saturated fat in diet (from animal products)  Red meat  High fat dairy products, like cheese, ice cream, butter, etc ... Processed foods (creamy salad dressings, peanut butter ... Check  serving size!!)   Restaurant foods   Limit \"junk\" - crackers, chips, cookies, etc    Exercise - aim for at least 150 minutes of AEROBIC exercise per week    2. Get fasting labs in 2025 to recheck cholesterol after your active winter/changing diet - we'll call with results and see if we need to adjust cholesterol medication. Goal is as close to <70 mg/dL as we can get it given old stent    Follow-up:    See me for cardiology follow up in 1 year but CALL Cardiology nurses Diana & Echo @ [phone] for any issues, questions or concerns in the interim.      To schedule a future appointment, we kindly ask that you call cardiology scheduling at [phone] three months prior to requested visit date.    Important Phone Numbers for [facility] ([place]):    [place] [facility] Diana Dodge: [phone]  Cardiology [phone]  [place] [facility] [phone]  [place] [facility] [phone]  [place] [facility]: [phone]      "

## 2024-09-30 NOTE — LETTER
"9/30/2024    Physician No Ref-Primary  No address on file    RE: Doron Feldman       Dear Colleague,     I had the pleasure of seeing Doron Feldman in the [facility].  [facility]    I had the pleasure of seeing Bhavik when he came for follow up of AFib.  This 67 year old sees Dr. Ahn for his history of:    1. Sx'c persistent AFib with failure of sotalol and Multaq, s/p ablation 3/2014. No recurrence  2. Benign Essential HTN  3. CAD s/p RCA stent 2011   4. TYLER on CPAP therapy  5. CHADSVASc 3 (HTN, age, CAD); he's opted to not be on AC given no recurrent AF since ablation 2014      Last Visit & Interval History:  I last saw Bhavik 7/2023 at which time he was doing well without recurrent AF.  He remained quite active without complaints.  I recommended he see us back 1 year with updated echo.    Echo showed low normal EF 50-55% with some mild inferoseptal HK.  Overall this looked similar to the echo done 2017.    Today's Visit:  Bhavik is feeling really good!  He remains active, but notes he is much more active in the winter/fall with hunting and snowmobiling, so was not surprised to see his blood sugar up a bit and LDL stable.  Denies any problems with chest pain, pressure, tightness.  No change in exertional tolerance.    Denies recurrent arrhythmias as far as he is aware.  He has not had any problems on his carvedilol 25 mg BID.     Denies edema, orthopnea, PND.  No lightheadedness or dizziness.      Reviewed  updated echo and compared to the one done in 2017.  EF had been 55%, now labeled 50-55%.  Continues with some hypokinesis and no significant valvular abnormalities.    VITALS:  Vitals: /68   Pulse 60   Resp 16   Ht 1.803 m (5' 11\")   Wt 106.7 kg (235 lb 3.2 oz)   SpO2 96%   BMI 32.80 kg/m      Diagnostic Testing:  Echo 9/2024 showed LVEF 50-55% with some mild distal/apical inferoseptal wall HK.  Mild LVH.  Normal RV.  Normal atria.  Trace MR, trace TR with RVSP.  " Normal aorta, no pericardial effusion, SB. Noted that in study 2017, EF was 55% with mid-distal lateral HK  ZioPatch 4/1-8/2021 while on Carvedilol 25 mg BID showed SR with 1 run of PSVT.  In SR, avg HR 68 bpm (range 52-99 bpm).  14 beat run of SVT noted unit(s) pto 122 bpm, avg 112 bpm. Asx'c. <1% PACs/PVCs   Echocardiogam 9/2017 showed EF 55%. Grade 3 diastolic dysfunction. RWMA with mild distal lateral/high-lateral hypokinesis. RV wnl. LA borderline dilated. 1+ MR.   Nuclear Stress Test 11/2017 showed no significant ischemia, while reaching only a 71% MPHR  Component      Latest Ref Rng 7/19/2023  8:04 AM 9/27/2024  10:41 AM   Sodium      135 - 145 mmol/L 138  137    Potassium      3.4 - 5.3 mmol/L 4.0  5.0    Chloride      98 - 107 mmol/L 102  100    Carbon Dioxide (CO2)      22 - 29 mmol/L 26  31 (H)    Anion Gap      7 - 15 mmol/L 10  6 (L)    Urea Nitrogen      8.0 - 23.0 mg/dL 22.7  17.0    Creatinine      0.67 - 1.17 mg/dL 0.94  0.97    GFR Estimate      >60 mL/min/1.73m2 89  85    Calcium      8.8 - 10.4 mg/dL 9.4  10.0    Glucose      70 - 99 mg/dL 125 (H)  129 (H)      Component      Latest Ref Rng 7/19/2023  8:04 AM 9/27/2024  10:41 AM   Sodium      135 - 145 mmol/L 138  137    Potassium      3.4 - 5.3 mmol/L 4.0  5.0    Chloride      98 - 107 mmol/L 102  100    Carbon Dioxide (CO2)      22 - 29 mmol/L 26  31 (H)    Anion Gap      7 - 15 mmol/L 10  6 (L)    Urea Nitrogen      8.0 - 23.0 mg/dL 22.7  17.0    Creatinine      0.67 - 1.17 mg/dL 0.94  0.97    GFR Estimate      >60 mL/min/1.73m2 89  85    Calcium      8.8 - 10.4 mg/dL 9.4  10.0    Glucose      70 - 99 mg/dL 125 (H)  129 (H)       Component      Latest Ref Rng 4/25/2022  9:14 AM 7/19/2023  8:04 AM 9/27/2024  10:41 AM   Cholesterol      <200 mg/dL 179  168  172    Triglycerides      <150 mg/dL 206 (H)  118  133    HDL Cholesterol      >=40 mg/dL 38 (L)  36 (L)  37 (L)    LDL Cholesterol Calculated      <100 mg/dL 100  108 (H)  108 (H)    Non HDL  Cholesterol      <130 mg/dL 141 (H)  132 (H)  135 (H)         Plan:  Annual follow-up with fasting lipids, BMP  Repeat fasting labs in 4/2025 when he returns from [place] (where he is very active) and contact him with results. LDL goal <100 mg/dL given CAD so may require adjustment in Lipitor dose  See me back 1 year      Assessment/Plan:    Persistent AFib  S/p AFib ablation 2014  ZioPatch 4/2021 without recurrent atrial fibrillation.  No recent symptoms to suggest recurrent atrial fibrillation  Remains on carvedilol  SB noted on echo    Has not had recurrent AF since ablation 2014.  Therefore, has declined AC.  Reviewed DNH2AY3-PGYs 3 (HTN, age, CAD)     PLAN:  Continue to monitor  Annual follow-up, but to contact us with any concerns for arrhythmia      CAD  S/p RCA stent 2011.  Echo 2017 showed lateral hypokinesis.  This echo showed low-normal EF 50-55% with inferior HK. No c/o CP  Remains on BB, ACE, statin and ASA 81 mg daily  Labs as above with LDL still above goal    PLAN:  Continue to monitor for chest discomfort    Dyslipidemia  As above, remains on atorvastatin 40 mg daily and tolerating well  Reviewed that back in 2021 when he was very active, HDL and LDL were at goal  Note that fasting blood sugar significantly increased    PLAN:  Encouraged increase in atorvastatin to 80 mg daily but he would like to see where diet/exercise changes can get him over the winter.  Reviewed decreasing processed foods/saturated fat  Reviewed increasing exercise, easy to do for him in the winter as [place] out in [place]  Repeat fasting labs 4/2025 when he returns and will call to address results    4. HTN  Lisinopril 20 mg daily, amlodipine 2.5 mg BID carvedilol 25 mg BID  BP today looks great   BMP as above    PLAN:    Continue current medications. Refilled     Geneva Bruce PA-C, MSPAS      Orders Placed This Encounter   Procedures     Lipid Profile     ALT     Basic metabolic panel     Lipid panel reflex to direct LDL Fasting      Follow-Up with Cardiology BUSHRA     Orders Placed This Encounter   Medications     amLODIPine (NORVASC) 2.5 MG tablet     Sig: Take 1 tablet (2.5 mg) by mouth 2 times daily.     Dispense:  180 tablet     Refill:  3     atorvastatin (LIPITOR) 40 MG tablet     Sig: Take 1 tablet (40 mg) by mouth daily.     Dispense:  90 tablet     Refill:  3     carvedilol (COREG) 25 MG tablet     Sig: Take 1 tablet (25 mg) by mouth 2 times daily (with meals).     Dispense:  180 tablet     Refill:  3     lisinopril (ZESTRIL) 20 MG tablet     Sig: Take 1 tablet (20 mg) by mouth 2 times daily.     Dispense:  180 tablet     Refill:  3     Medications Discontinued During This Encounter   Medication Reason     amLODIPine (NORVASC) 2.5 MG tablet Reorder (No AVS)     carvedilol (COREG) 25 MG tablet Reorder (No AVS)     lisinopril (ZESTRIL) 20 MG tablet Reorder (No AVS)     atorvastatin (LIPITOR) 40 MG tablet Reorder (No AVS)           Encounter Diagnoses   Name Primary?     Persistent atrial fibrillation (H)      Other hyperlipidemia      Benign essential HTN          CURRENT MEDICATIONS:  Current Outpatient Medications   Medication Sig Dispense Refill     amLODIPine (NORVASC) 2.5 MG tablet Take 1 tablet (2.5 mg) by mouth 2 times daily. 180 tablet 3     atorvastatin (LIPITOR) 40 MG tablet Take 1 tablet (40 mg) by mouth daily. 90 tablet 3     carvedilol (COREG) 25 MG tablet Take 1 tablet (25 mg) by mouth 2 times daily (with meals). 180 tablet 3     lisinopril (ZESTRIL) 20 MG tablet Take 1 tablet (20 mg) by mouth 2 times daily. 180 tablet 3     aspirin 81 MG tablet Take 81 mg by mouth daily       finasteride (PROSCAR) 5 MG tablet Take 1 tablet (5 mg) by mouth daily 90 tablet 2     tamsulosin (FLOMAX) 0.4 MG capsule Take 1 capsule (0.4 mg) by mouth at bedtime 90 capsule 2       ALLERGIES     Allergies   Allergen Reactions     Nka [No Known Allergies]          Review of Systems:  Skin:        Eyes:       ENT:       Respiratory:  Negative for  "shortness of breath;dyspnea on exertion  Cardiovascular:  Negative for;palpitations;chest pain;edema;syncope or near-syncope;fatigue;lightheadedness;dizziness    Gastroenterology:      Genitourinary:       Musculoskeletal:       Neurologic:       Psychiatric:       Heme/Lymph/Imm:       Endocrine:         Physical Exam:  Vitals: /68   Pulse 60   Resp 16   Ht 1.803 m (5' 11\")   Wt 106.7 kg (235 lb 3.2 oz)   SpO2 96%   BMI 32.80 kg/m      Constitutional:           Skin:           Head:           Eyes:           ENT:           Neck:           Chest:           Cardiac:                    Abdomen:           Vascular:                                        Extremities and Back:           Neurological:               PAST MEDICAL HISTORY:  Past Medical History:   Diagnosis Date     Atrial fibrillation (H) 9/23/10     CARDIAC DYSRHYTHMIAS NEC 4/3/2008     Cardiomyopathy, idiopathic (H)      Coronary artery disease     Stent     Elevated PSA 10/7/2010     HTN (hypertension) 4/25/2011     Hyperlipidemia      Hypertension      TYLER (obstructive sleep apnea)     CPAP     Shortness of breath        PAST SURGICAL HISTORY:  Past Surgical History:   Procedure Laterality Date     CARDIAC CATHERIZATION  2011    mid RCA stenosis of 85-90%-BMS     COLONOSCOPY N/A 6/5/2019    Procedure: COLONOSCOPY;  Surgeon: Kevin Andujar DO;  Location: [facility]     H ABLATION FOCAL AFIB  3/27/14     ORTHOPEDIC SURGERY      knee- both arthrocsopic     ORTHOPEDIC SURGERY      left pinky finger     REMOVE FOREIGN BODY FINGER Left 2/7/2017    Procedure: REMOVE FOREIGN BODY FINGER;  Surgeon: Adilene Mcdonald MD;  Location: [facility]     SURGICAL HISTORY OF -       right knee arthoscopic     SURGICAL HISTORY OF -       left 5th finger surgery       FAMILY HISTORY:  Family History   Problem Relation Age of Onset     Diabetes Father      Cerebrovascular Disease Father      Unknown/Adopted Maternal Grandfather      Unknown/Adopted Paternal " Grandmother      Cancer Paternal Grandfather         unknown     Cardiovascular Mother         had pacemaker placed unknown reason why       SOCIAL HISTORY:  Social History     Socioeconomic History     Marital status:      Spouse name: None     Number of children: None     Years of education: None     Highest education level: None   Tobacco Use     Smoking status: Never     Smokeless tobacco: Never   Vaping Use     Vaping status: Never Used   Substance and Sexual Activity     Alcohol use: Yes     Comment: 2-3 drinks per week     Drug use: No     Sexual activity: Yes     Partners: Female   Other Topics Concern     Parent/sibling w/ CABG, MI or angioplasty before 65F 55M? No     Caffeine Concern Yes     Comment: 2 cups caffeine per day     Sleep Concern Yes     Comment: sleep apnea, wears cpap at night     Stress Concern No     Weight Concern No     Special Diet No     Back Care No     Exercise Yes     Comment: knee exercises, weights     Social Determinants of Health     Financial Resource Strain: Low Risk  (9/26/2024)    Financial Resource Strain      Within the past 12 months, have you or your family members you live with been unable to get utilities (heat, electricity) when it was really needed?: No   Food Insecurity: Low Risk  (9/26/2024)    Food Insecurity      Within the past 12 months, did you worry that your food would run out before you got money to buy more?: No      Within the past 12 months, did the food you bought just not last and you didn t have money to get more?: No   Transportation Needs: Low Risk  (9/26/2024)    Transportation Needs      Within the past 12 months, has lack of transportation kept you from medical appointments, getting your medicines, non-medical meetings or appointments, work, or from getting things that you need?: No   Interpersonal Safety: Low Risk  (9/27/2024)    Interpersonal Safety      Do you feel physically and emotionally safe where you currently live?: Yes       Within the past 12 months, have you been hit, slapped, kicked or otherwise physically hurt by someone?: No      Within the past 12 months, have you been humiliated or emotionally abused in other ways by your partner or ex-partner?: No   Housing Stability: Low Risk  (9/26/2024)    Housing Stability      Do you have housing? : Yes      Are you worried about losing your housing?: No           Thank you for allowing me to participate in the care of your patient.      Sincerely,     Moriah Bruce PA-C     [facility]  cc:   Moriah Bruce PA-C  [address]  [place] [address]

## 2024-11-11 DIAGNOSIS — R97.20 ELEVATED PSA: ICD-10-CM

## 2024-11-11 NOTE — TELEPHONE ENCOUNTER
Requested Prescriptions   Pending Prescriptions Disp Refills    finasteride (PROSCAR) 5 MG tablet 90 tablet 2     Sig: Take 1 tablet (5 mg) by mouth daily.       There is no refill protocol information for this order

## 2024-11-13 RX ORDER — FINASTERIDE 5 MG/1
5 TABLET, FILM COATED ORAL DAILY
Qty: 90 TABLET | Refills: 0 | Status: SHIPPED | OUTPATIENT
Start: 2024-11-13

## 2024-11-13 NOTE — TELEPHONE ENCOUNTER
ONE TIME SHEEBA refill.   Last OV= 08/2023.   Does patient have an upcoming appointment? No.   Follow up:Send SecureMedia message to patient   Pending Prescriptions:                       Disp   Refills    finasteride (PROSCAR) 5 MG tablet         90 tab*0            Sig: Take 1 tablet (5 mg) by mouth daily.       Korina WATTS RN Urology 11/13/2024 2:51 PM

## 2024-12-19 ENCOUNTER — VIRTUAL VISIT (OUTPATIENT)
Dept: UROLOGY | Facility: CLINIC | Age: 69
End: 2024-12-19
Payer: MEDICARE

## 2024-12-19 DIAGNOSIS — N13.8 BPH WITH URINARY OBSTRUCTION: ICD-10-CM

## 2024-12-19 DIAGNOSIS — R97.20 ELEVATED PSA: Primary | ICD-10-CM

## 2024-12-19 DIAGNOSIS — N52.9 ERECTILE DYSFUNCTION, UNSPECIFIED ERECTILE DYSFUNCTION TYPE: ICD-10-CM

## 2024-12-19 DIAGNOSIS — N40.1 BPH WITH URINARY OBSTRUCTION: ICD-10-CM

## 2024-12-19 RX ORDER — FINASTERIDE 5 MG/1
5 TABLET, FILM COATED ORAL DAILY
Qty: 90 TABLET | Refills: 3 | Status: SHIPPED | OUTPATIENT
Start: 2024-12-19

## 2024-12-19 RX ORDER — TAMSULOSIN HYDROCHLORIDE 0.4 MG/1
0.4 CAPSULE ORAL AT BEDTIME
Qty: 90 CAPSULE | Refills: 3 | Status: SHIPPED | OUTPATIENT
Start: 2024-12-19

## 2024-12-19 NOTE — PROGRESS NOTES
"Bhavik is a 68 year old who is being evaluated via a billable video visit.      How would you like to obtain your AVS? MyChart  If the video visit is dropped, the invitation should be resent by: Text to cell phone: [phone]  Will anyone else be joining your video visit? No          Assessment & Plan   Problem List Items Addressed This Visit       Elevated PSA - Primary    Relevant Medications    finasteride (PROSCAR) 5 MG tablet     Other Visit Diagnoses       BPH with urinary obstruction        Relevant Medications    tamsulosin (FLOMAX) 0.4 MG capsule    finasteride (PROSCAR) 5 MG tablet    Erectile dysfunction, unspecified erectile dysfunction type                 Review of the result(s) of each unique test - psa  Ordering of each unique test  Prescription drug management         BMI:   Estimated body mass index is 32.8 kg/m  as calculated from the following:    Height as of 9/30/24: 1.803 m (5' 11\").    Weight as of 9/30/24: 106.7 kg (235 lb 3.2 oz).           No follow-ups on file.    César Begum MD  [facility]    Merlene Gutierres is a 67 year old, presenting for the following health issues:  Follow Up    HPI     Doron Beltran is a pleasant 68 year-old gentleman who was seen for f/u with regard to several urological issues.  The patient is a patient of Dr. Gonzales in the past.  He has history of elevated PSA, BPH and erectile dysfunction.  The patient has had 2 previous prostate biopsies for elevated PSA.  The last one was in 2015 by Dr. Gonzales.  He also had an MRI of the prostate that did not show any obvious nodule.  His most recent PSA is 4's stable.   He was  also on flomax/proscar.  Prostate size was 78 gms.  He is on viagra prn for ED.       Review of Systems   Constitutional, HEENT, cardiovascular, pulmonary, gi and gu systems are negative, except as otherwise noted.      Objective           Vitals:  No vitals were obtained today due to virtual visit.    Physical Exam "   GENERAL: Healthy, alert and no distress  EYES: Eyes grossly normal to inspection.  No discharge or erythema, or obvious scleral/conjunctival abnormalities.  RESP: No audible wheeze, cough, or visible cyanosis.  No visible retractions or increased work of breathing.    SKIN: Visible skin clear. No significant rash, abnormal pigmentation or lesions.  NEURO: Cranial nerves grossly intact.  Mentation and speech appropriate for age.  PSYCH: Mentation appears normal, affect normal/bright, judgement and insight intact, normal speech and appearance well-groomed.        Elevated psa:  recheck in one year  BPH: cont with meds  ED: cont with viagra prn        Video-Visit Details    Type of service:  Video Visit   Video Start Time:   Video End Time:    Originating Location (pt. Location): Home    Distant Location (provider location):  On-site  Platform used for Video Visit: Fahad

## 2025-04-10 ENCOUNTER — LAB (OUTPATIENT)
Dept: LAB | Facility: CLINIC | Age: 70
End: 2025-04-10
Payer: MEDICARE

## 2025-04-10 DIAGNOSIS — I10 BENIGN ESSENTIAL HTN: ICD-10-CM

## 2025-04-10 DIAGNOSIS — E78.49 OTHER HYPERLIPIDEMIA: ICD-10-CM

## 2025-04-10 DIAGNOSIS — E78.5 HYPERLIPIDEMIA: Primary | ICD-10-CM

## 2025-04-10 DIAGNOSIS — I48.19 PERSISTENT ATRIAL FIBRILLATION (H): ICD-10-CM

## 2025-04-10 LAB
ALT SERPL W P-5'-P-CCNC: 19 U/L (ref 0–70)
CHOLEST SERPL-MCNC: 158 MG/DL
FASTING STATUS PATIENT QL REPORTED: YES
HDLC SERPL-MCNC: 34 MG/DL
LDLC SERPL CALC-MCNC: 101 MG/DL
NONHDLC SERPL-MCNC: 124 MG/DL
TRIGL SERPL-MCNC: 116 MG/DL

## 2025-04-10 RX ORDER — ATORVASTATIN CALCIUM 80 MG/1
80 TABLET, FILM COATED ORAL DAILY
Qty: 90 TABLET | Refills: 1 | Status: SHIPPED | OUTPATIENT
Start: 2025-04-10

## 2025-04-14 ENCOUNTER — TELEPHONE (OUTPATIENT)
Dept: FAMILY MEDICINE | Facility: CLINIC | Age: 70
End: 2025-04-14
Payer: MEDICARE

## 2025-04-14 NOTE — TELEPHONE ENCOUNTER
Patient Quality Outreach    Patient is due for the following:   Physical Annual Wellness Visit    Action(s) Taken:   Schedule a Annual Wellness Visit    Type of outreach:    Sent Domee message.    Questions for provider review:    None         Amalia Ramos  Chart routed to None.

## 2025-04-15 ENCOUNTER — ANCILLARY PROCEDURE (OUTPATIENT)
Dept: GENERAL RADIOLOGY | Facility: CLINIC | Age: 70
End: 2025-04-15
Attending: FAMILY MEDICINE
Payer: MEDICARE

## 2025-04-15 ENCOUNTER — OFFICE VISIT (OUTPATIENT)
Dept: FAMILY MEDICINE | Facility: CLINIC | Age: 70
End: 2025-04-15
Payer: MEDICARE

## 2025-04-15 VITALS
SYSTOLIC BLOOD PRESSURE: 114 MMHG | BODY MASS INDEX: 33.21 KG/M2 | DIASTOLIC BLOOD PRESSURE: 58 MMHG | WEIGHT: 232 LBS | HEART RATE: 64 BPM | OXYGEN SATURATION: 95 % | HEIGHT: 70 IN | RESPIRATION RATE: 20 BRPM | TEMPERATURE: 97.7 F

## 2025-04-15 DIAGNOSIS — K42.9 UMBILICAL HERNIA WITHOUT OBSTRUCTION AND WITHOUT GANGRENE: ICD-10-CM

## 2025-04-15 DIAGNOSIS — I48.91 ATRIAL FIBRILLATION, UNSPECIFIED TYPE (H): ICD-10-CM

## 2025-04-15 DIAGNOSIS — M19.049 OSTEOARTHRITIS OF METACARPOPHALANGEAL (MCP) JOINT: ICD-10-CM

## 2025-04-15 DIAGNOSIS — M79.671 CHRONIC HEEL PAIN, RIGHT: ICD-10-CM

## 2025-04-15 DIAGNOSIS — M25.541 METACARPOPHALANGEAL JOINT PAIN OF RIGHT HAND: Primary | ICD-10-CM

## 2025-04-15 DIAGNOSIS — I42.9 CARDIOMYOPATHY, UNSPECIFIED TYPE (H): ICD-10-CM

## 2025-04-15 DIAGNOSIS — G89.29 CHRONIC HEEL PAIN, RIGHT: ICD-10-CM

## 2025-04-15 PROCEDURE — 3078F DIAST BP <80 MM HG: CPT | Performed by: FAMILY MEDICINE

## 2025-04-15 PROCEDURE — 99213 OFFICE O/P EST LOW 20 MIN: CPT | Performed by: FAMILY MEDICINE

## 2025-04-15 PROCEDURE — 1125F AMNT PAIN NOTED PAIN PRSNT: CPT | Performed by: FAMILY MEDICINE

## 2025-04-15 PROCEDURE — G2211 COMPLEX E/M VISIT ADD ON: HCPCS | Performed by: FAMILY MEDICINE

## 2025-04-15 PROCEDURE — 73130 X-RAY EXAM OF HAND: CPT | Mod: TC | Performed by: INTERNAL MEDICINE

## 2025-04-15 PROCEDURE — 3074F SYST BP LT 130 MM HG: CPT | Performed by: FAMILY MEDICINE

## 2025-04-15 PROCEDURE — 73650 X-RAY EXAM OF HEEL: CPT | Mod: TC | Performed by: STUDENT IN AN ORGANIZED HEALTH CARE EDUCATION/TRAINING PROGRAM

## 2025-04-15 ASSESSMENT — PAIN SCALES - GENERAL: PAINLEVEL_OUTOF10: MILD PAIN (3)
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
## 2025-04-15 NOTE — PATIENT INSTRUCTIONS
Xray results in 24 hours.  Further recommendations thereafter.    Heel pain likely plantar fasciitis.  This is inflammation fo the fat pad of the feet due to repetitive high impact.  Rest from high impact activities for at least 2 weeks, then gradually return to exercise thereafter.  Ice compress to foot 2-3 times a day and after exercise.  Sole/heel stretches. See literature attached here.  You may do cycling or other non-impact activities/exercise for now.  If no improvement after 6-8 weeks, call clinic.     Suspect osteoarthritis of the 4th knuckle.  Activity as tolerated.  Rest if painful joint.  Possible consult with hand ortho after xray is resulted.    Referrals to general surgery clinic has been signed. Schedulers will call you in the next 3-5 business days.

## 2025-04-16 ENCOUNTER — PATIENT OUTREACH (OUTPATIENT)
Dept: CARE COORDINATION | Facility: CLINIC | Age: 70
End: 2025-04-16
Payer: MEDICARE

## 2025-04-17 ENCOUNTER — OFFICE VISIT (OUTPATIENT)
Dept: ORTHOPEDICS | Facility: CLINIC | Age: 70
End: 2025-04-17
Attending: FAMILY MEDICINE
Payer: MEDICARE

## 2025-04-17 VITALS — WEIGHT: 232 LBS | HEIGHT: 70 IN | BODY MASS INDEX: 33.21 KG/M2

## 2025-04-17 DIAGNOSIS — M25.541 METACARPOPHALANGEAL JOINT PAIN OF RIGHT HAND: ICD-10-CM

## 2025-04-17 DIAGNOSIS — M19.049 OSTEOARTHRITIS OF METACARPOPHALANGEAL (MCP) JOINT: ICD-10-CM

## 2025-04-17 PROCEDURE — 20604 DRAIN/INJ JOINT/BURSA W/US: CPT | Mod: F7 | Performed by: FAMILY MEDICINE

## 2025-04-17 PROCEDURE — 99203 OFFICE O/P NEW LOW 30 MIN: CPT | Mod: 25 | Performed by: FAMILY MEDICINE

## 2025-04-17 RX ADMIN — ROPIVACAINE HYDROCHLORIDE 1 ML: 5 INJECTION, SOLUTION EPIDURAL; INFILTRATION; PERINEURAL at 16:30

## 2025-04-17 RX ADMIN — TRIAMCINOLONE ACETONIDE 40 MG: 40 INJECTION, SUSPENSION INTRA-ARTICULAR; INTRAMUSCULAR at 16:30

## 2025-04-17 NOTE — LETTER
2025      Doron Feldman  [address] [facility] [place] [id number]      Dear Colleague,    Thank you for referring your patient, Doron Feldman, to the [facility] [place]. Please see a copy of my visit note below.    Doron Feldman  :  1955  DOS: 2025  MRN: [id number]    Sports Medicine Clinic Visit    PCP: No Ref-Primary, Physician    Doron Feldman is a 69 year old Right hand dominant male who is seen in consultation at the request of  Ranjith Romano M.D. presenting with right ring finger pain.    Injury: Gradual onset of pain over the past 1 - 2 years.  Pain located over right hand, 3rd - 4th MCP joints, nonradiating.  Additional Features:  Positive: swelling and decreased  strength.  Symptoms are better with activity modification.  Symptoms are worse with: gripping/grasping, direct pressure.  Other evaluation and/or treatments so far consists of: No Treatment tried to date.  Recent imaging completed: X-rays completed 4/15/2025.  Prior History of related problems: none    Social History: retired    Review of Systems  Musculoskeletal: as above  Remainder of review of systems is negative including constitutional, CV, pulmonary, GI, Skin and Neurologic except as noted in HPI or medical history.    Past Medical History:   Diagnosis Date     Atrial fibrillation (H) 9/23/10     CARDIAC DYSRHYTHMIAS NEC 4/3/2008     Cardiomyopathy, idiopathic (H)      Coronary artery disease     Stent     Elevated PSA 10/7/2010     HTN (hypertension) 2011     Hyperlipidemia      Hypertension      TYLER (obstructive sleep apnea)     CPAP     Shortness of breath      Past Surgical History:   Procedure Laterality Date     CARDIAC CATHERIZATION      mid RCA stenosis of 85-90%-BMS     COLONOSCOPY N/A 2019    Procedure: COLONOSCOPY;  Surgeon: Kevin Andujar DO;  Location: [facility] [facility]     H ABLATION FOCAL AFIB  3/27/14     ORTHOPEDIC SURGERY      knee- both  "arthrocsopic     ORTHOPEDIC SURGERY      left pinky finger     REMOVE FOREIGN BODY FINGER Left 2/7/2017    Procedure: REMOVE FOREIGN BODY FINGER;  Surgeon: Adilene Mcdonald MD;  Location: [place] [facility]     SURGICAL HISTORY OF -       right knee arthoscopic     SURGICAL HISTORY OF -       left 5th finger surgery     Family History   Problem Relation Age of Onset     Diabetes Father      Cerebrovascular Disease Father      Unknown/Adopted Maternal Grandfather      Unknown/Adopted Paternal Grandmother      Cancer Paternal Grandfather         unknown     Cardiovascular Mother         had pacemaker placed unknown reason why         Objective  Ht 1.783 m (5' 10.2\")   Wt 105.2 kg (232 lb)   BMI 33.10 kg/m      General: healthy, alert and in no distress    HEENT: no scleral icterus or conjunctival erythema   Skin: no suspicious lesions or rash. No jaundice.   CV: regular rhythm by palpation, 2+ distal pulses, no pedal edema    Resp: normal respiratory effort without conversational dyspnea   Psych: normal mood and affect    Gait: nonantalgic, appropriate coordination and balance   Neuro: normal light touch sensory exam of the extremities. Motor strength as noted below     Right Wrist and Hand exam    Inspection:       Swelling: 3rd > 4th MCP joint    Tender:       MCP joint of 3rd and 4th digit(s)  right    Non Tender:       Remainder of the Wrist and Hand right    ROM:       Decreased active and passive ROM of the 3rd and 4th MCP and PIP with flexion and extension right    Strength:       5/5 strength in the muscles of the hand, wrist and forearm bilateral    Special Tests:        neg (-) Tinel's test bilateral and       neg (-) Phalen's test bilateral    Neurovascular:       2+ radial pulses bilaterally with brisk capillary refill and      normal sensation to light touch in the radial, median and ulnar nerve distributions      Radiology:  XR Hand Right G/E 3 Views    Narrative    EXAM: XR HAND RIGHT G/E 3 VIEWS  LOCATION: [facility]  DATE: 4/15/2025    INDICATION: r o djd 4th MCP  COMPARISON: None.      Impression    IMPRESSION: No acute displaced fracture. Ulnar positive variance. Small chronic osseous density adjacent to the ulnar styloid. Metacarpophalangeal joint arthrosis greatest and mild of the first and third metacarpophalangeal joints. Minimal degenerative   change of the fourth metacarpophalangeal joint. Vascular calcifications.     Hand / Upper Extremity Injection/Arthrocentesis: R long MCP    Date/Time: 4/17/2025 4:30 PM    Performed by: Wade Chahal DO  Authorized by: Wade Chahal DO    Indications:  Therapeutic and pain  Needle Size:  25 G  Guidance: ultrasound    Approach:  Dorsal  Condition: osteoarthritis    Location:  Long finger  Site:  R long MCP    Medications:  40 mg triamcinolone 40 MG/ML; 1 mL ROPivacaine 5 MG/ML  Outcome:  Tolerated well, no immediate complications  Procedure discussed: discussed risks, benefits, and alternatives    Consent Given by:  Patient  Timeout: timeout called immediately prior to procedure    Prep: patient was prepped and draped in usual sterile fashion     Ultrasound images of procedure were permanently stored.       Assessment:  1. Metacarpophalangeal joint pain of right hand    2. Osteoarthritis of metacarpophalangeal (MCP) joint        Plan:  Discussed the assessment with the patient.  Follow up: 3 weeks if not improved  Increased pain in the right hand recently affecting his sense of strength in the hand  Motor function and strength intact on isolated testing  Painful 3rd >> 4th MCP joint, DJD present, scattered elsewhere throughout the hand  Oral Tylenol and topical Voltaren gel reviewed as safe OTC options, reviewed safe dosing strategies  Activity modification reviewed  Bracing and glove options reviewed  After review of the alternatives, relative risks, goals and limitations of corticosteroid injection, the patient elected to  proceed with US guided CSI to the right 3rd MCP joint today  Suspect some of the 4th digit limitations are related to proximity and collaboration with 3rd digit function which tests far wose  Consider alternative injection locations or further referral in the future if not improved or pain quickly recurs  Expectations and goals of CSI reviewed  Often 2-3 days for steroid effect, and can take up to two weeks for maximum effect  We discussed modified progressive pain-free activity as tolerated  Do not overuse in first two weeks if feeling better due to concern for vulnerability while steroid is working  Supportive care reviewed  All questions were answered today  Contact us with additional questions or concerns  Signs and sx of concern reviewed    Thanks very much for sending this nice gentleman to us, I will keep you updated with his progress      Wade Chahal DO, [facility] [place]  Sports Medicine Physician  [facility]          Disclaimer: This note consists of symbols derived from keyboarding, dictation and/or voice recognition software. As a result, there may be errors in the script that have gone undetected. Please consider this when interpreting information found in this chart.      Again, thank you for allowing me to participate in the care of your patient.        Sincerely,        Wade Chahal DO    Electronically signed

## 2025-04-17 NOTE — PROGRESS NOTES
Doron Feldman  :  1955  DOS: 2025  MRN: [id number]    Sports Medicine Clinic Visit    PCP: No Ref-Primary, Physician    Doron Feldman is a 69 year old Right hand dominant male who is seen in consultation at the request of  Ranjith Romano M.D. presenting with right ring finger pain.    Injury: Gradual onset of pain over the past 1 - 2 years.  Pain located over right hand, 3rd - 4th MCP joints, nonradiating.  Additional Features:  Positive: swelling and decreased  strength.  Symptoms are better with activity modification.  Symptoms are worse with: gripping/grasping, direct pressure.  Other evaluation and/or treatments so far consists of: No Treatment tried to date.  Recent imaging completed: X-rays completed 4/15/2025.  Prior History of related problems: none    Social History: retired    Review of Systems  Musculoskeletal: as above  Remainder of review of systems is negative including constitutional, CV, pulmonary, GI, Skin and Neurologic except as noted in HPI or medical history.    Past Medical History:   Diagnosis Date    Atrial fibrillation (H) 9/23/10    CARDIAC DYSRHYTHMIAS NEC 4/3/2008    Cardiomyopathy, idiopathic (H)     Coronary artery disease     Stent    Elevated PSA 10/7/2010    HTN (hypertension) 2011    Hyperlipidemia     Hypertension     TYLER (obstructive sleep apnea)     CPAP    Shortness of breath      Past Surgical History:   Procedure Laterality Date    CARDIAC CATHERIZATION      mid RCA stenosis of 85-90%-BMS    COLONOSCOPY N/A 2019    Procedure: COLONOSCOPY;  Surgeon: Kevin Andujar DO;  Location: [facility] [facility]    H ABLATION FOCAL AFIB  3/27/14    ORTHOPEDIC SURGERY      knee- both arthrocsopic    ORTHOPEDIC SURGERY      left pinky finger    REMOVE FOREIGN BODY FINGER Left 2017    Procedure: REMOVE FOREIGN BODY FINGER;  Surgeon: Adilene Mcdonald MD;  Location: [facility] [facility]    SURGICAL HISTORY OF -       right knee arthoscopic    SURGICAL HISTORY  "OF -       left 5th finger surgery     Family History   Problem Relation Age of Onset    Diabetes Father     Cerebrovascular Disease Father     Unknown/Adopted Maternal Grandfather     Unknown/Adopted Paternal Grandmother     Cancer Paternal Grandfather         unknown    Cardiovascular Mother         had pacemaker placed unknown reason why         Objective  Ht 1.783 m (5' 10.2\")   Wt 105.2 kg (232 lb)   BMI 33.10 kg/m      General: healthy, alert and in no distress    HEENT: no scleral icterus or conjunctival erythema   Skin: no suspicious lesions or rash. No jaundice.   CV: regular rhythm by palpation, 2+ distal pulses, no pedal edema    Resp: normal respiratory effort without conversational dyspnea   Psych: normal mood and affect    Gait: ***antalgic, appropriate coordination and balance   Neuro: normal light touch sensory exam of the extremities. Motor strength as noted below     {RIGHT/LEFT:688636::\"Bilateral\"} Wrist and Hand exam    Inspection:  {SKINSPECTIONWRIST:765818}    Tender:  {SKTENDERWRIST:156808}    Non Tender:  {SKNONTENDERWRIST:302118}    ROM:  {SKWRISTROM:721316}    Strength:  {SKWRISTSTRE:327216}    Special Tests:  {SKWRISTSPECIAL:740599}    Neurovascular:  {SKNV:080926}    {RIGHT/LEFT:166590::\"Bilateral\"} Elbow exam:    Inspection:  {Elbow inspection:366793::\"     no ecchymosis\",\"     no edema or effusion\"}    ROM:  {Elbow ROM:087069}    Strength:  {Elbow strength:275217}    Tender:  {Elbow tender:402217}    Non-Tender:  {nontender:759129::\"     remainder of elbow area\"}    Sensation:  {elbow sensation:262239}     Skin:  {skin tests:213556::\"     well perfused\",\"     capillary refill less than 2 seconds\"}        Radiology:                        XR Hand Right G/E 3 Views    Narrative    EXAM: XR HAND RIGHT G/E 3 VIEWS  LOCATION: [facility]  DATE: 4/15/2025    INDICATION: r o djd 4th MCP  COMPARISON: None.      Impression    IMPRESSION: No acute displaced fracture. " "Ulnar positive variance. Small chronic osseous density adjacent to the ulnar styloid. Metacarpophalangeal joint arthrosis greatest and mild of the first and third metacarpophalangeal joints. Minimal degenerative   change of the fourth metacarpophalangeal joint. Vascular calcifications.     Hand / Upper Extremity Injection/Arthrocentesis: R long MCP    Date/Time: 4/17/2025 4:30 PM    Performed by: Wade Chahal DO  Authorized by: Wade Chahal DO    Indications:  Therapeutic and pain  Needle Size:  25 G  Guidance: ultrasound    Approach:  Dorsal  Condition: osteoarthritis    Location:  Long finger  Site:  R long MCP    Medications:  40 mg triamcinolone 40 MG/ML; 1 mL ROPivacaine 5 MG/ML  Outcome:  Tolerated well, no immediate complications  Procedure discussed: discussed risks, benefits, and alternatives    Consent Given by:  Patient  Timeout: timeout called immediately prior to procedure    Prep: patient was prepped and draped in usual sterile fashion     Ultrasound images of procedure were permanently stored.           Assessment:  1. Metacarpophalangeal joint pain of right hand    2. Osteoarthritis of metacarpophalangeal (MCP) joint        Plan:  Discussed the assessment with the patient.  {[facility] Plan:994497::\"Follow up: ***\"}        Disclaimer: This note consists of symbols derived from keyboarding, dictation and/or voice recognition software. As a result, there may be errors in the script that have gone undetected. Please consider this when interpreting information found in this chart.  " wose  Consider alternative injection locations or further referral in the future if not improved or pain quickly recurs  Expectations and goals of CSI reviewed  Often 2-3 days for steroid effect, and can take up to two weeks for maximum effect  We discussed modified progressive pain-free activity as tolerated  Do not overuse in first two weeks if feeling better due to concern for vulnerability while steroid is working  Supportive care reviewed  All questions were answered today  Contact us with additional questions or concerns  Signs and sx of concern reviewed    Thanks very much for sending this nice gentleman to us, I will keep you updated with his progress      Wade Chahal DO, [place]  Sports Medicine Physician  [facility]          Disclaimer: This note consists of symbols derived from keyboarding, dictation and/or voice recognition software. As a result, there may be errors in the script that have gone undetected. Please consider this when interpreting information found in this chart.

## 2025-04-22 NOTE — TELEPHONE ENCOUNTER
Patient Quality Outreach    Patient is due for the following:   Physical Annual Wellness Visit    Action(s) Taken:   Patient declined follow up at this time.    Type of outreach:    Phone, spoke to patient/parent. Patient declines to schedule at this time.    Questions for provider review:    None         Amalia Ramos  Chart routed to None.

## 2025-04-28 RX ORDER — TRIAMCINOLONE ACETONIDE 40 MG/ML
40 INJECTION, SUSPENSION INTRA-ARTICULAR; INTRAMUSCULAR
Status: COMPLETED | OUTPATIENT
Start: 2025-04-17 | End: 2025-04-17

## 2025-04-28 RX ORDER — ROPIVACAINE HYDROCHLORIDE 5 MG/ML
1 INJECTION, SOLUTION EPIDURAL; INFILTRATION; PERINEURAL
Status: COMPLETED | OUTPATIENT
Start: 2025-04-17 | End: 2025-04-17

## 2025-06-15 ENCOUNTER — HEALTH MAINTENANCE LETTER (OUTPATIENT)
Age: 70
End: 2025-06-15

## 2025-08-12 ENCOUNTER — TELEPHONE (OUTPATIENT)
Dept: FAMILY MEDICINE | Facility: CLINIC | Age: 70
End: 2025-08-12
Payer: MEDICARE

## 2025-09-04 ENCOUNTER — TELEPHONE (OUTPATIENT)
Dept: CARDIOLOGY | Facility: CLINIC | Age: 70
End: 2025-09-04
Payer: MEDICARE

## 2025-09-04 DIAGNOSIS — E78.5 HYPERLIPIDEMIA: ICD-10-CM

## 2025-09-04 RX ORDER — ATORVASTATIN CALCIUM 80 MG/1
80 TABLET, FILM COATED ORAL DAILY
Qty: 90 TABLET | Refills: 0 | Status: SHIPPED | OUTPATIENT
Start: 2025-09-04

## (undated) DEVICE — GOWN IMPERVIOUS SPECIALTY XLG/XLONG 32474

## (undated) DEVICE — ESU HOLSTER PLASTIC DISP E2400

## (undated) DEVICE — SU CHROMIC 4-0 RB-1 27" U203H

## (undated) DEVICE — PREP SKIN SCRUB TRAY 4461A

## (undated) DEVICE — GLOVE PROTEXIS BLUE W/NEU-THERA 7.5  2D73EB75

## (undated) DEVICE — SOL WATER IRRIG 1000ML BOTTLE 07139-09

## (undated) DEVICE — DRAPE STERI TOWEL SM 1000

## (undated) DEVICE — DECANTER VIAL 2006S

## (undated) DEVICE — GLOVE PROTEXIS W/NEU-THERA 7.0  2D73TE70

## (undated) DEVICE — BNDG COBAN 2"X5YDS UNSTERILE 2082

## (undated) DEVICE — PACK HAND

## (undated) DEVICE — BNDG KLING 2" 2231

## (undated) DEVICE — PREP CHLORHEXIDINE 4% 4OZ (HIBICLENS) 57504

## (undated) DEVICE — BLADE KNIFE BEAVER 376700

## (undated) DEVICE — DRSG ADAPTIC 3X3" 6112

## (undated) DEVICE — DRSG GAUZE 4X4" 3033

## (undated) DEVICE — ESU CORD BIPOLAR 12' E0509

## (undated) DEVICE — DRAPE SHEET REV FOLD 3/4 9349

## (undated) DEVICE — SU CHROMIC 6-0 G-1 18" 790G